# Patient Record
Sex: MALE | Race: WHITE | NOT HISPANIC OR LATINO | Employment: FULL TIME | URBAN - NONMETROPOLITAN AREA
[De-identification: names, ages, dates, MRNs, and addresses within clinical notes are randomized per-mention and may not be internally consistent; named-entity substitution may affect disease eponyms.]

---

## 2019-02-18 ENCOUNTER — OFFICE VISIT (OUTPATIENT)
Dept: FAMILY MEDICINE CLINIC | Facility: CLINIC | Age: 39
End: 2019-02-18
Payer: COMMERCIAL

## 2019-02-18 VITALS
HEIGHT: 66 IN | WEIGHT: 258.4 LBS | DIASTOLIC BLOOD PRESSURE: 94 MMHG | SYSTOLIC BLOOD PRESSURE: 168 MMHG | BODY MASS INDEX: 41.53 KG/M2

## 2019-02-18 DIAGNOSIS — Z13.220 SCREENING FOR HYPERLIPIDEMIA: ICD-10-CM

## 2019-02-18 DIAGNOSIS — E66.01 MORBID OBESITY (HCC): ICD-10-CM

## 2019-02-18 DIAGNOSIS — I10 ESSENTIAL HYPERTENSION: Primary | ICD-10-CM

## 2019-02-18 PROBLEM — F43.10 PTSD (POST-TRAUMATIC STRESS DISORDER): Status: ACTIVE | Noted: 2019-02-18

## 2019-02-18 PROCEDURE — 3008F BODY MASS INDEX DOCD: CPT | Performed by: PHYSICIAN ASSISTANT

## 2019-02-18 PROCEDURE — 1036F TOBACCO NON-USER: CPT | Performed by: PHYSICIAN ASSISTANT

## 2019-02-18 PROCEDURE — 99203 OFFICE O/P NEW LOW 30 MIN: CPT | Performed by: PHYSICIAN ASSISTANT

## 2019-02-18 RX ORDER — PAROXETINE HYDROCHLORIDE 37.5 MG/1
37.5 TABLET, FILM COATED, EXTENDED RELEASE ORAL EVERY EVENING
COMMUNITY
End: 2020-01-02 | Stop reason: CLARIF

## 2019-02-18 RX ORDER — DORZOLAMIDE HCL 20 MG/ML
1 SOLUTION/ DROPS OPHTHALMIC 3 TIMES DAILY
COMMUNITY

## 2019-02-18 RX ORDER — LISINOPRIL 20 MG/1
20 TABLET ORAL DAILY
Qty: 30 TABLET | Refills: 0 | Status: SHIPPED | OUTPATIENT
Start: 2019-02-18 | End: 2019-03-18 | Stop reason: SDUPTHER

## 2019-02-18 RX ORDER — PAROXETINE HYDROCHLORIDE 25 MG/1
25 TABLET, FILM COATED, EXTENDED RELEASE ORAL EVERY MORNING
COMMUNITY
End: 2020-01-02 | Stop reason: CLARIF

## 2019-02-18 RX ORDER — BRIMONIDINE TARTRATE 0.15 %
1 DROPS OPHTHALMIC (EYE) 3 TIMES DAILY
COMMUNITY

## 2019-02-18 RX ORDER — PREDNISOLONE ACETATE 10 MG/ML
1 SUSPENSION/ DROPS OPHTHALMIC 4 TIMES DAILY
COMMUNITY
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
## 2019-03-18 DIAGNOSIS — I10 ESSENTIAL HYPERTENSION: ICD-10-CM

## 2019-03-18 RX ORDER — LISINOPRIL 20 MG/1
TABLET ORAL
Qty: 30 TABLET | Refills: 2 | Status: SHIPPED | OUTPATIENT
Start: 2019-03-18 | End: 2019-06-26 | Stop reason: SDUPTHER

## 2019-03-19 DIAGNOSIS — M10.9 GOUT, UNSPECIFIED CAUSE, UNSPECIFIED CHRONICITY, UNSPECIFIED SITE: Primary | ICD-10-CM

## 2019-03-19 RX ORDER — COLCHICINE 0.6 MG/1
0.6 TABLET ORAL DAILY
Qty: 30 TABLET | Refills: 0 | Status: SHIPPED | OUTPATIENT
Start: 2019-03-19 | End: 2019-10-22 | Stop reason: SDUPTHER

## 2019-03-19 NOTE — TELEPHONE ENCOUNTER
His old family dr  Would prescribe it for him when he gets a gout flare up every now and then  He said it was colchicine   6 mg   Please send to [facility]

## 2019-03-19 NOTE — TELEPHONE ENCOUNTER
Pt called  requesting a prescription for Colchicine   6 mg   Nothing is recorded in patient's chart stating he has ever taken this or that we have ever prescribed  Do you want him to come in for an appt?

## 2019-04-29 ENCOUNTER — APPOINTMENT (OUTPATIENT)
Dept: RADIOLOGY | Facility: CLINIC | Age: 39
End: 2019-04-29
Payer: COMMERCIAL

## 2019-04-29 ENCOUNTER — OFFICE VISIT (OUTPATIENT)
Dept: URGENT CARE | Facility: CLINIC | Age: 39
End: 2019-04-29
Payer: COMMERCIAL

## 2019-04-29 VITALS
OXYGEN SATURATION: 97 % | WEIGHT: 242 LBS | BODY MASS INDEX: 37.98 KG/M2 | TEMPERATURE: 98.5 F | SYSTOLIC BLOOD PRESSURE: 150 MMHG | HEIGHT: 67 IN | DIASTOLIC BLOOD PRESSURE: 90 MMHG | HEART RATE: 82 BPM | RESPIRATION RATE: 18 BRPM

## 2019-04-29 DIAGNOSIS — M25.561 ACUTE PAIN OF RIGHT KNEE: Primary | ICD-10-CM

## 2019-04-29 DIAGNOSIS — M25.561 ACUTE PAIN OF RIGHT KNEE: ICD-10-CM

## 2019-04-29 PROCEDURE — 73564 X-RAY EXAM KNEE 4 OR MORE: CPT

## 2019-04-29 PROCEDURE — 99213 OFFICE O/P EST LOW 20 MIN: CPT | Performed by: PHYSICIAN ASSISTANT

## 2019-04-29 RX ORDER — NAPROXEN 500 MG/1
500 TABLET ORAL 2 TIMES DAILY WITH MEALS
Qty: 14 TABLET | Refills: 0 | Status: SHIPPED | OUTPATIENT
Start: 2019-04-29 | End: 2020-07-07

## 2019-05-01 VITALS
HEART RATE: 86 BPM | BODY MASS INDEX: 39.24 KG/M2 | HEIGHT: 67 IN | DIASTOLIC BLOOD PRESSURE: 100 MMHG | SYSTOLIC BLOOD PRESSURE: 158 MMHG | WEIGHT: 250 LBS

## 2019-05-01 DIAGNOSIS — M25.561 ACUTE PAIN OF RIGHT KNEE: ICD-10-CM

## 2019-05-01 DIAGNOSIS — Q74.1 BIPARTITE PATELLA: Primary | ICD-10-CM

## 2019-05-01 PROCEDURE — 99203 OFFICE O/P NEW LOW 30 MIN: CPT | Performed by: ORTHOPAEDIC SURGERY

## 2019-05-03 ENCOUNTER — APPOINTMENT (OUTPATIENT)
Dept: LAB | Facility: CLINIC | Age: 39
End: 2019-05-03
Payer: COMMERCIAL

## 2019-05-03 DIAGNOSIS — Z13.220 SCREENING FOR HYPERLIPIDEMIA: ICD-10-CM

## 2019-05-03 DIAGNOSIS — I10 ESSENTIAL HYPERTENSION: ICD-10-CM

## 2019-05-03 LAB
ALBUMIN SERPL BCP-MCNC: 4.4 G/DL (ref 3.5–5)
ALP SERPL-CCNC: 85 U/L (ref 46–116)
ALT SERPL W P-5'-P-CCNC: 43 U/L (ref 12–78)
ANION GAP SERPL CALCULATED.3IONS-SCNC: 6 MMOL/L (ref 4–13)
AST SERPL W P-5'-P-CCNC: 20 U/L (ref 5–45)
BASOPHILS # BLD AUTO: 0.07 THOUSANDS/ΜL (ref 0–0.1)
BASOPHILS NFR BLD AUTO: 1 % (ref 0–1)
BILIRUB SERPL-MCNC: 0.66 MG/DL (ref 0.2–1)
BUN SERPL-MCNC: 14 MG/DL (ref 5–25)
CALCIUM SERPL-MCNC: 9.2 MG/DL (ref 8.3–10.1)
CHLORIDE SERPL-SCNC: 105 MMOL/L (ref 100–108)
CHOLEST SERPL-MCNC: 192 MG/DL (ref 50–200)
CO2 SERPL-SCNC: 29 MMOL/L (ref 21–32)
CREAT SERPL-MCNC: 1.11 MG/DL (ref 0.6–1.3)
EOSINOPHIL # BLD AUTO: 0.13 THOUSAND/ΜL (ref 0–0.61)
EOSINOPHIL NFR BLD AUTO: 1 % (ref 0–6)
ERYTHROCYTE [DISTWIDTH] IN BLOOD BY AUTOMATED COUNT: 12.7 % (ref 11.6–15.1)
GFR SERPL CREATININE-BSD FRML MDRD: 84 ML/MIN/1.73SQ M
GLUCOSE P FAST SERPL-MCNC: 87 MG/DL (ref 65–99)
HCT VFR BLD AUTO: 44.9 % (ref 36.5–49.3)
HDLC SERPL-MCNC: 33 MG/DL (ref 40–60)
HGB BLD-MCNC: 15.3 G/DL (ref 12–17)
IMM GRANULOCYTES # BLD AUTO: 0.05 THOUSAND/UL (ref 0–0.2)
IMM GRANULOCYTES NFR BLD AUTO: 1 % (ref 0–2)
LDLC SERPL CALC-MCNC: 93 MG/DL (ref 0–100)
LYMPHOCYTES # BLD AUTO: 2.67 THOUSANDS/ΜL (ref 0.6–4.47)
LYMPHOCYTES NFR BLD AUTO: 26 % (ref 14–44)
MCH RBC QN AUTO: 29.7 PG (ref 26.8–34.3)
MCHC RBC AUTO-ENTMCNC: 34.1 G/DL (ref 31.4–37.4)
MCV RBC AUTO: 87 FL (ref 82–98)
MONOCYTES # BLD AUTO: 0.73 THOUSAND/ΜL (ref 0.17–1.22)
MONOCYTES NFR BLD AUTO: 7 % (ref 4–12)
NEUTROPHILS # BLD AUTO: 6.58 THOUSANDS/ΜL (ref 1.85–7.62)
NEUTS SEG NFR BLD AUTO: 64 % (ref 43–75)
NONHDLC SERPL-MCNC: 159 MG/DL
NRBC BLD AUTO-RTO: 0 /100 WBCS
PLATELET # BLD AUTO: 207 THOUSANDS/UL (ref 149–390)
PMV BLD AUTO: 11 FL (ref 8.9–12.7)
POTASSIUM SERPL-SCNC: 4.2 MMOL/L (ref 3.5–5.3)
PROT SERPL-MCNC: 7.6 G/DL (ref 6.4–8.2)
RBC # BLD AUTO: 5.15 MILLION/UL (ref 3.88–5.62)
SODIUM SERPL-SCNC: 140 MMOL/L (ref 136–145)
TRIGL SERPL-MCNC: 331 MG/DL
TSH SERPL DL<=0.05 MIU/L-ACNC: 3.33 UIU/ML (ref 0.36–3.74)
WBC # BLD AUTO: 10.23 THOUSAND/UL (ref 4.31–10.16)

## 2019-05-03 PROCEDURE — 85025 COMPLETE CBC W/AUTO DIFF WBC: CPT

## 2019-05-03 PROCEDURE — 36415 COLL VENOUS BLD VENIPUNCTURE: CPT

## 2019-05-03 PROCEDURE — 84443 ASSAY THYROID STIM HORMONE: CPT

## 2019-05-03 PROCEDURE — 80053 COMPREHEN METABOLIC PANEL: CPT

## 2019-05-03 PROCEDURE — 80061 LIPID PANEL: CPT

## 2019-05-06 ENCOUNTER — TELEPHONE (OUTPATIENT)
Dept: FAMILY MEDICINE CLINIC | Facility: CLINIC | Age: 39
End: 2019-05-06

## 2019-05-06 DIAGNOSIS — I10 ESSENTIAL HYPERTENSION: Primary | ICD-10-CM

## 2019-05-06 RX ORDER — HYDROCHLOROTHIAZIDE 25 MG/1
25 TABLET ORAL DAILY
Qty: 30 TABLET | Refills: 0 | Status: SHIPPED | OUTPATIENT
Start: 2019-05-06 | End: 2019-06-03 | Stop reason: SDUPTHER

## 2019-05-09 ENCOUNTER — EVALUATION (OUTPATIENT)
Dept: PHYSICAL THERAPY | Facility: CLINIC | Age: 39
End: 2019-05-09
Payer: COMMERCIAL

## 2019-05-09 DIAGNOSIS — Q74.1 BIPARTITE PATELLA: ICD-10-CM

## 2019-05-09 DIAGNOSIS — M25.561 ACUTE PAIN OF RIGHT KNEE: Primary | ICD-10-CM

## 2019-05-09 PROCEDURE — 97110 THERAPEUTIC EXERCISES: CPT | Performed by: PHYSICAL THERAPIST

## 2019-05-09 PROCEDURE — 97535 SELF CARE MNGMENT TRAINING: CPT | Performed by: PHYSICAL THERAPIST

## 2019-05-09 PROCEDURE — 97162 PT EVAL MOD COMPLEX 30 MIN: CPT | Performed by: PHYSICAL THERAPIST

## 2019-05-13 ENCOUNTER — APPOINTMENT (OUTPATIENT)
Dept: PHYSICAL THERAPY | Facility: CLINIC | Age: 39
End: 2019-05-13
Payer: COMMERCIAL

## 2019-05-14 VITALS
HEIGHT: 67 IN | BODY MASS INDEX: 38.61 KG/M2 | DIASTOLIC BLOOD PRESSURE: 89 MMHG | HEART RATE: 84 BPM | WEIGHT: 246 LBS | SYSTOLIC BLOOD PRESSURE: 156 MMHG

## 2019-05-14 DIAGNOSIS — Q74.1 BIPARTITE PATELLA: Primary | ICD-10-CM

## 2019-05-14 PROCEDURE — 20610 DRAIN/INJ JOINT/BURSA W/O US: CPT | Performed by: ORTHOPAEDIC SURGERY

## 2019-05-14 PROCEDURE — 99213 OFFICE O/P EST LOW 20 MIN: CPT | Performed by: ORTHOPAEDIC SURGERY

## 2019-05-14 RX ORDER — IBUPROFEN 800 MG/1
800 TABLET ORAL EVERY 8 HOURS PRN
Qty: 15 TABLET | Refills: 0 | Status: SHIPPED | OUTPATIENT
Start: 2019-05-14 | End: 2020-07-07

## 2019-05-14 RX ORDER — LIDOCAINE HYDROCHLORIDE 10 MG/ML
3 INJECTION, SOLUTION INFILTRATION; PERINEURAL
Status: COMPLETED | OUTPATIENT
Start: 2019-05-14 | End: 2019-05-14

## 2019-05-14 RX ORDER — TRIAMCINOLONE ACETONIDE 40 MG/ML
80 INJECTION, SUSPENSION INTRA-ARTICULAR; INTRAMUSCULAR
Status: COMPLETED | OUTPATIENT
Start: 2019-05-14 | End: 2019-05-14

## 2019-05-14 RX ADMIN — TRIAMCINOLONE ACETONIDE 80 MG: 40 INJECTION, SUSPENSION INTRA-ARTICULAR; INTRAMUSCULAR at 13:14

## 2019-05-14 RX ADMIN — LIDOCAINE HYDROCHLORIDE 3 ML: 10 INJECTION, SOLUTION INFILTRATION; PERINEURAL at 13:14

## 2019-05-16 ENCOUNTER — APPOINTMENT (OUTPATIENT)
Dept: PHYSICAL THERAPY | Facility: CLINIC | Age: 39
End: 2019-05-16
Payer: COMMERCIAL

## 2019-05-21 ENCOUNTER — HOSPITAL ENCOUNTER (OUTPATIENT)
Dept: CT IMAGING | Facility: HOSPITAL | Age: 39
Discharge: HOME | End: 2019-05-21
Admitting: RADIOLOGY
Payer: COMMERCIAL

## 2019-05-21 ENCOUNTER — HOSPITAL ENCOUNTER (OUTPATIENT)
Dept: RADIOLOGY | Facility: HOSPITAL | Age: 39
Discharge: HOME | End: 2019-05-21
Payer: COMMERCIAL

## 2019-05-21 ENCOUNTER — TELEPHONE (OUTPATIENT)
Dept: OBGYN CLINIC | Facility: CLINIC | Age: 39
End: 2019-05-21

## 2019-05-21 DIAGNOSIS — M25.561 ACUTE PAIN OF RIGHT KNEE: ICD-10-CM

## 2019-05-21 PROCEDURE — 73701 CT LOWER EXTREMITY W/DYE: CPT

## 2019-05-21 PROCEDURE — 77002 NEEDLE LOCALIZATION BY XRAY: CPT

## 2019-05-21 PROCEDURE — 27369 NJX CNTRST KNE ARTHG/CT/MRI: CPT

## 2019-05-21 RX ORDER — BUPIVACAINE HYDROCHLORIDE 2.5 MG/ML
5 INJECTION, SOLUTION EPIDURAL; INFILTRATION; INTRACAUDAL
Status: COMPLETED | OUTPATIENT
Start: 2019-05-21 | End: 2019-05-21

## 2019-05-21 RX ORDER — LIDOCAINE WITH 8.4% SOD BICARB 0.9%(10ML)
3 SYRINGE (ML) INJECTION ONCE
Status: COMPLETED | OUTPATIENT
Start: 2019-05-21 | End: 2019-05-21

## 2019-05-21 RX ORDER — 0.9 % SODIUM CHLORIDE 0.9 %
30 VIAL (ML) INJECTION
Status: COMPLETED | OUTPATIENT
Start: 2019-05-21 | End: 2019-05-21

## 2019-05-21 RX ADMIN — BUPIVACAINE HYDROCHLORIDE 5 ML: 2.5 INJECTION, SOLUTION EPIDURAL; INFILTRATION; INTRACAUDAL; PERINEURAL at 14:07

## 2019-05-21 RX ADMIN — IOHEXOL 30 ML: 240 INJECTION, SOLUTION INTRATHECAL; INTRAVASCULAR; INTRAVENOUS; ORAL at 14:07

## 2019-05-21 RX ADMIN — SODIUM CHLORIDE 30 ML: 9 INJECTION, SOLUTION INTRAMUSCULAR; INTRAVENOUS; SUBCUTANEOUS at 14:07

## 2019-05-21 RX ADMIN — LIDOCAINE HYDROCHLORIDE 3 ML: 10 INJECTION, SOLUTION INFILTRATION; PERINEURAL at 14:07

## 2019-05-24 VITALS
SYSTOLIC BLOOD PRESSURE: 156 MMHG | BODY MASS INDEX: 37.83 KG/M2 | WEIGHT: 241 LBS | DIASTOLIC BLOOD PRESSURE: 92 MMHG | HEIGHT: 67 IN | HEART RATE: 90 BPM

## 2019-05-24 DIAGNOSIS — M22.2X1 PATELLOFEMORAL DISORDER OF RIGHT KNEE: Primary | ICD-10-CM

## 2019-05-24 PROCEDURE — 99213 OFFICE O/P EST LOW 20 MIN: CPT | Performed by: ORTHOPAEDIC SURGERY

## 2019-06-03 DIAGNOSIS — I10 ESSENTIAL HYPERTENSION: ICD-10-CM

## 2019-06-03 RX ORDER — HYDROCHLOROTHIAZIDE 25 MG/1
TABLET ORAL
Qty: 30 TABLET | Refills: 0 | Status: SHIPPED | OUTPATIENT
Start: 2019-06-03 | End: 2019-08-08 | Stop reason: ALTCHOICE

## 2019-06-26 DIAGNOSIS — I10 ESSENTIAL HYPERTENSION: ICD-10-CM

## 2019-06-26 RX ORDER — LISINOPRIL 20 MG/1
20 TABLET ORAL DAILY
Qty: 30 TABLET | Refills: 2 | Status: SHIPPED | OUTPATIENT
Start: 2019-06-26 | End: 2019-09-18 | Stop reason: SDUPTHER

## 2019-07-15 ENCOUNTER — TELEPHONE (OUTPATIENT)
Dept: FAMILY MEDICINE CLINIC | Facility: CLINIC | Age: 39
End: 2019-07-15

## 2019-07-15 NOTE — TELEPHONE ENCOUNTER
PT CAME IN FOR BP CHECK /88  HE SAID HE HAS SOME DIZZINESS SINCE THE WATER PILL WAS ADDED  HE IS ALSO CUTTING BACK ON DRINKS WITH CAFFEINE, I ASKED IF THE DIZZINESS IN ON THE DAYS HE IS NOT DRINKING CAFFEINE, HE SAID IT IS  THE DAYS HE HAS CAFFEINE HE IS NOT DIZZY  I TOLD HIM TO CUT BACK BUT NOT GIVE UP COLD TURKEY  WEAN OFF AND SEE HOW HE FEELS  WHAT DO YOU THINK?

## 2019-07-15 NOTE — TELEPHONE ENCOUNTER
It may be related to cutting out the caffeine intake  His pressure may be lower on the days he is not drinking caffeine  I agree, he should try to wean off of it rather than quitting cold turkey  I would like him to keep a log of his blood pressures

## 2019-07-15 NOTE — TELEPHONE ENCOUNTER
----- Message from Destinee Meneses sent at 7/15/2019 11:23 AM EDT -----  Regarding: FW: Your Recent Visit  Contact: [phone]      ----- Message -----  From: Geovanny Ordonez  Sent: 7/15/2019   9:56 AM EDT  To: [facility]  Subject: RE: Your Recent Visit                            Good morning Tracey Johnny  My blood pressure medicines, is it normal to feel light headed/dizzy a little bit? I been taking the (2) blood pressure meds and my 37 5mg Paxil Cr together in the morning  Is this ok and to be expected? Just asking Incase it's not and maybe I should be seen  Not sure if taking all 3 together is the issue  Thank you   ----- Message -----  From: Desirae Muñoz PA-C  Sent: 2/18/19, 4:19 PM  To: Geovanny Ordonez  Subject: Your Recent Visit    Geovanny Ordonez, you have a new After Visit Summary in [address] [place] [address]  Please click on visits and then your most recent appointment, to view your After Visit Summary  If you are experiencing any technical issues please call our patient service desk at [phone] ([phone]) option [phone]

## 2019-07-15 NOTE — TELEPHONE ENCOUNTER
I would recommend that he keep a log of his blood pressures and come in for an appointment to discuss

## 2019-08-08 ENCOUNTER — TELEPHONE (OUTPATIENT)
Dept: FAMILY MEDICINE CLINIC | Facility: CLINIC | Age: 39
End: 2019-08-08

## 2019-08-08 DIAGNOSIS — I10 ESSENTIAL HYPERTENSION: Primary | ICD-10-CM

## 2019-08-08 RX ORDER — AMLODIPINE BESYLATE 5 MG/1
5 TABLET ORAL DAILY
Qty: 30 TABLET | Refills: 0 | Status: SHIPPED | OUTPATIENT
Start: 2019-08-08 | End: 2019-09-05 | Stop reason: SDUPTHER

## 2019-08-08 NOTE — TELEPHONE ENCOUNTER
Discontinue HCTZ and I will send a script for amlodipine  Come in for a BP check in about 1 week  Continue to check home blood pressures  Let us know if symptoms do not improve  Also, Paxil can sometimes cause ED   He may want to also talk to his psychiatrist

## 2019-08-08 NOTE — TELEPHONE ENCOUNTER
Pt called stating since you added the HCTZ medication his bp is good and he is losing weight but does c/o erectile dysfunction  Asking if you can switch something to help resolve this problem?

## 2019-08-30 DIAGNOSIS — I10 ESSENTIAL HYPERTENSION: ICD-10-CM

## 2019-08-30 RX ORDER — HYDROCHLOROTHIAZIDE 25 MG/1
TABLET ORAL
Qty: 30 TABLET | Refills: 0 | OUTPATIENT
Start: 2019-08-30

## 2019-09-03 DIAGNOSIS — I10 ESSENTIAL HYPERTENSION: ICD-10-CM

## 2019-09-03 RX ORDER — HYDROCHLOROTHIAZIDE 25 MG/1
TABLET ORAL
Qty: 30 TABLET | Refills: 0 | OUTPATIENT
Start: 2019-09-03

## 2019-09-05 DIAGNOSIS — I10 ESSENTIAL HYPERTENSION: ICD-10-CM

## 2019-09-05 RX ORDER — AMLODIPINE BESYLATE 5 MG/1
TABLET ORAL
Qty: 30 TABLET | Refills: 0 | Status: SHIPPED | OUTPATIENT
Start: 2019-09-05 | End: 2019-09-29 | Stop reason: SDUPTHER

## 2019-09-18 DIAGNOSIS — I10 ESSENTIAL HYPERTENSION: ICD-10-CM

## 2019-09-18 RX ORDER — LISINOPRIL 20 MG/1
TABLET ORAL
Qty: 90 TABLET | Refills: 0 | Status: SHIPPED | OUTPATIENT
Start: 2019-09-18 | End: 2019-12-12 | Stop reason: SDUPTHER

## 2019-09-18 NOTE — TELEPHONE ENCOUNTER
Please let Flora Krishna know that I refilled his bp medication, but he should come in for an appointment before his next refill to have a recheck

## 2019-09-29 DIAGNOSIS — I10 ESSENTIAL HYPERTENSION: ICD-10-CM

## 2019-09-30 DIAGNOSIS — I10 ESSENTIAL HYPERTENSION: ICD-10-CM

## 2019-09-30 RX ORDER — AMLODIPINE BESYLATE 5 MG/1
5 TABLET ORAL DAILY
Qty: 90 TABLET | Refills: 0 | Status: SHIPPED | OUTPATIENT
Start: 2019-09-30 | End: 2019-09-30 | Stop reason: SDUPTHER

## 2019-09-30 RX ORDER — AMLODIPINE BESYLATE 5 MG/1
5 TABLET ORAL DAILY
Qty: 90 TABLET | Refills: 0 | Status: SHIPPED | OUTPATIENT
Start: 2019-09-30 | End: 2020-01-02 | Stop reason: SDUPTHER

## 2019-09-30 RX ORDER — AMLODIPINE BESYLATE 5 MG/1
TABLET ORAL
Qty: 90 TABLET | Refills: 0 | Status: SHIPPED | OUTPATIENT
Start: 2019-09-30 | End: 2019-09-30 | Stop reason: SDUPTHER

## 2019-10-09 DIAGNOSIS — I10 ESSENTIAL HYPERTENSION: ICD-10-CM

## 2019-10-09 RX ORDER — HYDROCHLOROTHIAZIDE 25 MG/1
TABLET ORAL
Qty: 30 TABLET | Refills: 0 | OUTPATIENT
Start: 2019-10-09

## 2019-10-16 ENCOUNTER — OFFICE VISIT (OUTPATIENT)
Dept: FAMILY MEDICINE CLINIC | Facility: CLINIC | Age: 39
End: 2019-10-16
Payer: COMMERCIAL

## 2019-10-16 VITALS
WEIGHT: 251.8 LBS | HEIGHT: 67 IN | DIASTOLIC BLOOD PRESSURE: 88 MMHG | SYSTOLIC BLOOD PRESSURE: 140 MMHG | BODY MASS INDEX: 39.52 KG/M2

## 2019-10-16 DIAGNOSIS — I10 ESSENTIAL HYPERTENSION: Primary | ICD-10-CM

## 2019-10-16 DIAGNOSIS — F41.9 ANXIETY: ICD-10-CM

## 2019-10-16 PROBLEM — F43.10 PTSD (POST-TRAUMATIC STRESS DISORDER): Status: ACTIVE | Noted: 2018-10-08

## 2019-10-16 PROBLEM — F33.41 RECURRENT MAJOR DEPRESSIVE DISORDER, IN PARTIAL REMISSION (HCC): Status: ACTIVE | Noted: 2017-10-11

## 2019-10-16 PROCEDURE — 3008F BODY MASS INDEX DOCD: CPT | Performed by: PHYSICIAN ASSISTANT

## 2019-10-16 PROCEDURE — 99213 OFFICE O/P EST LOW 20 MIN: CPT | Performed by: PHYSICIAN ASSISTANT

## 2019-10-16 NOTE — LETTER
October 16, 2019     Patient: Lex Lackey   YOB: 1980   Date of Visit: 10/16/2019       To Whom it May Concern:    Lex Lackey is under my professional care  He was seen in my office on 10/16/2019  He is excused from work from 10/14/19 to 10/22/19  He may return to work on 10/23/19  If you have any questions or concerns, please don't hesitate to call           Sincerely,          James Rosen PA-C        CC: No Recipients

## 2019-10-16 NOTE — LETTER
October 16, 2019     Patient: Natalio Rivas   YOB: 1980   Date of Visit: 10/16/2019       To Whom it May Concern:    Natalio Rivas is under my professional care  He was seen in my office on 10/16/2019  He may return to work on 10/23/19  If you have any questions or concerns, please don't hesitate to call           Sincerely,            Gema Godinez PA-C

## 2019-10-16 NOTE — PROGRESS NOTES
Assessment/Plan:    Problem List Items Addressed This Visit        Cardiovascular and Mediastinum    Essential hypertension - Primary       Other    Anxiety           Diagnoses and all orders for this visit:    Essential hypertension    Anxiety        Continue same medications for hypertension  Recommended that he consider getting established with a new counselor and following up with his psychiatrist    Agreed to give him off until next Wednesday to allow him to regroup and take some time for himself  Subjective:      Patient ID: Yesica Wills is a 46 y o  male  Troy Snyder is a very pleasant 46year old male who is here for a follow up for his blood pressure and has been dealing with an increased amount of anxiety  He was having episodes of lightheadedness when he was on the HCTZ, so we switched him to amlodipine  He has not had anymore issues since his medication has rita changed  He does not check his blood pressures on a regular basis  He admits that his anxiety has been very high  He is still following with his psychiatrist, but he feels like he does not want to listen to him  He is on Paxil, which does provide some relief  He was seeing a counselor, but did not have a good connection with them  He is interested in finding a new counselor  His son is a senior in high school, and has been making poor decisions  He is constantly worrying about him and disciplining him  His job is also adding a lot of stress  He works either 6-7 days/week  He has not had a holiday off in a long time  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 and feels like they do not care about their work, which filters down to him  He did apply for a new job, and is hopeful about getting it  He comes home and just wants to sleep because the anxiety eats away at him  He spoke with his supervisor about taking some time off so that he can get his anxiety under control, and they were agreeable as long as he had documentation to support it  The following portions of the patient's history were reviewed and updated as appropriate:   He has a past medical history of Blindness of left eye and Knee pain, right ,  does not have any pertinent problems on file  ,   has a past surgical history that includes Eye surgery; Neck surgery; Wrist surgery; Hand surgery; and FL injection right knee (arthrogram) (5/21/2019)  ,  family history includes Anxiety disorder in his mother; Depression in his mother; Diabetes in his mother; Hypertension in his mother  ,   reports that he has never smoked  He has never used smokeless tobacco  He reports that he does not drink alcohol or use drugs  ,  is allergic to amoxil [amoxicillin]; ciprofloxacin; and penicillins     Current Outpatient Medications   Medication Sig Dispense Refill    amLODIPine (NORVASC) 5 mg tablet Take 1 tablet (5 mg total) by mouth daily 90 tablet 0    brimonidine (ALPHAGAN P) 0 15 % ophthalmic solution 1 drop 3 (three) times a day      colchicine (COLCRYS) 0 6 mg tablet Take 1 tablet (0 6 mg total) by mouth daily 30 tablet 0    dorzolamide (TRUSOPT) 2 % ophthalmic solution 1 drop 3 (three) times a day      ibuprofen (MOTRIN) 800 mg tablet Take 1 tablet (800 mg total) by mouth every 8 (eight) hours as needed for mild pain for up to 5 days 15 tablet 0    lisinopril (ZESTRIL) 20 mg tablet TAKE 1 TABLET BY MOUTH EVERY DAY 90 tablet 0    naproxen (NAPROSYN) 500 mg tablet Take 1 tablet (500 mg total) by mouth 2 (two) times a day with meals for 7 days 14 tablet 0    PARoxetine (PAXIL-CR) 25 mg 24 hr tablet Take 25 mg by mouth every morning      PARoxetine (PAXIL-CR) 37 5 mg 24 hr tablet Take 37 5 mg by mouth every evening      prednisoLONE acetate (PRED FORTE) 1 % ophthalmic suspension 1 drop 4 (four) times a day       No current facility-administered medications for this visit  Review of Systems   Constitutional: Negative for chills, diaphoresis, fatigue and fever     HENT: Negative for congestion, ear pain, postnasal drip, rhinorrhea, sneezing, sore throat and trouble swallowing  Eyes: Positive for visual disturbance (blind in left eye)  Negative for pain  Respiratory: Negative for apnea, cough, shortness of breath and wheezing  Cardiovascular: Negative for chest pain and palpitations  Gastrointestinal: Negative for abdominal pain, constipation, diarrhea, nausea and vomiting  Genitourinary: Negative for dysuria and hematuria  Musculoskeletal: Negative for arthralgias, gait problem and myalgias  Neurological: Negative for dizziness, syncope, weakness, light-headedness, numbness and headaches  Psychiatric/Behavioral: Positive for sleep disturbance  Negative for suicidal ideas  The patient is nervous/anxious  Objective:  Vitals:    10/16/19 1454   BP: 140/88   BP Location: Left arm   Patient Position: Sitting   Weight: 114 kg (251 lb 12 8 oz)   Height: 5' 7" (1 702 m)     Body mass index is 39 44 kg/m²  Physical Exam   Constitutional: He is oriented to person, place, and time  He appears well-developed and well-nourished  HENT:   Head: Normocephalic and atraumatic  Right Ear: External ear normal    Left Ear: External ear normal    Nose: Nose normal    Eyes: Pupils are equal, round, and reactive to light  EOM are normal    Neck: Normal range of motion  Neck supple  Cardiovascular: Normal rate, regular rhythm and normal heart sounds  Exam reveals no gallop and no friction rub  No murmur heard  Pulmonary/Chest: Effort normal and breath sounds normal  No respiratory distress  He has no wheezes  He has no rales  Musculoskeletal: Normal range of motion  Lymphadenopathy:     He has no cervical adenopathy  Neurological: He is alert and oriented to person, place, and time  Skin: Skin is warm and dry  Psychiatric: His behavior is normal  Judgment and thought content normal  His mood appears anxious

## 2019-10-21 ENCOUNTER — TELEPHONE (OUTPATIENT)
Dept: FAMILY MEDICINE CLINIC | Facility: CLINIC | Age: 39
End: 2019-10-21

## 2019-10-21 NOTE — LETTER
October 22, 2019     Patient: Gabriela Quinn   YOB: 1980   Date of Visit: 10/21/2019       To Whom It May Concern: It is my medical opinion that Gabriela Qiunn may return to work on 10/28/2019  Please excuse him from work 10/16/19-10/28/19  If you have any questions or concerns, please don't hesitate to call           Sincerely,        Thomas Self PA-C    CC: No Recipients

## 2019-10-21 NOTE — TELEPHONE ENCOUNTER
Pt seen last week w/ personal issues  He is supposed to return to work this Wed  Asking if you can extend it until Monday?

## 2019-10-21 NOTE — TELEPHONE ENCOUNTER
I already submitted his paperwork to return  I have no problem extending it, however, he may have to follow through on his end to have the paperwork faxed back to us

## 2019-10-21 NOTE — LETTER
October 22, 2019     Patient: Chinita Romberg   YOB: 1980   Date of Visit: 10/21/2019       To Whom It May Concern: It is my medical opinion that Chinita Romberg may return to work on 10/28/19  Please excuse patient from work 10/16/19 to 10/27/19  If you have any questions or concerns, please don't hesitate to call           Sincerely,        Corbin Brooks PA-C    CC: No Recipients

## 2019-10-21 NOTE — TELEPHONE ENCOUNTER
Patient called to advise he reached out to [facility] about new paperwork for time off extension  Patient was advised that he wouldn't need new paperwork, that you should just cross out the original return to work date, initial it and write in the new date and re-fax back  Patient is also requesting to  an updated work note next week

## 2019-10-22 ENCOUNTER — TELEPHONE (OUTPATIENT)
Dept: FAMILY MEDICINE CLINIC | Facility: CLINIC | Age: 39
End: 2019-10-22

## 2019-10-22 DIAGNOSIS — M10.9 GOUT, UNSPECIFIED CAUSE, UNSPECIFIED CHRONICITY, UNSPECIFIED SITE: ICD-10-CM

## 2019-10-22 RX ORDER — COLCHICINE 0.6 MG/1
0.6 TABLET ORAL DAILY
Qty: 30 TABLET | Refills: 1 | Status: SHIPPED | OUTPATIENT
Start: 2019-10-22 | End: 2019-12-24 | Stop reason: SDUPTHER

## 2019-10-22 NOTE — TELEPHONE ENCOUNTER
YOU DID A NOTE FOR PT, THE DATE WAS WRONG  THE INITIAL START DATE WAS 10-14-19  HE SAID YOU KNOW ABOUT THIS AND THANK YOU

## 2019-12-12 DIAGNOSIS — I10 ESSENTIAL HYPERTENSION: ICD-10-CM

## 2019-12-12 RX ORDER — LISINOPRIL 20 MG/1
TABLET ORAL
Qty: 90 TABLET | Refills: 0 | Status: SHIPPED | OUTPATIENT
Start: 2019-12-12 | End: 2020-03-05

## 2019-12-24 DIAGNOSIS — M10.9 GOUT, UNSPECIFIED CAUSE, UNSPECIFIED CHRONICITY, UNSPECIFIED SITE: ICD-10-CM

## 2019-12-27 RX ORDER — COLCHICINE 0.6 MG/1
TABLET ORAL
Qty: 30 TABLET | Refills: 1 | Status: SHIPPED | OUTPATIENT
Start: 2019-12-27 | End: 2020-03-05

## 2020-01-02 ENCOUNTER — OFFICE VISIT (OUTPATIENT)
Dept: FAMILY MEDICINE CLINIC | Facility: CLINIC | Age: 40
End: 2020-01-02
Payer: COMMERCIAL

## 2020-01-02 VITALS
HEART RATE: 88 BPM | HEIGHT: 67 IN | SYSTOLIC BLOOD PRESSURE: 138 MMHG | DIASTOLIC BLOOD PRESSURE: 68 MMHG | WEIGHT: 253.6 LBS | BODY MASS INDEX: 39.8 KG/M2 | OXYGEN SATURATION: 98 %

## 2020-01-02 DIAGNOSIS — F43.10 PTSD (POST-TRAUMATIC STRESS DISORDER): ICD-10-CM

## 2020-01-02 DIAGNOSIS — M1A.9XX0 CHRONIC GOUT WITHOUT TOPHUS, UNSPECIFIED CAUSE, UNSPECIFIED SITE: ICD-10-CM

## 2020-01-02 DIAGNOSIS — I10 ESSENTIAL HYPERTENSION: ICD-10-CM

## 2020-01-02 DIAGNOSIS — F41.9 ANXIETY: Primary | ICD-10-CM

## 2020-01-02 DIAGNOSIS — Z13.220 SCREENING FOR HYPERLIPIDEMIA: ICD-10-CM

## 2020-01-02 PROCEDURE — 3075F SYST BP GE 130 - 139MM HG: CPT | Performed by: PHYSICIAN ASSISTANT

## 2020-01-02 PROCEDURE — 3078F DIAST BP <80 MM HG: CPT | Performed by: PHYSICIAN ASSISTANT

## 2020-01-02 PROCEDURE — 3008F BODY MASS INDEX DOCD: CPT | Performed by: PHYSICIAN ASSISTANT

## 2020-01-02 PROCEDURE — 1036F TOBACCO NON-USER: CPT | Performed by: PHYSICIAN ASSISTANT

## 2020-01-02 PROCEDURE — 99214 OFFICE O/P EST MOD 30 MIN: CPT | Performed by: PHYSICIAN ASSISTANT

## 2020-01-02 RX ORDER — AMLODIPINE BESYLATE 5 MG/1
5 TABLET ORAL DAILY
Qty: 90 TABLET | Refills: 0 | Status: SHIPPED | OUTPATIENT
Start: 2020-01-02 | End: 2020-06-29

## 2020-01-02 RX ORDER — PAROXETINE HYDROCHLORIDE 37.5 MG/1
37.5 TABLET, FILM COATED, EXTENDED RELEASE ORAL EVERY MORNING
Qty: 30 TABLET | Refills: 2 | Status: SHIPPED | OUTPATIENT
Start: 2020-01-02 | End: 2020-04-06

## 2020-01-02 RX ORDER — PAROXETINE HCL 25 MG
25 TABLET, EXTENDED RELEASE 24 HR ORAL EVERY MORNING
Qty: 30 TABLET | Refills: 2 | Status: SHIPPED | OUTPATIENT
Start: 2020-01-02 | End: 2020-05-18

## 2020-01-02 NOTE — PROGRESS NOTES
Assessment/Plan:    Problem List Items Addressed This Visit        Cardiovascular and Mediastinum    Essential hypertension    Relevant Medications    amLODIPine (NORVASC) 5 mg tablet    Other Relevant Orders    CBC and differential    Comprehensive metabolic panel       Other    PTSD (post-traumatic stress disorder)    Relevant Medications    PAXIL CR 37 5 MG 24 hr tablet    PAXIL CR 25 MG 24 hr tablet    Anxiety - Primary    Relevant Medications    PAXIL CR 37 5 MG 24 hr tablet    PAXIL CR 25 MG 24 hr tablet    Chronic gout without tophus    Relevant Orders    CBC and differential    Comprehensive metabolic panel    Uric acid      Other Visit Diagnoses     Screening for hyperlipidemia        Relevant Orders    Comprehensive metabolic panel    Lipid panel           Diagnoses and all orders for this visit:    Anxiety  -     PAXIL CR 37 5 MG 24 hr tablet; Take 1 tablet (37 5 mg total) by mouth every morning  -     PAXIL CR 25 MG 24 hr tablet; Take 1 tablet (25 mg total) by mouth every morning    PTSD (post-traumatic stress disorder)  -     PAXIL CR 37 5 MG 24 hr tablet; Take 1 tablet (37 5 mg total) by mouth every morning  -     PAXIL CR 25 MG 24 hr tablet; Take 1 tablet (25 mg total) by mouth every morning    Chronic gout without tophus, unspecified cause, unspecified site  -     CBC and differential; Future  -     Comprehensive metabolic panel; Future  -     Uric acid; Future    Essential hypertension  -     CBC and differential; Future  -     Comprehensive metabolic panel; Future  -     amLODIPine (NORVASC) 5 mg tablet; Take 1 tablet (5 mg total) by mouth daily    Screening for hyperlipidemia  -     Comprehensive metabolic panel; Future  -     Lipid panel; Future        Agreed to prescribe Paxil, but advised him that if symptoms worsen he needs to go back to a psychiatrist  Patient verbalized understanding  Will get blood work to assess uric acid level and routine labs   Educated on avoiding trigger foods and eating a low-purine diet  Continue same medications  Follow-up in 4 months or sooner if needed  Subjective:      Patient ID: Kaylyn Patel is a 45 y o  male  Michelledelia Acuna is a very pleasant 45year old male who is here for a routine follow-up  He has been stable on Paxil for many years  He is asking if we would be willing to take over as the prescriber  He is going to see a psychiatrist every 3 months, but it is expensive and he does not get anything out of his visits  He is also asking if there is anything he can be started on to help with gout  He is getting a gout flare approximately 1-2 times per week  He states that he gets gouty attacks in his his right foot, right knee, left wrist, or right wrist  He states that his joint will get warm, red, and swollen  It usually happens after eating certain foods  He was on allopurinol in the past, but never continued with the script  He takes colchicine as needed, which does help resolve the flares  He is not currently in a flare  The following portions of the patient's history were reviewed and updated as appropriate:   He has a past medical history of Blindness of left eye and Knee pain, right ,  does not have any pertinent problems on file  ,   has a past surgical history that includes Eye surgery; Neck surgery; Wrist surgery; Hand surgery; and [place] injection right knee (arthrogram) (5/21/2019)  ,  family history includes Anxiety disorder in his mother; Depression in his mother; Diabetes in his mother; Hypertension in his mother  ,   reports that he has never smoked  He has never used smokeless tobacco  He reports that he does not drink alcohol or use drugs  ,  is allergic to amoxil [amoxicillin]; ciprofloxacin; and penicillins     Current Outpatient Medications   Medication Sig Dispense Refill    amLODIPine (NORVASC) 5 mg tablet Take 1 tablet (5 mg total) by mouth daily 90 tablet 0    brimonidine (ALPHAGAN P) 0 15 % ophthalmic solution 1 drop 3 (three) times a day  colchicine (COLCRYS) 0 6 mg tablet TAKE 1 TABLET BY MOUTH EVERY DAY 30 tablet 1    dorzolamide (TRUSOPT) 2 % ophthalmic solution 1 drop 3 (three) times a day      lisinopril (ZESTRIL) 20 mg tablet TAKE 1 TABLET BY MOUTH EVERY DAY 90 tablet 0    prednisoLONE acetate (PRED FORTE) 1 % ophthalmic suspension 1 drop 4 (four) times a day      ibuprofen (MOTRIN) 800 mg tablet Take 1 tablet (800 mg total) by mouth every 8 (eight) hours as needed for mild pain for up to 5 days 15 tablet 0    naproxen (NAPROSYN) 500 mg tablet Take 1 tablet (500 mg total) by mouth 2 (two) times a day with meals for 7 days 14 tablet 0    PAXIL CR 25 MG 24 hr tablet Take 1 tablet (25 mg total) by mouth every morning 30 tablet 2    PAXIL CR 37 5 MG 24 hr tablet Take 1 tablet (37 5 mg total) by mouth every morning 30 tablet 2     No current facility-administered medications for this visit  Review of Systems   Constitutional: Negative for chills, diaphoresis, fatigue and fever  HENT: Negative for congestion, ear pain, postnasal drip, rhinorrhea, sneezing, sore throat and trouble swallowing  Eyes: Negative for pain and visual disturbance  Respiratory: Negative for apnea, cough, shortness of breath and wheezing  Cardiovascular: Negative for chest pain and palpitations  Gastrointestinal: Negative for abdominal pain, constipation, diarrhea, nausea and vomiting  Genitourinary: Negative for dysuria and hematuria  Musculoskeletal: Positive for arthralgias  Negative for gait problem and myalgias  Neurological: Negative for dizziness, syncope, weakness, light-headedness, numbness and headaches  Psychiatric/Behavioral: Negative for suicidal ideas  The patient is not nervous/anxious  Objective:  Vitals:    01/02/20 1422   BP: 138/68   Pulse: 88   SpO2: 98%   Weight: 115 kg (253 lb 9 6 oz)   Height: 5' 7" (1 702 m)     Body mass index is 39 72 kg/m²       Physical Exam   Constitutional: He is oriented to person, place, and time  He appears well-developed and well-nourished  HENT:   Head: Normocephalic and atraumatic  Right Ear: Tympanic membrane, external ear and ear canal normal    Left Ear: Tympanic membrane, external ear and ear canal normal    Nose: Nose normal    Mouth/Throat: Oropharynx is clear and moist and mucous membranes are normal  No oropharyngeal exudate, posterior oropharyngeal edema or posterior oropharyngeal erythema  Eyes: Pupils are equal, round, and reactive to light  EOM are normal    Neck: Normal range of motion  Neck supple  Cardiovascular: Normal rate, regular rhythm and normal heart sounds  Exam reveals no gallop and no friction rub  No murmur heard  Pulmonary/Chest: Effort normal and breath sounds normal  No respiratory distress  He has no wheezes  He has no rales  Musculoskeletal: Normal range of motion  Lymphadenopathy:     He has no cervical adenopathy  Neurological: He is alert and oriented to person, place, and time  Skin: Skin is warm and dry  Psychiatric: He has a normal mood and affect  His behavior is normal  Judgment and thought content normal    Vitals reviewed

## 2020-01-02 NOTE — PATIENT INSTRUCTIONS
Acute Gouty Arthritis   GENERAL INFORMATION:   What is acute gouty arthritis? Acute gouty arthritis, or gout, is a disease that causes severe joint pain and stiffness  Acute gout pain starts suddenly, gets worse quickly, and stops on its own  Acute gout can become chronic and cause permanent damage to the joints  What causes acute gouty arthritis? Gout develops when uric acid builds up in your joints  Uric acid is made when your body breaks down purines  Purines are found in some medicines and foods  Your body gets rid of most uric acid through your urine  When your body cannot get rid of enough uric acid, it can build up and form crystals in your joints  The crystals cause your joints to become swollen and painful  This is called a gout attack  What increases my risk for acute gouty arthritis? You may have been born with a decreased ability to break down and get rid of purines  Your body's ability to break down purines may be very slow  Because of this, uric acid can build up and increase your risk of gout  Any of the following can also increase your risk:  · Family history of gout    · Kidney disease or problems with how your kidneys work     · Foods that are high in purines, such as red meat    · Alcohol and tobacco     · Diuretic medicine (water pills), or aspirin    · Medical conditions, such as diabetes, high blood pressure, or high cholesterol  What are the stages of gout? · Hyperuricemia: The first stage starts with high levels of uric acid  Hyperuricemia is not gout, but it increases your risk for gout  You may have no symptoms at this stage, and it usually does not need treatment  · Acute gouty arthritis: The second stage starts with a sudden attack of pain and swelling, usually in 1 joint  The attack may last from a few days to 2 weeks  · Intercritical gout: The third stage is the time between attacks  You may go months or years without another attack   You will not have joint pain or stiffness, but this does not mean your gout is cured  You will still need treatment to prevent chronic gout  · Chronic tophaceous gout: Without treatment, large amounts of uric acid crystals, called tophi, collect around your joints  The crystals can destroy or deform the joints  Gout attacks occur more often, and last hours to weeks  More than 1 joint may be painful and swollen  At this stage, gout symptoms do not go away on their own  What are the signs and symptoms of acute gouty arthritis? · Sudden and severe joint pain that may even wake you     · Fever and chills    · Body aches    · Tiredness or confusion  How is acute gouty arthritis diagnosed? Your healthcare provider will ask about your medicines, health problems, and allergies  Tell him when your joint pain and swelling started  Tell him if you have had surgery  He will check your joints and bones and may do the following tests:  · Blood tests: Your blood is tested for uric acid  You may need to have blood tested more than once  · Synovial fluid test: Synovial fluid surrounds and protects your joints  A needle is used to collect a sample of fluid from around your painful joint  The fluid is sent to a lab to check for uric acid crystals  How is acute gouty arthritis treated? The following can make your symptoms stop sooner, prevent attacks, and decrease your risk of joint damage:  · NSAIDs help decrease swelling and pain or fever  This medicine is available with or without a doctor's order  NSAIDs can cause stomach bleeding or kidney problems in certain people  If you take blood thinner medicine, always ask your healthcare provider if NSAIDs are safe for you  Always read the medicine label and follow directions  · Gout medicine: This medicine decreases joint pain and swelling  It may also be given to prevent new gout attacks  · Steroids: Steroids reduce inflammation and can help your joint stiffness and pain during gout attacks      · Uric acid medicine: You may be given medicine to reduce uric acid production, or to pass more uric acid when you urinate  What are the risks of acute gouty arthritis? More than 1 joint may be painful  Joint pain and swelling may last longer with each attack  One or more of your joints may get infected, and your bones may be damaged  You may need surgery on 1 or more of your joints  High uric acid levels also increase your risk of heart and blood vessel diseases, and kidney stones  How can I manage my acute gouty arthritis? · Rest: You may need to rest your painful joint so that it can heal      · Ice: Ice decreases pain and swelling  Put crushed ice in a plastic bag and cover it with a towel  Put the ice on your painful joint for 15 to 20 minutes every hour  · Elevate: Raise your joint above the level of your heart as often as you can  This will help decrease pain and swelling  Prop your painful joint on pillows to keep it above your heart comfortably  How can I prevent gout attacks? · Do not eat high-purine foods: These foods include meats, seafood, asparagus, spinach, cauliflower, and some types of beans  Healthcare providers may tell you to eat more low-fat milk products, such as yogurt  Milk products may decrease your risk of gout attacks  Vitamin C and coffee may also help  Ask your healthcare provider about the best food plan for you  · Drink water as directed: Water helps remove uric acid from your body  Ask your healthcare provider how much water to drink each day  · Vania Maser your weight: Weight loss may decrease the amount of uric acid in your body  Exercise can help you lose weight  Talk to your healthcare provider about the best exercises for you  · Control your blood sugar level: Keep your blood sugar level in a normal range  This can help prevent gout attacks  · Limit or avoid alcohol: Alcohol can trigger a gout attack  Ask your healthcare provider if alcohol is safe for you    When should I contact my healthcare provider? Contact your healthcare provider if:  · You have a fever, chills, or body aches  · You are confused or more tired than usual      · You have new symptoms, such as a rash, after you start gout treatment  · Your joint pain and swelling do not go away, even after treatment  · You are not urinating as much or as often as you usually do  · You have trouble taking your gout medicines  When should I seek immediate help? Seek help immediately or call 911 if:  · You have severe joint pain that you cannot tolerate  CARE AGREEMENT:   You have the right to help plan your care  Learn about your health condition and how it may be treated  Discuss treatment options with your caregivers to decide what care you want to receive  You always have the right to refuse treatment  The above information is an  only  It is not intended as medical advice for individual conditions or treatments  Talk to your doctor, nurse or pharmacist before following any medical regimen to see if it is safe and effective for you  © 2014 [address] is for End User's use only and may not be sold, redistributed or otherwise used for commercial purposes  All illustrations and images included in CareNotes® are the copyrighted property of [employer]  or [employer]igar Jerez

## 2020-01-21 DIAGNOSIS — M10.9 GOUT, UNSPECIFIED CAUSE, UNSPECIFIED CHRONICITY, UNSPECIFIED SITE: ICD-10-CM

## 2020-01-21 RX ORDER — COLCHICINE 0.6 MG/1
TABLET ORAL
Qty: 30 TABLET | Refills: 0 | OUTPATIENT
Start: 2020-01-21

## 2020-01-25 ENCOUNTER — APPOINTMENT (OUTPATIENT)
Dept: LAB | Facility: MEDICAL CENTER | Age: 40
End: 2020-01-25
Payer: COMMERCIAL

## 2020-01-25 ENCOUNTER — TRANSCRIBE ORDERS (OUTPATIENT)
Dept: LAB | Facility: MEDICAL CENTER | Age: 40
End: 2020-01-25

## 2020-01-25 DIAGNOSIS — M1A.9XX0 CHRONIC GOUT WITHOUT TOPHUS, UNSPECIFIED CAUSE, UNSPECIFIED SITE: ICD-10-CM

## 2020-01-25 DIAGNOSIS — Z13.220 SCREENING FOR HYPERLIPIDEMIA: ICD-10-CM

## 2020-01-25 DIAGNOSIS — I10 ESSENTIAL HYPERTENSION: ICD-10-CM

## 2020-01-25 LAB
ALBUMIN SERPL BCP-MCNC: 4.1 G/DL (ref 3.5–5)
ALP SERPL-CCNC: 71 U/L (ref 46–116)
ALT SERPL W P-5'-P-CCNC: 37 U/L (ref 12–78)
ANION GAP SERPL CALCULATED.3IONS-SCNC: 5 MMOL/L (ref 4–13)
AST SERPL W P-5'-P-CCNC: 17 U/L (ref 5–45)
BASOPHILS # BLD AUTO: 0.05 THOUSANDS/ΜL (ref 0–0.1)
BASOPHILS NFR BLD AUTO: 1 % (ref 0–1)
BILIRUB SERPL-MCNC: 0.77 MG/DL (ref 0.2–1)
BUN SERPL-MCNC: 11 MG/DL (ref 5–25)
CALCIUM SERPL-MCNC: 9.4 MG/DL (ref 8.3–10.1)
CHLORIDE SERPL-SCNC: 107 MMOL/L (ref 100–108)
CHOLEST SERPL-MCNC: 208 MG/DL (ref 50–200)
CO2 SERPL-SCNC: 28 MMOL/L (ref 21–32)
CREAT SERPL-MCNC: 1.07 MG/DL (ref 0.6–1.3)
EOSINOPHIL # BLD AUTO: 0.1 THOUSAND/ΜL (ref 0–0.61)
EOSINOPHIL NFR BLD AUTO: 1 % (ref 0–6)
ERYTHROCYTE [DISTWIDTH] IN BLOOD BY AUTOMATED COUNT: 12.4 % (ref 11.6–15.1)
GFR SERPL CREATININE-BSD FRML MDRD: 87 ML/MIN/1.73SQ M
GLUCOSE P FAST SERPL-MCNC: 87 MG/DL (ref 65–99)
HCT VFR BLD AUTO: 44.7 % (ref 36.5–49.3)
HDLC SERPL-MCNC: 33 MG/DL
HGB BLD-MCNC: 15.1 G/DL (ref 12–17)
IMM GRANULOCYTES # BLD AUTO: 0.05 THOUSAND/UL (ref 0–0.2)
IMM GRANULOCYTES NFR BLD AUTO: 1 % (ref 0–2)
LDLC SERPL CALC-MCNC: 118 MG/DL (ref 0–100)
LYMPHOCYTES # BLD AUTO: 2.35 THOUSANDS/ΜL (ref 0.6–4.47)
LYMPHOCYTES NFR BLD AUTO: 27 % (ref 14–44)
MCH RBC QN AUTO: 29.1 PG (ref 26.8–34.3)
MCHC RBC AUTO-ENTMCNC: 33.8 G/DL (ref 31.4–37.4)
MCV RBC AUTO: 86 FL (ref 82–98)
MONOCYTES # BLD AUTO: 0.58 THOUSAND/ΜL (ref 0.17–1.22)
MONOCYTES NFR BLD AUTO: 7 % (ref 4–12)
NEUTROPHILS # BLD AUTO: 5.73 THOUSANDS/ΜL (ref 1.85–7.62)
NEUTS SEG NFR BLD AUTO: 63 % (ref 43–75)
NONHDLC SERPL-MCNC: 175 MG/DL
NRBC BLD AUTO-RTO: 0 /100 WBCS
PLATELET # BLD AUTO: 238 THOUSANDS/UL (ref 149–390)
PMV BLD AUTO: 10.4 FL (ref 8.9–12.7)
POTASSIUM SERPL-SCNC: 4.2 MMOL/L (ref 3.5–5.3)
PROT SERPL-MCNC: 7.6 G/DL (ref 6.4–8.2)
RBC # BLD AUTO: 5.19 MILLION/UL (ref 3.88–5.62)
SODIUM SERPL-SCNC: 140 MMOL/L (ref 136–145)
TRIGL SERPL-MCNC: 285 MG/DL
URATE SERPL-MCNC: 9.4 MG/DL (ref 4.2–8)
WBC # BLD AUTO: 8.86 THOUSAND/UL (ref 4.31–10.16)

## 2020-01-25 PROCEDURE — 84550 ASSAY OF BLOOD/URIC ACID: CPT

## 2020-01-25 PROCEDURE — 85025 COMPLETE CBC W/AUTO DIFF WBC: CPT

## 2020-01-25 PROCEDURE — 80061 LIPID PANEL: CPT

## 2020-01-25 PROCEDURE — 80053 COMPREHEN METABOLIC PANEL: CPT

## 2020-01-25 PROCEDURE — 36415 COLL VENOUS BLD VENIPUNCTURE: CPT

## 2020-01-27 DIAGNOSIS — M10.9 GOUT, UNSPECIFIED CAUSE, UNSPECIFIED CHRONICITY, UNSPECIFIED SITE: Primary | ICD-10-CM

## 2020-01-27 RX ORDER — ALLOPURINOL 100 MG/1
100 TABLET ORAL DAILY
Qty: 30 TABLET | Refills: 2 | Status: SHIPPED | OUTPATIENT
Start: 2020-01-27 | End: 2020-04-15

## 2020-03-05 DIAGNOSIS — M10.9 GOUT, UNSPECIFIED CAUSE, UNSPECIFIED CHRONICITY, UNSPECIFIED SITE: ICD-10-CM

## 2020-03-05 DIAGNOSIS — I10 ESSENTIAL HYPERTENSION: ICD-10-CM

## 2020-03-05 RX ORDER — COLCHICINE 0.6 MG/1
TABLET ORAL
Qty: 30 TABLET | Refills: 1 | Status: SHIPPED | OUTPATIENT
Start: 2020-03-05 | End: 2020-07-13

## 2020-03-05 RX ORDER — LISINOPRIL 20 MG/1
TABLET ORAL
Qty: 90 TABLET | Refills: 0 | Status: SHIPPED | OUTPATIENT
Start: 2020-03-05 | End: 2020-06-01

## 2020-03-24 ENCOUNTER — TELEPHONE (OUTPATIENT)
Dept: FAMILY MEDICINE CLINIC | Facility: CLINIC | Age: 40
End: 2020-03-24

## 2020-03-24 NOTE — TELEPHONE ENCOUNTER
Patient was diag with PTSD  His job is requesting that he start working swing shifts but afraid that is going to aggravate the PTSD  They said to avoid him going on swing shifts he would need a note from you stating this  Please advise

## 2020-03-24 NOTE — TELEPHONE ENCOUNTER
Okay, so it is more related to his anxiety  I can write him a note  Does he have an email address that we can send it to? I will print the note

## 2020-03-24 NOTE — TELEPHONE ENCOUNTER
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

## 2020-03-24 NOTE — TELEPHONE ENCOUNTER
Yes he told employer he has it, they need a note stating that he has it and might trigger it if change of shifts

## 2020-03-24 NOTE — TELEPHONE ENCOUNTER
Okay, I can put that in note if patient is okay with medical diagnosis being visible to employer  I will print a new note

## 2020-03-24 NOTE — TELEPHONE ENCOUNTER
Patient's wife called back to report, employer requires note to read that "working swing shift would exacerbate his PTSD "  Will not accept due to his "current medical problems  "

## 2020-04-05 DIAGNOSIS — F41.9 ANXIETY: ICD-10-CM

## 2020-04-05 DIAGNOSIS — F43.10 PTSD (POST-TRAUMATIC STRESS DISORDER): ICD-10-CM

## 2020-04-06 RX ORDER — PAROXETINE HYDROCHLORIDE 37.5 MG/1
TABLET, FILM COATED, EXTENDED RELEASE ORAL
Qty: 90 TABLET | Refills: 0 | Status: SHIPPED | OUTPATIENT
Start: 2020-04-06 | End: 2020-07-27

## 2020-04-15 DIAGNOSIS — M10.9 GOUT, UNSPECIFIED CAUSE, UNSPECIFIED CHRONICITY, UNSPECIFIED SITE: ICD-10-CM

## 2020-04-15 RX ORDER — ALLOPURINOL 100 MG/1
TABLET ORAL
Qty: 90 TABLET | Refills: 1 | Status: SHIPPED | OUTPATIENT
Start: 2020-04-15 | End: 2020-10-01

## 2020-05-17 DIAGNOSIS — F41.9 ANXIETY: ICD-10-CM

## 2020-05-17 DIAGNOSIS — F43.10 PTSD (POST-TRAUMATIC STRESS DISORDER): ICD-10-CM

## 2020-05-18 RX ORDER — PAROXETINE HCL 25 MG
TABLET, EXTENDED RELEASE 24 HR ORAL
Qty: 30 TABLET | Refills: 2 | Status: SHIPPED | OUTPATIENT
Start: 2020-05-18 | End: 2020-08-10 | Stop reason: SDUPTHER

## 2020-05-31 DIAGNOSIS — I10 ESSENTIAL HYPERTENSION: ICD-10-CM

## 2020-06-01 RX ORDER — LISINOPRIL 20 MG/1
TABLET ORAL
Qty: 90 TABLET | Refills: 1 | Status: SHIPPED | OUTPATIENT
Start: 2020-06-01 | End: 2020-08-10 | Stop reason: SDUPTHER

## 2020-06-29 DIAGNOSIS — I10 ESSENTIAL HYPERTENSION: ICD-10-CM

## 2020-06-29 RX ORDER — AMLODIPINE BESYLATE 5 MG/1
TABLET ORAL
Qty: 90 TABLET | Refills: 0 | Status: SHIPPED | OUTPATIENT
Start: 2020-06-29 | End: 2020-08-10 | Stop reason: SDUPTHER

## 2020-07-06 ENCOUNTER — TELEPHONE (OUTPATIENT)
Dept: FAMILY MEDICINE CLINIC | Facility: CLINIC | Age: 40
End: 2020-07-06

## 2020-07-06 ENCOUNTER — TELEPHONE (OUTPATIENT)
Dept: PSYCHIATRY | Facility: CLINIC | Age: 40
End: 2020-07-06

## 2020-07-06 DIAGNOSIS — F32.A DEPRESSION, UNSPECIFIED DEPRESSION TYPE: ICD-10-CM

## 2020-07-06 DIAGNOSIS — F41.9 ANXIETY: ICD-10-CM

## 2020-07-06 DIAGNOSIS — F43.10 PTSD (POST-TRAUMATIC STRESS DISORDER): Primary | ICD-10-CM

## 2020-07-06 NOTE — TELEPHONE ENCOUNTER
I would still like him to see psych, but I have no problem renewing his short term disability if needed  If he wants me to fill out the paperwork I would just ask that he come in for a visit  If he needs anything else please have him let us know

## 2020-07-06 NOTE — TELEPHONE ENCOUNTER
Absolutely, I placed a referral to [id number] [place] [address] 100 group in [facility]  If he does not hear from them in the next few days to schedule please let us know  He is also welcome to call himself to see how soon they can get him in        The phone number is [phone]

## 2020-07-06 NOTE — TELEPHONE ENCOUNTER
----- Message from Peter Madrigal sent at 7/6/2020  2:01 PM EDT -----  Regarding: FW: Reply  Contact: [phone]      ----- Message -----  From: Kayode Cevallos  Sent: 7/6/2020   1:54 PM EDT  To: [facility]  Subject: RE: Reply                                        Thank you for your quick response  I personally took myself out of work starting today till I can get myself feeling well mentally again  Suffering PTSD, anxiety and major depressive disorder sucks the life right out of you  I called my employer today and I do have STD (paid leave upon provided documents) with Denise ashby and 12 weeks of FMLA I was told I am qualified to use  I would prefer to exercise that option as financial loss would just add to this  I would like nothing more than the support (which my family and I know Cathy Perez is the best) from Cathy Perez and any doctor I see to help me understand my condition and learn  I have so many questions and why and how to cope  Thank you all      ----- Message -----  From: Jammie Moreira MA  Sent: 7/6/20, 1:33 PM  To: Kayode Cevallos  Subject: Reply    Micaela West's response:         Absolutely, I placed a referral to [id number] [place] [address] 100 group in [facility]  If he does not hear from them in the next few days to schedule please let us know       He is also welcome to call himself to see how soon they can get him in  The phone number is [phone]    Give us a call if you need anything else

## 2020-07-06 NOTE — TELEPHONE ENCOUNTER
----- Message from Rangel Mcdonald sent at 7/6/2020 12:58 PM EDT -----  Regarding: FW: Non-Urgent Medical Question  Contact: [phone]      ----- Message -----  From: Kayode Cevallos  Sent: 7/6/2020  12:53 PM EDT  To: [address] [facility]  Subject: Non-Urgent Medical Question                      Zenaida Canavan  I was wondering if you could help me get into a Psychiatrist within [facility]  We spoke about this at my last appointment  I'm off of work today not feeling well mentally  I'm really depressed and my stress and symptoms of PTSD are bad  Could you help? Thank you

## 2020-07-06 NOTE — TELEPHONE ENCOUNTER
Spoke to Gill today about setting up an appointment with dr Eron Martínez for 9/1/20 at 10am or 1pm  He's going to call terrance minaya about setting up something sooner   If unsuccessful, I told him the opening is still available for 9/1/20

## 2020-07-07 ENCOUNTER — OFFICE VISIT (OUTPATIENT)
Dept: FAMILY MEDICINE CLINIC | Facility: CLINIC | Age: 40
End: 2020-07-07
Payer: COMMERCIAL

## 2020-07-07 ENCOUNTER — TELEPHONE (OUTPATIENT)
Dept: PSYCHIATRY | Facility: CLINIC | Age: 40
End: 2020-07-07

## 2020-07-07 ENCOUNTER — TELEPHONE (OUTPATIENT)
Dept: FAMILY MEDICINE CLINIC | Facility: CLINIC | Age: 40
End: 2020-07-07

## 2020-07-07 VITALS
BODY MASS INDEX: 38.83 KG/M2 | WEIGHT: 247.4 LBS | DIASTOLIC BLOOD PRESSURE: 84 MMHG | SYSTOLIC BLOOD PRESSURE: 152 MMHG | HEIGHT: 67 IN | OXYGEN SATURATION: 98 % | TEMPERATURE: 98.5 F | HEART RATE: 93 BPM

## 2020-07-07 DIAGNOSIS — F43.10 PTSD (POST-TRAUMATIC STRESS DISORDER): Primary | ICD-10-CM

## 2020-07-07 DIAGNOSIS — F33.41 RECURRENT MAJOR DEPRESSIVE DISORDER, IN PARTIAL REMISSION (HCC): ICD-10-CM

## 2020-07-07 DIAGNOSIS — F41.9 ANXIETY: ICD-10-CM

## 2020-07-07 PROCEDURE — 3077F SYST BP >= 140 MM HG: CPT | Performed by: PHYSICIAN ASSISTANT

## 2020-07-07 PROCEDURE — 99214 OFFICE O/P EST MOD 30 MIN: CPT | Performed by: PHYSICIAN ASSISTANT

## 2020-07-07 PROCEDURE — 1036F TOBACCO NON-USER: CPT | Performed by: PHYSICIAN ASSISTANT

## 2020-07-07 PROCEDURE — 3079F DIAST BP 80-89 MM HG: CPT | Performed by: PHYSICIAN ASSISTANT

## 2020-07-07 PROCEDURE — 3008F BODY MASS INDEX DOCD: CPT | Performed by: PHYSICIAN ASSISTANT

## 2020-07-07 RX ORDER — BUSPIRONE HYDROCHLORIDE 5 MG/1
5 TABLET ORAL 3 TIMES DAILY
Qty: 90 TABLET | Refills: 0 | Status: SHIPPED | OUTPATIENT
Start: 2020-07-07 | End: 2020-08-05
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
## 2020-07-07 NOTE — TELEPHONE ENCOUNTER
Please let Selin Mcfarland know that I received his message, and that is fantastic! Very happy he got a sooner appointment  If he has any other problems or concerns he should let us know

## 2020-07-07 NOTE — TELEPHONE ENCOUNTER
Behavorial Health Outpatient Intake Questions    Referred by: PCP    Please advised interviewee that they need to answer all questions truthfully to allow for best care and any misrepresentations of information may affect their ability to be seen at this clinic   => Was this discussed? Yes     [facility] -     Presenting Problem (in patient's words): PTSD anxiety depression    Are there any developmental disabilities? ? If yes, can they speak to you on the phone? If they are too limited to speak to you on phone, refer out No    Are you taking any psychiatric medications? No    => If yes, who prescribes? If yes, are they injectable medications? Does the patient have a language barrier or hearing impairment? No    Have you been treated at [address] [place] [address] by a therapist or a doctor in the past? If yes, who? No    Has the patient been hospitalized for mental health? No   If yes, how long ago was last hospitalization and where was it? Do you actively use alcohol or marijuana or illegal substances? If yes, what and how much - refer out to Drug and alcohol treatment if use is excessive or daily use of illegal substances No concerns of substance abuse are reported  Do you have a community treatment team or ? No    Legal History-     Does the patient have any history of arrests, shelter/FCI time, or DUIs? No  If Yes-  1) What types of charges? 2) When were they last incarcerated? 3) Are they currently on parole or probation? Minor Child-    Who has custody of the child? Is there a custody agreement? If there is a custody agreement remind parent that they must bring a copy to the first appt or they will not be seen       Intake Team, please check with provider before scheduling if flags come up such as:  - complex case  - legal history (other than DUI)  - communication barrier concerns are present  - if, in your judgment, this needs further review    ACCEPTED as a patient Yes  => Appointment Date: 7/20/20 with Pastor Cobos     Referred Elsewhere? No    Name of Insurance Co: [employer] ID#  Insurance Phone #  If ins is primary or secondary  If patient is a minor, parents information such as Name, D  O B of guarantor

## 2020-07-07 NOTE — TELEPHONE ENCOUNTER
----- Message from Eulalia Jerez sent at 7/7/2020  1:08 PM EDT -----  Regarding: FW: Non-Urgent Medical Question  Contact: [phone]      ----- Message -----  From: Dayo Chilel  Sent: 7/7/2020   1:02 PM EDT  To: [facility]  Subject: Non-Urgent Medical Question                      Martha Siu  Just a update from our discussion this morning  I have an appointment Monday July 20 at 11am at the [address] [place] [address]  Good start  Thank you for being a great family doctor  Have a great day

## 2020-07-13 ENCOUNTER — TELEPHONE (OUTPATIENT)
Dept: FAMILY MEDICINE CLINIC | Facility: CLINIC | Age: 40
End: 2020-07-13

## 2020-07-13 DIAGNOSIS — M10.9 GOUT, UNSPECIFIED CAUSE, UNSPECIFIED CHRONICITY, UNSPECIFIED SITE: ICD-10-CM

## 2020-07-13 RX ORDER — COLCHICINE 0.6 MG/1
TABLET ORAL
Qty: 30 TABLET | Refills: 1 | Status: SHIPPED | OUTPATIENT
Start: 2020-07-13 | End: 2020-08-10 | Stop reason: SDUPTHER

## 2020-07-13 NOTE — TELEPHONE ENCOUNTER
----- Message from Patricia Flor sent at 7/13/2020  4:19 PM EDT -----  Regarding: FW: Prescription Question  Contact: [phone]      ----- Message -----  From: Shakira Smith  Sent: 7/13/2020   4:10 PM EDT  To: [facility]  Subject: Prescription Question                            Good afternoon  Could Darline Villeda please refill my colchacin   06mg tablet? Having bad gout flare up in wrist/thumb  Also did Uzma send you ladies over any paperwork to fill out for my short term? I spoke with a rep today  If I owe for filling these out please let me know   Thank you so much

## 2020-07-20 ENCOUNTER — SOCIAL WORK (OUTPATIENT)
Dept: BEHAVIORAL/MENTAL HEALTH CLINIC | Facility: CLINIC | Age: 40
End: 2020-07-20
Payer: COMMERCIAL

## 2020-07-20 DIAGNOSIS — F43.10 PTSD (POST-TRAUMATIC STRESS DISORDER): Primary | ICD-10-CM

## 2020-07-20 DIAGNOSIS — F33.1 MODERATE EPISODE OF RECURRENT MAJOR DEPRESSIVE DISORDER (HCC): ICD-10-CM

## 2020-07-20 PROCEDURE — 90791 PSYCH DIAGNOSTIC EVALUATION: CPT | Performed by: SOCIAL WORKER

## 2020-07-20 PROCEDURE — 1036F TOBACCO NON-USER: CPT | Performed by: SOCIAL WORKER

## 2020-07-22 PROBLEM — F33.1 MODERATE EPISODE OF RECURRENT MAJOR DEPRESSIVE DISORDER (HCC): Status: ACTIVE | Noted: 2017-10-11

## 2020-07-26 DIAGNOSIS — F43.10 PTSD (POST-TRAUMATIC STRESS DISORDER): ICD-10-CM

## 2020-07-26 DIAGNOSIS — F41.9 ANXIETY: ICD-10-CM

## 2020-07-27 ENCOUNTER — SOCIAL WORK (OUTPATIENT)
Dept: BEHAVIORAL/MENTAL HEALTH CLINIC | Facility: CLINIC | Age: 40
End: 2020-07-27
Payer: COMMERCIAL

## 2020-07-27 DIAGNOSIS — F43.10 PTSD (POST-TRAUMATIC STRESS DISORDER): Primary | ICD-10-CM

## 2020-07-27 DIAGNOSIS — F43.10 PTSD (POST-TRAUMATIC STRESS DISORDER): ICD-10-CM

## 2020-07-27 DIAGNOSIS — F41.9 ANXIETY: ICD-10-CM

## 2020-07-27 DIAGNOSIS — F33.1 MODERATE EPISODE OF RECURRENT MAJOR DEPRESSIVE DISORDER (HCC): ICD-10-CM

## 2020-07-27 PROCEDURE — 1036F TOBACCO NON-USER: CPT | Performed by: SOCIAL WORKER

## 2020-07-27 PROCEDURE — 90834 PSYTX W PT 45 MINUTES: CPT | Performed by: SOCIAL WORKER

## 2020-07-27 RX ORDER — PAROXETINE HYDROCHLORIDE 37.5 MG/1
37.5 TABLET, FILM COATED, EXTENDED RELEASE ORAL EVERY MORNING
Qty: 90 TABLET | Refills: 0 | Status: SHIPPED | OUTPATIENT
Start: 2020-07-27 | End: 2020-08-10 | Stop reason: SDUPTHER

## 2020-07-27 RX ORDER — PAROXETINE HYDROCHLORIDE 37.5 MG/1
TABLET, FILM COATED, EXTENDED RELEASE ORAL
Qty: 90 TABLET | Refills: 0 | Status: SHIPPED | OUTPATIENT
Start: 2020-07-27 | End: 2020-07-27 | Stop reason: SDUPTHER
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
## 2020-07-28 ENCOUNTER — TELEPHONE (OUTPATIENT)
Dept: PSYCHIATRY | Facility: CLINIC | Age: 40
End: 2020-07-28

## 2020-08-05 ENCOUNTER — TELEPHONE (OUTPATIENT)
Dept: FAMILY MEDICINE CLINIC | Facility: CLINIC | Age: 40
End: 2020-08-05

## 2020-08-05 DIAGNOSIS — F41.9 ANXIETY: ICD-10-CM

## 2020-08-05 RX ORDER — BUSPIRONE HYDROCHLORIDE 5 MG/1
TABLET ORAL
Qty: 90 TABLET | Refills: 0 | Status: SHIPPED | OUTPATIENT
Start: 2020-08-05 | End: 2020-08-10 | Stop reason: SDUPTHER

## 2020-08-05 NOTE — TELEPHONE ENCOUNTER
Pt called stating he has been seeing Dr Wesley Amin ([facility]) who has decided on a return to work date of 9/8/2020  Pt does have an appt scheduled w/ you on 8/12 but wanted to give you a heads up in case FUNMILAYO ALFARO  LEYLA [facility] sends you any forms that need to be filled out

## 2020-08-10 DIAGNOSIS — I10 ESSENTIAL HYPERTENSION: ICD-10-CM

## 2020-08-10 DIAGNOSIS — M10.9 GOUT, UNSPECIFIED CAUSE, UNSPECIFIED CHRONICITY, UNSPECIFIED SITE: ICD-10-CM

## 2020-08-10 DIAGNOSIS — F43.10 PTSD (POST-TRAUMATIC STRESS DISORDER): ICD-10-CM

## 2020-08-10 DIAGNOSIS — F41.9 ANXIETY: ICD-10-CM

## 2020-08-10 RX ORDER — PAROXETINE HYDROCHLORIDE 37.5 MG/1
37.5 TABLET, FILM COATED, EXTENDED RELEASE ORAL EVERY MORNING
Qty: 90 TABLET | Refills: 0 | Status: SHIPPED | OUTPATIENT
Start: 2020-08-10 | End: 2020-08-11 | Stop reason: SDUPTHER

## 2020-08-10 RX ORDER — COLCHICINE 0.6 MG/1
0.6 TABLET ORAL DAILY
Qty: 90 TABLET | Refills: 0 | Status: SHIPPED | OUTPATIENT
Start: 2020-08-10 | End: 2020-09-03 | Stop reason: SDUPTHER

## 2020-08-10 RX ORDER — AMLODIPINE BESYLATE 5 MG/1
5 TABLET ORAL DAILY
Qty: 90 TABLET | Refills: 0 | Status: SHIPPED | OUTPATIENT
Start: 2020-08-10 | End: 2020-10-01 | Stop reason: SDUPTHER

## 2020-08-10 RX ORDER — LISINOPRIL 20 MG/1
20 TABLET ORAL DAILY
Qty: 90 TABLET | Refills: 0 | Status: SHIPPED | OUTPATIENT
Start: 2020-08-10 | End: 2020-09-18 | Stop reason: SDUPTHER

## 2020-08-10 RX ORDER — PAROXETINE HCL 25 MG
25 TABLET, EXTENDED RELEASE 24 HR ORAL EVERY MORNING
Qty: 90 TABLET | Refills: 0 | Status: SHIPPED | OUTPATIENT
Start: 2020-08-10 | End: 2020-08-11 | Stop reason: SDUPTHER

## 2020-08-10 RX ORDER — BUSPIRONE HYDROCHLORIDE 5 MG/1
5 TABLET ORAL 3 TIMES DAILY
Qty: 270 TABLET | Refills: 0 | Status: SHIPPED | OUTPATIENT
Start: 2020-08-10 | End: 2020-10-14 | Stop reason: SDUPTHER

## 2020-08-10 NOTE — TELEPHONE ENCOUNTER
Called patient to make sure he is now using [address], he said yes due to his scripts are to expensive  Please sent new scripts for 90 day supplies

## 2020-08-11 ENCOUNTER — TELEPHONE (OUTPATIENT)
Dept: FAMILY MEDICINE CLINIC | Facility: CLINIC | Age: 40
End: 2020-08-11

## 2020-08-11 DIAGNOSIS — F41.9 ANXIETY: ICD-10-CM

## 2020-08-11 DIAGNOSIS — F43.10 PTSD (POST-TRAUMATIC STRESS DISORDER): ICD-10-CM

## 2020-08-11 RX ORDER — PAROXETINE HCL 25 MG
TABLET, EXTENDED RELEASE 24 HR ORAL
Qty: 30 TABLET | Refills: 0 | OUTPATIENT
Start: 2020-08-11

## 2020-08-11 RX ORDER — PAROXETINE HCL 25 MG
25 TABLET, EXTENDED RELEASE 24 HR ORAL EVERY MORNING
Qty: 30 TABLET | Refills: 0 | Status: SHIPPED | OUTPATIENT
Start: 2020-08-11 | End: 2020-08-11 | Stop reason: ALTCHOICE

## 2020-08-11 RX ORDER — PAROXETINE HYDROCHLORIDE 37.5 MG/1
37.5 TABLET, FILM COATED, EXTENDED RELEASE ORAL EVERY MORNING
Qty: 90 TABLET | Refills: 0 | Status: SHIPPED | OUTPATIENT
Start: 2020-08-11 | End: 2020-08-14 | Stop reason: SDUPTHER

## 2020-08-11 RX ORDER — PAROXETINE HYDROCHLORIDE 37.5 MG/1
37.5 TABLET, FILM COATED, EXTENDED RELEASE ORAL EVERY MORNING
Qty: 30 TABLET | Refills: 0 | Status: SHIPPED | OUTPATIENT
Start: 2020-08-11 | End: 2020-08-11

## 2020-08-11 RX ORDER — PAROXETINE HYDROCHLORIDE 25 MG/1
25 TABLET, FILM COATED, EXTENDED RELEASE ORAL EVERY MORNING
Qty: 90 TABLET | Refills: 0 | Status: SHIPPED | OUTPATIENT
Start: 2020-08-11 | End: 2020-08-14 | Stop reason: SDUPTHER

## 2020-08-11 NOTE — TELEPHONE ENCOUNTER
Wife called stating insurance is requiring pt to have a 90 day supply of the Paxil 25 mg and 37 5 mg you sent to [facility]   Also, it needs to be generic not name brand  Can you please send new scripts over

## 2020-08-11 NOTE — TELEPHONE ENCOUNTER
Wife called stating it will take [address] pill pack appx 3 weeks to come in and pt is completely out of his paxil 25 mg and paxil 37 5 mg  Asking if you would send a 3 week prescription for those meds to [facility] to hold him over?

## 2020-08-12 ENCOUNTER — OFFICE VISIT (OUTPATIENT)
Dept: FAMILY MEDICINE CLINIC | Facility: CLINIC | Age: 40
End: 2020-08-12
Payer: COMMERCIAL

## 2020-08-12 VITALS
HEIGHT: 67 IN | OXYGEN SATURATION: 98 % | WEIGHT: 239.6 LBS | TEMPERATURE: 98 F | BODY MASS INDEX: 37.61 KG/M2 | SYSTOLIC BLOOD PRESSURE: 144 MMHG | DIASTOLIC BLOOD PRESSURE: 80 MMHG | HEART RATE: 83 BPM

## 2020-08-12 DIAGNOSIS — F33.1 MODERATE EPISODE OF RECURRENT MAJOR DEPRESSIVE DISORDER (HCC): Primary | ICD-10-CM

## 2020-08-12 DIAGNOSIS — F43.10 PTSD (POST-TRAUMATIC STRESS DISORDER): ICD-10-CM

## 2020-08-12 DIAGNOSIS — F41.9 ANXIETY: ICD-10-CM

## 2020-08-12 PROCEDURE — 1036F TOBACCO NON-USER: CPT | Performed by: PHYSICIAN ASSISTANT

## 2020-08-12 PROCEDURE — 3079F DIAST BP 80-89 MM HG: CPT | Performed by: PHYSICIAN ASSISTANT

## 2020-08-12 PROCEDURE — 3008F BODY MASS INDEX DOCD: CPT | Performed by: PHYSICIAN ASSISTANT

## 2020-08-12 PROCEDURE — 99213 OFFICE O/P EST LOW 20 MIN: CPT | Performed by: PHYSICIAN ASSISTANT

## 2020-08-12 PROCEDURE — 3077F SYST BP >= 140 MM HG: CPT | Performed by: PHYSICIAN ASSISTANT
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
## 2020-08-14 DIAGNOSIS — F43.10 PTSD (POST-TRAUMATIC STRESS DISORDER): ICD-10-CM

## 2020-08-14 DIAGNOSIS — F41.9 ANXIETY: ICD-10-CM

## 2020-08-14 RX ORDER — PAROXETINE HYDROCHLORIDE 37.5 MG/1
37.5 TABLET, FILM COATED, EXTENDED RELEASE ORAL EVERY MORNING
Qty: 90 TABLET | Refills: 1 | Status: SHIPPED | OUTPATIENT
Start: 2020-08-14 | End: 2020-10-14 | Stop reason: SDUPTHER

## 2020-08-14 RX ORDER — PAROXETINE HYDROCHLORIDE 25 MG/1
25 TABLET, FILM COATED, EXTENDED RELEASE ORAL EVERY MORNING
Qty: 90 TABLET | Refills: 1 | Status: SHIPPED | OUTPATIENT
Start: 2020-08-14 | End: 2020-10-14 | Stop reason: SDUPTHER

## 2020-08-17 ENCOUNTER — SOCIAL WORK (OUTPATIENT)
Dept: BEHAVIORAL/MENTAL HEALTH CLINIC | Facility: CLINIC | Age: 40
End: 2020-08-17
Payer: COMMERCIAL

## 2020-08-17 DIAGNOSIS — F43.10 PTSD (POST-TRAUMATIC STRESS DISORDER): Primary | ICD-10-CM

## 2020-08-17 DIAGNOSIS — F33.1 MODERATE EPISODE OF RECURRENT MAJOR DEPRESSIVE DISORDER (HCC): ICD-10-CM

## 2020-08-17 PROCEDURE — 90834 PSYTX W PT 45 MINUTES: CPT | Performed by: SOCIAL WORKER

## 2020-08-17 NOTE — PSYCH
Psychotherapy Provided: Individual Psychotherapy 45 minutes     Length of time in session: 45 minutes, follow up in 45 week    Goals addressed in session: Goal 1     Pain:      none    0    Current suicide risk : Low     Data: Liliana Taylor states that he is having a lot of anxiety about the upcoming eye appointment  Both eyes are now hurting  There has been significant pressure in the eye that is damaged  He is worried about the future  Nightmares, flashbacks, etc   Discuss living in the present - what we can control - words, actions and location  Also discussed services that are available if needed in the future  Discussed some questions that he could use to discern what he wants to do with whatever options he has  Assessment: Liliana Taylor seems to greatly affected my current medical issues  Affecting mental health progress     Plan: next ov - life goals/ dreams      Behavioral Health Treatment Plan [facility]: Diagnosis and Treatment Plan explained to Hector Roa relates understanding diagnosis and is agreeable to Treatment Plan   Yes

## 2020-08-31 ENCOUNTER — SOCIAL WORK (OUTPATIENT)
Dept: BEHAVIORAL/MENTAL HEALTH CLINIC | Facility: CLINIC | Age: 40
End: 2020-08-31
Payer: COMMERCIAL

## 2020-08-31 ENCOUNTER — TELEPHONE (OUTPATIENT)
Dept: FAMILY MEDICINE CLINIC | Facility: CLINIC | Age: 40
End: 2020-08-31

## 2020-08-31 DIAGNOSIS — F33.1 MODERATE EPISODE OF RECURRENT MAJOR DEPRESSIVE DISORDER (HCC): ICD-10-CM

## 2020-08-31 DIAGNOSIS — F43.10 PTSD (POST-TRAUMATIC STRESS DISORDER): Primary | ICD-10-CM

## 2020-08-31 PROCEDURE — 90834 PSYTX W PT 45 MINUTES: CPT | Performed by: SOCIAL WORKER

## 2020-08-31 NOTE — PSYCH
Psychotherapy Provided: Individual Psychotherapy 45 minutes     Length of time in session: 45 minutes, follow up in 1 week    Goals addressed in session: Goal 1     Pain:      none    0    Current suicide risk : Low     Data: Robert De Guzman discussed the eye appointment  Apparently, the eye that he cannot see anything is still picking up the "floaters" in the eye, and tries to focus  The patch does help with pain and pressure  Discussed today waiting another month before he goes back to work  When going back to work, I recommend going back part time for one month, and then going back full time  Psycho education about triggers, safety/ danger, using peace as a guide for whether to continue potentially toxic relationships,  and also changing expectations to their normal     Assessment: Robert De Guzman seems to be making progress  May be ready to return to work in a month or so  Plan: see above for assigned homework  Next ov - stress management? Behavioral Health Treatment Plan [facility]: Diagnosis and Treatment Plan explained to Jil Mckeon relates understanding diagnosis and is agreeable to Treatment Plan   Yes

## 2020-08-31 NOTE — TELEPHONE ENCOUNTER
----- Message from Wilmer Chin sent at 8/31/2020  3:52 PM EDT -----  Regarding: FW: Non-Urgent Medical Question  Contact: [phone]    ----- Message -----  From: Taylor Vides  Sent: 8/31/2020   3:45 PM EDT  To: [facility] Clinical  Subject: Non-Urgent Medical Question                      Stapleton Blew and staff  I am writing to inform Finesse Figueroa that Dr Paco Solis has decided to push back my return to work date till Monday October 5, 2020 and said Myranda Guillen should be able to see notes pertaining to such  I do see Finesse Figueroa on Thursday September 3 at 8:30am this week and can discuss any more plans he and I are having and so forth  I thank you all so much for the help you have been for myself with this matter  It's truly appreciated  Also, my meds I am on I unfortunately got denied [employer] Pill pack after all that work you ladies all had done for me  Apparently my employer and [employer] have an agreement on monthly maintenance medications none of us were aware of  Amazing  Anyways, all my scripts can go to [facility] in [place] again  What a mess  Thank you all and have a great day

## 2020-08-31 NOTE — TELEPHONE ENCOUNTER
Okay, no problem  We can discuss at visit   Does he need refills sent now to [facility]?

## 2020-09-03 ENCOUNTER — OFFICE VISIT (OUTPATIENT)
Dept: FAMILY MEDICINE CLINIC | Facility: CLINIC | Age: 40
End: 2020-09-03
Payer: COMMERCIAL

## 2020-09-03 VITALS
HEIGHT: 67 IN | DIASTOLIC BLOOD PRESSURE: 86 MMHG | TEMPERATURE: 97.7 F | SYSTOLIC BLOOD PRESSURE: 138 MMHG | HEART RATE: 84 BPM | BODY MASS INDEX: 38.39 KG/M2 | WEIGHT: 244.6 LBS | OXYGEN SATURATION: 98 %

## 2020-09-03 DIAGNOSIS — F33.1 MODERATE EPISODE OF RECURRENT MAJOR DEPRESSIVE DISORDER (HCC): ICD-10-CM

## 2020-09-03 DIAGNOSIS — I10 ESSENTIAL HYPERTENSION: ICD-10-CM

## 2020-09-03 DIAGNOSIS — F43.10 PTSD (POST-TRAUMATIC STRESS DISORDER): ICD-10-CM

## 2020-09-03 DIAGNOSIS — M10.9 GOUT, UNSPECIFIED CAUSE, UNSPECIFIED CHRONICITY, UNSPECIFIED SITE: Primary | ICD-10-CM

## 2020-09-03 PROCEDURE — 99213 OFFICE O/P EST LOW 20 MIN: CPT | Performed by: PHYSICIAN ASSISTANT

## 2020-09-03 PROCEDURE — 1036F TOBACCO NON-USER: CPT | Performed by: PHYSICIAN ASSISTANT

## 2020-09-03 PROCEDURE — 3079F DIAST BP 80-89 MM HG: CPT | Performed by: PHYSICIAN ASSISTANT

## 2020-09-03 RX ORDER — COLCHICINE 0.6 MG/1
0.6 TABLET ORAL DAILY
Qty: 90 TABLET | Refills: 1 | Status: SHIPPED | OUTPATIENT
Start: 2020-09-03 | End: 2021-03-01

## 2020-09-03 NOTE — PROGRESS NOTES
Assessment/Plan:    Problem List Items Addressed This Visit        Cardiovascular and Mediastinum    Essential hypertension       Other    PTSD (post-traumatic stress disorder)    Moderate episode of recurrent major depressive disorder ([facility]      Other Visit Diagnoses     Gout, unspecified cause, unspecified chronicity, unspecified site    -  Primary    Relevant Medications    colchicine (COLCRYS) 0 6 mg tablet    BMI 38 0-38 9,adult               Diagnoses and all orders for this visit:    Gout, unspecified cause, unspecified chronicity, unspecified site  -     colchicine (COLCRYS) 0 6 mg tablet; Take 1 tablet (0 6 mg total) by mouth daily    PTSD (post-traumatic stress disorder)    Moderate episode of recurrent major depressive disorder ([facility] )    Essential hypertension    BMI 38 0-38 9,adult        Etta Saul will continue same medications and continue therapy sessions  He has been making good progress  He will return for a follow-up in 1-2 months or sooner if needed  Subjective:      Patient ID: Ileana Phillip is a 45 y o  male  Etta Saul is a very pleasant 45year old male who is here to day for a follow-up for anxiety, depression, and PTSD  He has been out of work on short term disability  He is seeing Dr Angus Jorgensen at [facility] on a weekly basis  He admits that he is still making a lot of progress  He has been able to realize how to handle things that are not in his control  He feels like he is not lashing out at his loved ones as often  He feels like the Buspar is helping a lot  He does not feel like he needs a dose adjustment  He is still taking Paxil regularly, which also helps  He was switched from brand to generic due to cost, and has not noticed a difference  He is getting a lot of support from his wife  His children are doing well, and he notices that he has more patience with them  He is still following with [facility] for his chronic eye issues   He denies any suicidal or homicidal thoughts  He and his therapist have agreed on setting a back to work date on October 5th  He denies any other issues or concerns at this time  The following portions of the patient's history were reviewed and updated as appropriate:   He has a past medical history of Blindness of left eye and Knee pain, right ,  does not have any pertinent problems on file  ,   has a past surgical history that includes Eye surgery; Neck surgery; Wrist surgery; Hand surgery; and FL injection right knee (arthrogram) (5/21/2019)  ,  family history includes Anxiety disorder in his mother; Depression in his mother; Diabetes in his mother; Hypertension in his mother  ,   reports that he has never smoked  He has never used smokeless tobacco  He reports that he does not drink alcohol or use drugs  ,  is allergic to amoxil [amoxicillin]; ciprofloxacin; and penicillins     Current Outpatient Medications   Medication Sig Dispense Refill    allopurinol (ZYLOPRIM) 100 mg tablet TAKE 1 TABLET BY MOUTH EVERY DAY 90 tablet 1    amLODIPine (NORVASC) 5 mg tablet Take 1 tablet (5 mg total) by mouth daily 90 tablet 0    brimonidine (ALPHAGAN P) 0 15 % ophthalmic solution 1 drop 3 (three) times a day      busPIRone (BUSPAR) 5 mg tablet Take 1 tablet (5 mg total) by mouth 3 (three) times a day 270 tablet 0    colchicine (COLCRYS) 0 6 mg tablet Take 1 tablet (0 6 mg total) by mouth daily 90 tablet 1    dorzolamide (TRUSOPT) 2 % ophthalmic solution 1 drop 3 (three) times a day      lisinopril (ZESTRIL) 20 mg tablet Take 1 tablet (20 mg total) by mouth daily 90 tablet 0    PARoxetine (PAXIL-CR) 25 mg 24 hr tablet Take 1 tablet (25 mg total) by mouth every morning In addition to 37 5 mg tablet to total 62 5 mg total 90 tablet 1    PARoxetine (PAXIL-CR) 37 5 mg 24 hr tablet Take 1 tablet (37 5 mg total) by mouth every morning In addition to 25 mg tablet to total 62 5 mg total 90 tablet 1    prednisoLONE acetate (PRED FORTE) 1 % ophthalmic suspension 1 drop 4 (four) times a day       No current facility-administered medications for this visit  Review of Systems   Constitutional: Negative for chills, diaphoresis, fatigue and fever  HENT: Negative for congestion, ear pain, postnasal drip, rhinorrhea, sneezing, sore throat and trouble swallowing  Eyes: Positive for redness (left eye) and visual disturbance (blindness of left eye)  Negative for pain  Respiratory: Negative for apnea, cough, shortness of breath and wheezing  Cardiovascular: Negative for chest pain and palpitations  Gastrointestinal: Negative for abdominal pain, constipation, diarrhea, nausea and vomiting  Genitourinary: Negative for dysuria and hematuria  Musculoskeletal: Negative for arthralgias, gait problem and myalgias  Neurological: Negative for dizziness, syncope, weakness, light-headedness, numbness and headaches  Psychiatric/Behavioral: Positive for dysphoric mood  Negative for suicidal ideas  The patient is nervous/anxious  Objective:  Vitals:    09/03/20 0833   BP: 138/86   Pulse: 84   Temp: 97 7 °F (36 5 °C)   SpO2: 98%   Weight: 111 kg (244 lb 9 6 oz)   Height: 5' 7" (1 702 m)     Body mass index is 38 31 kg/m²  Physical Exam  Vitals signs and nursing note reviewed  Constitutional:       Appearance: He is well-developed  HENT:      Head: Normocephalic and atraumatic  Right Ear: External ear normal       Left Ear: External ear normal    Eyes:      Extraocular Movements: Extraocular movements intact  Conjunctiva/sclera:      Left eye: Left conjunctiva is injected  Neck:      Musculoskeletal: Normal range of motion and neck supple  Cardiovascular:      Rate and Rhythm: Normal rate and regular rhythm  Heart sounds: Normal heart sounds  No murmur  No friction rub  No gallop  Pulmonary:      Effort: Pulmonary effort is normal  No respiratory distress  Breath sounds: Normal breath sounds  No wheezing or rales  Musculoskeletal: Normal range of motion  Lymphadenopathy:      Cervical: No cervical adenopathy  Skin:     General: Skin is warm and dry  Neurological:      Mental Status: He is alert and oriented to person, place, and time  Psychiatric:         Mood and Affect: Mood and affect normal          Speech: Speech normal          Behavior: Behavior normal          Thought Content: Thought content normal  Thought content does not include homicidal or suicidal ideation  Thought content does not include homicidal or suicidal plan  Judgment: Judgment normal          BMI Counseling: Body mass index is 38 31 kg/m²  The BMI is above normal  Nutrition recommendations include decreasing overall calorie intake, 3-5 servings of fruits/vegetables daily and consuming healthier snacks  Exercise recommendations include exercising 3-5 times per week

## 2020-09-14 ENCOUNTER — SOCIAL WORK (OUTPATIENT)
Dept: BEHAVIORAL/MENTAL HEALTH CLINIC | Facility: CLINIC | Age: 40
End: 2020-09-14
Payer: COMMERCIAL

## 2020-09-14 DIAGNOSIS — F43.10 PTSD (POST-TRAUMATIC STRESS DISORDER): Primary | ICD-10-CM

## 2020-09-14 DIAGNOSIS — F33.1 MODERATE EPISODE OF RECURRENT MAJOR DEPRESSIVE DISORDER (HCC): ICD-10-CM

## 2020-09-14 PROCEDURE — 90834 PSYTX W PT 45 MINUTES: CPT | Performed by: SOCIAL WORKER

## 2020-09-14 NOTE — PSYCH
Psychotherapy Provided: Individual Psychotherapy 45 minutes     Length of time in session: 45 minutes, follow up in 1 week    Goals addressed in session: Goal 1     Pain:      none    0    Current suicide risk : Low     Data: Steve Rodas states that he went to a [employer] event with some friends over the weekend  Although there was some anxiety, his friend was able to help adjust and start having fun  Discussed applying that lesson to stress with family and work  Completed some disability paperwork for him during this session as well    Assessment: Steve Rodas seems to making steady progress    Plan: see above for assigned homework  Stress management skills next [address]: Diagnosis and Treatment Plan explained to Huntley Goodpasture relates understanding diagnosis and is agreeable to Treatment Plan   Yes

## 2020-09-18 ENCOUNTER — TELEPHONE (OUTPATIENT)
Dept: FAMILY MEDICINE CLINIC | Facility: CLINIC | Age: 40
End: 2020-09-18

## 2020-09-18 DIAGNOSIS — I10 ESSENTIAL HYPERTENSION: ICD-10-CM

## 2020-09-18 RX ORDER — LISINOPRIL 20 MG/1
20 TABLET ORAL DAILY
Qty: 90 TABLET | Refills: 3 | Status: SHIPPED | OUTPATIENT
Start: 2020-09-18 | End: 2021-03-01 | Stop reason: SDUPTHER

## 2020-09-18 NOTE — TELEPHONE ENCOUNTER
----- Message from Chris Escoto sent at 9/18/2020  7:00 AM EDT -----  Regarding: FW: Prescription Question  Contact: [phone]    ----- Message -----  From: Lavell Vasquez  Sent: 9/18/2020   6:51 AM EDT  To: [facility] Clinical  Subject: Prescription Question                            Good morning  I was wondering if Shashi Lay could refill my Lisiniprol for me as I just took my last pill and it appears not fillable on [employer] website  I unfortunately had to [facility] all my monthly maintenance [facility] and was not permitted to use [facility] as my employer has a signed agreement I must use [facility]  I believe I may have sent a prior email  If I didn't, I am sorry  If you could, please send all my medications back to [facility] in [place] again  I apologize for all this  Thank you so much  Any questions I can be reached at [phone]  Also thank you all for the help again

## 2020-09-21 ENCOUNTER — SOCIAL WORK (OUTPATIENT)
Dept: BEHAVIORAL/MENTAL HEALTH CLINIC | Facility: CLINIC | Age: 40
End: 2020-09-21
Payer: COMMERCIAL

## 2020-09-21 DIAGNOSIS — F33.1 MODERATE EPISODE OF RECURRENT MAJOR DEPRESSIVE DISORDER (HCC): ICD-10-CM

## 2020-09-21 DIAGNOSIS — F43.10 PTSD (POST-TRAUMATIC STRESS DISORDER): Primary | ICD-10-CM

## 2020-09-21 PROCEDURE — 90834 PSYTX W PT 45 MINUTES: CPT | Performed by: SOCIAL WORKER
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
## 2020-09-23 ENCOUNTER — TELEPHONE (OUTPATIENT)
Dept: PSYCHIATRY | Facility: CLINIC | Age: 40
End: 2020-09-23

## 2020-09-28 ENCOUNTER — SOCIAL WORK (OUTPATIENT)
Dept: BEHAVIORAL/MENTAL HEALTH CLINIC | Facility: CLINIC | Age: 40
End: 2020-09-28
Payer: COMMERCIAL

## 2020-09-28 DIAGNOSIS — F43.10 PTSD (POST-TRAUMATIC STRESS DISORDER): Primary | ICD-10-CM

## 2020-09-28 DIAGNOSIS — F33.1 MODERATE EPISODE OF RECURRENT MAJOR DEPRESSIVE DISORDER (HCC): ICD-10-CM

## 2020-09-28 PROCEDURE — 90834 PSYTX W PT 45 MINUTES: CPT | Performed by: SOCIAL WORKER

## 2020-09-28 NOTE — PSYCH
Psychotherapy Provided: Individual Psychotherapy 45 minutes     Length of time in session: 45 minutes, follow up in 1 week    Goals addressed in session: Goal 1     Pain:      none    0    Current suicide risk : Low     Data: Robert De Guzman discussed concerns about his son, and the events of the week before last   They discussed it as a number of events that could be reframed  Discussed being present at crisis situations  Also discussed taking care of self, and concentrating on his actions rather than the actions of others  Assessment: Robert De Guzman seems to be making progress toward treatment goals    Plan: see above for assigned homework  Will be continuing services at my private practice      [address]: Diagnosis and Treatment Plan explained to Jil Mckeon relates understanding diagnosis and is agreeable to Treatment Plan   Yes

## 2020-10-01 DIAGNOSIS — I10 ESSENTIAL HYPERTENSION: ICD-10-CM

## 2020-10-01 DIAGNOSIS — M10.9 GOUT, UNSPECIFIED CAUSE, UNSPECIFIED CHRONICITY, UNSPECIFIED SITE: ICD-10-CM

## 2020-10-01 RX ORDER — ALLOPURINOL 100 MG/1
TABLET ORAL
Qty: 90 TABLET | Refills: 1 | Status: SHIPPED | OUTPATIENT
Start: 2020-10-01 | End: 2020-12-30 | Stop reason: SDUPTHER

## 2020-10-01 RX ORDER — AMLODIPINE BESYLATE 5 MG/1
5 TABLET ORAL DAILY
Qty: 90 TABLET | Refills: 1 | Status: SHIPPED | OUTPATIENT
Start: 2020-10-01 | End: 2021-03-01 | Stop reason: SDUPTHER

## 2020-10-14 ENCOUNTER — TELEPHONE (OUTPATIENT)
Dept: FAMILY MEDICINE CLINIC | Facility: CLINIC | Age: 40
End: 2020-10-14

## 2020-10-14 DIAGNOSIS — F41.9 ANXIETY: ICD-10-CM

## 2020-10-14 DIAGNOSIS — F43.10 PTSD (POST-TRAUMATIC STRESS DISORDER): ICD-10-CM

## 2020-10-14 RX ORDER — BUSPIRONE HYDROCHLORIDE 5 MG/1
5 TABLET ORAL 3 TIMES DAILY
Qty: 270 TABLET | Refills: 0 | Status: SHIPPED | OUTPATIENT
Start: 2020-10-14 | End: 2021-01-06

## 2020-10-14 RX ORDER — PAROXETINE HYDROCHLORIDE 25 MG/1
25 TABLET, FILM COATED, EXTENDED RELEASE ORAL EVERY MORNING
Qty: 90 TABLET | Refills: 1 | Status: SHIPPED | OUTPATIENT
Start: 2020-10-14 | End: 2021-04-23

## 2020-10-14 RX ORDER — PAROXETINE HYDROCHLORIDE 37.5 MG/1
37.5 TABLET, FILM COATED, EXTENDED RELEASE ORAL EVERY MORNING
Qty: 90 TABLET | Refills: 1 | Status: SHIPPED | OUTPATIENT
Start: 2020-10-14 | End: 2021-04-23

## 2020-12-21 ENCOUNTER — OFFICE VISIT (OUTPATIENT)
Dept: FAMILY MEDICINE CLINIC | Facility: CLINIC | Age: 40
End: 2020-12-21
Payer: COMMERCIAL

## 2020-12-21 VITALS
OXYGEN SATURATION: 98 % | SYSTOLIC BLOOD PRESSURE: 126 MMHG | TEMPERATURE: 98.6 F | WEIGHT: 248.8 LBS | HEART RATE: 82 BPM | HEIGHT: 67 IN | DIASTOLIC BLOOD PRESSURE: 90 MMHG | BODY MASS INDEX: 39.05 KG/M2

## 2020-12-21 DIAGNOSIS — Z13.9 SCREENING FOR UNSPECIFIED CONDITION: ICD-10-CM

## 2020-12-21 DIAGNOSIS — N52.9 ERECTILE DYSFUNCTION, UNSPECIFIED ERECTILE DYSFUNCTION TYPE: ICD-10-CM

## 2020-12-21 DIAGNOSIS — Z13.220 SCREENING FOR HYPERLIPIDEMIA: ICD-10-CM

## 2020-12-21 DIAGNOSIS — M25.50 ARTHRALGIA, UNSPECIFIED JOINT: ICD-10-CM

## 2020-12-21 DIAGNOSIS — I10 ESSENTIAL HYPERTENSION: ICD-10-CM

## 2020-12-21 DIAGNOSIS — M1A.9XX0 CHRONIC GOUT WITHOUT TOPHUS, UNSPECIFIED CAUSE, UNSPECIFIED SITE: Primary | ICD-10-CM

## 2020-12-21 PROCEDURE — 3725F SCREEN DEPRESSION PERFORMED: CPT | Performed by: PHYSICIAN ASSISTANT

## 2020-12-21 PROCEDURE — 1036F TOBACCO NON-USER: CPT | Performed by: PHYSICIAN ASSISTANT

## 2020-12-21 PROCEDURE — 3074F SYST BP LT 130 MM HG: CPT | Performed by: PHYSICIAN ASSISTANT

## 2020-12-21 PROCEDURE — 99214 OFFICE O/P EST MOD 30 MIN: CPT | Performed by: PHYSICIAN ASSISTANT

## 2020-12-21 PROCEDURE — 3080F DIAST BP >= 90 MM HG: CPT | Performed by: PHYSICIAN ASSISTANT

## 2020-12-21 PROCEDURE — 3008F BODY MASS INDEX DOCD: CPT | Performed by: PHYSICIAN ASSISTANT

## 2020-12-29 ENCOUNTER — LAB (OUTPATIENT)
Dept: LAB | Facility: MEDICAL CENTER | Age: 40
End: 2020-12-29
Payer: COMMERCIAL

## 2020-12-29 DIAGNOSIS — Z13.9 SCREENING FOR UNSPECIFIED CONDITION: ICD-10-CM

## 2020-12-29 DIAGNOSIS — Z13.220 SCREENING FOR HYPERLIPIDEMIA: ICD-10-CM

## 2020-12-29 DIAGNOSIS — M1A.9XX0 CHRONIC GOUT WITHOUT TOPHUS, UNSPECIFIED CAUSE, UNSPECIFIED SITE: ICD-10-CM

## 2020-12-29 DIAGNOSIS — M25.50 ARTHRALGIA, UNSPECIFIED JOINT: ICD-10-CM

## 2020-12-29 LAB
ALBUMIN SERPL BCP-MCNC: 4.4 G/DL (ref 3.5–5)
ALP SERPL-CCNC: 81 U/L (ref 46–116)
ALT SERPL W P-5'-P-CCNC: 38 U/L (ref 12–78)
ANION GAP SERPL CALCULATED.3IONS-SCNC: 6 MMOL/L (ref 4–13)
AST SERPL W P-5'-P-CCNC: 21 U/L (ref 5–45)
BASOPHILS # BLD AUTO: 0.05 THOUSANDS/ΜL (ref 0–0.1)
BASOPHILS NFR BLD AUTO: 1 % (ref 0–1)
BILIRUB SERPL-MCNC: 0.62 MG/DL (ref 0.2–1)
BUN SERPL-MCNC: 14 MG/DL (ref 5–25)
CALCIUM SERPL-MCNC: 10 MG/DL (ref 8.3–10.1)
CHLORIDE SERPL-SCNC: 105 MMOL/L (ref 100–108)
CHOLEST SERPL-MCNC: 217 MG/DL (ref 50–200)
CO2 SERPL-SCNC: 27 MMOL/L (ref 21–32)
CREAT SERPL-MCNC: 1.05 MG/DL (ref 0.6–1.3)
EOSINOPHIL # BLD AUTO: 0.08 THOUSAND/ΜL (ref 0–0.61)
EOSINOPHIL NFR BLD AUTO: 1 % (ref 0–6)
ERYTHROCYTE [DISTWIDTH] IN BLOOD BY AUTOMATED COUNT: 12.5 % (ref 11.6–15.1)
GFR SERPL CREATININE-BSD FRML MDRD: 89 ML/MIN/1.73SQ M
GLUCOSE P FAST SERPL-MCNC: 84 MG/DL (ref 65–99)
HCT VFR BLD AUTO: 45.4 % (ref 36.5–49.3)
HDLC SERPL-MCNC: 37 MG/DL
HGB BLD-MCNC: 15.5 G/DL (ref 12–17)
IMM GRANULOCYTES # BLD AUTO: 0.06 THOUSAND/UL (ref 0–0.2)
IMM GRANULOCYTES NFR BLD AUTO: 1 % (ref 0–2)
LDLC SERPL CALC-MCNC: 125 MG/DL (ref 0–100)
LYMPHOCYTES # BLD AUTO: 2.74 THOUSANDS/ΜL (ref 0.6–4.47)
LYMPHOCYTES NFR BLD AUTO: 32 % (ref 14–44)
MCH RBC QN AUTO: 29.6 PG (ref 26.8–34.3)
MCHC RBC AUTO-ENTMCNC: 34.1 G/DL (ref 31.4–37.4)
MCV RBC AUTO: 87 FL (ref 82–98)
MONOCYTES # BLD AUTO: 0.62 THOUSAND/ΜL (ref 0.17–1.22)
MONOCYTES NFR BLD AUTO: 7 % (ref 4–12)
NEUTROPHILS # BLD AUTO: 5.02 THOUSANDS/ΜL (ref 1.85–7.62)
NEUTS SEG NFR BLD AUTO: 58 % (ref 43–75)
NONHDLC SERPL-MCNC: 180 MG/DL
NRBC BLD AUTO-RTO: 0 /100 WBCS
PLATELET # BLD AUTO: 265 THOUSANDS/UL (ref 149–390)
PMV BLD AUTO: 9.9 FL (ref 8.9–12.7)
POTASSIUM SERPL-SCNC: 3.9 MMOL/L (ref 3.5–5.3)
PROT SERPL-MCNC: 7.9 G/DL (ref 6.4–8.2)
RBC # BLD AUTO: 5.24 MILLION/UL (ref 3.88–5.62)
SODIUM SERPL-SCNC: 138 MMOL/L (ref 136–145)
TRIGL SERPL-MCNC: 276 MG/DL
TSH SERPL DL<=0.05 MIU/L-ACNC: 2.59 UIU/ML (ref 0.36–3.74)
URATE SERPL-MCNC: 8.2 MG/DL (ref 4.2–8)
WBC # BLD AUTO: 8.57 THOUSAND/UL (ref 4.31–10.16)

## 2020-12-29 PROCEDURE — 80053 COMPREHEN METABOLIC PANEL: CPT

## 2020-12-29 PROCEDURE — 84443 ASSAY THYROID STIM HORMONE: CPT

## 2020-12-29 PROCEDURE — 84550 ASSAY OF BLOOD/URIC ACID: CPT

## 2020-12-29 PROCEDURE — 85025 COMPLETE CBC W/AUTO DIFF WBC: CPT

## 2020-12-29 PROCEDURE — 86618 LYME DISEASE ANTIBODY: CPT

## 2020-12-29 PROCEDURE — 36415 COLL VENOUS BLD VENIPUNCTURE: CPT

## 2020-12-29 PROCEDURE — 80061 LIPID PANEL: CPT

## 2020-12-30 DIAGNOSIS — M10.9 GOUT, UNSPECIFIED CAUSE, UNSPECIFIED CHRONICITY, UNSPECIFIED SITE: ICD-10-CM

## 2020-12-30 LAB — B BURGDOR IGG+IGM SER-ACNC: 55

## 2020-12-30 RX ORDER — ALLOPURINOL 100 MG/1
200 TABLET ORAL DAILY
Qty: 90 TABLET | Refills: 1
Start: 2020-12-30 | End: 2021-02-26 | Stop reason: SDUPTHER

## 2021-01-06 DIAGNOSIS — F41.9 ANXIETY: ICD-10-CM

## 2021-01-06 RX ORDER — BUSPIRONE HYDROCHLORIDE 5 MG/1
TABLET ORAL
Qty: 270 TABLET | Refills: 0 | Status: SHIPPED | OUTPATIENT
Start: 2021-01-06 | End: 2021-03-01 | Stop reason: SDUPTHER

## 2021-01-13 ENCOUNTER — TELEPHONE (OUTPATIENT)
Dept: FAMILY MEDICINE CLINIC | Facility: CLINIC | Age: 41
End: 2021-01-13

## 2021-01-13 NOTE — TELEPHONE ENCOUNTER
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

## 2021-02-09 ENCOUNTER — TELEPHONE (OUTPATIENT)
Dept: FAMILY MEDICINE CLINIC | Facility: CLINIC | Age: 41
End: 2021-02-09

## 2021-02-09 NOTE — TELEPHONE ENCOUNTER
----- Message from Rio Bhat sent at 2/9/2021  6:57 AM EST -----  Regarding: FW: Test Results Question  Contact: [phone]    ----- Message -----  From: Chet Myers  Sent: 2/8/2021   5:56 PM EST  To: [facility]  Subject: Test Results Question                            Tania De La Torre  I was curious if Aura Norton and myself, Chet Myers, could get a referral to be tested for Covid  I tested positive 2/3/21 but Tonio Coyne has yet to be tested  She has no symptoms at all  I have a lingering cough and still no taste/smell  Would this be something you or your staff could arrange? I was tested at the [facility] in [facility] last week  Please call or email me back and let me know how to proceed  Do I need to get retested?

## 2021-02-09 NOTE — TELEPHONE ENCOUNTER
Called patient does not need appt at this time is waiting for [facility] to call  If appt is needed he will call back

## 2021-02-26 ENCOUNTER — TELEPHONE (OUTPATIENT)
Dept: FAMILY MEDICINE CLINIC | Facility: CLINIC | Age: 41
End: 2021-02-26

## 2021-02-26 DIAGNOSIS — M10.9 GOUT, UNSPECIFIED CAUSE, UNSPECIFIED CHRONICITY, UNSPECIFIED SITE: ICD-10-CM

## 2021-02-26 RX ORDER — ALLOPURINOL 100 MG/1
200 TABLET ORAL DAILY
Qty: 180 TABLET | Refills: 1 | Status: SHIPPED | OUTPATIENT
Start: 2021-02-26 | End: 2021-03-19

## 2021-02-26 NOTE — TELEPHONE ENCOUNTER
----- Message from Desire Orlando sent at 2/26/2021  7:09 AM EST -----  Regarding: FW: Prescription Question  Contact: [phone]    ----- Message -----  From: Dulce Maria Olvera  Sent: 2/26/2021  12:42 AM EST  To: [facility] Clinical  Subject: Prescription Question                            Sorry for this really late/early email here  By any chance can I get a call in for the 200mg allopurinol Trinh prescribed? I used up all my 100mg now since increasing the dosage  [facility] [place]  Thank you ladies

## 2021-03-01 DIAGNOSIS — I10 ESSENTIAL HYPERTENSION: ICD-10-CM

## 2021-03-01 DIAGNOSIS — F41.9 ANXIETY: ICD-10-CM

## 2021-03-01 RX ORDER — BUSPIRONE HYDROCHLORIDE 5 MG/1
5 TABLET ORAL 3 TIMES DAILY
Qty: 270 TABLET | Refills: 0 | Status: SHIPPED | OUTPATIENT
Start: 2021-03-01 | End: 2021-07-26 | Stop reason: SDUPTHER

## 2021-03-01 RX ORDER — AMLODIPINE BESYLATE 5 MG/1
5 TABLET ORAL DAILY
Qty: 90 TABLET | Refills: 0 | Status: SHIPPED | OUTPATIENT
Start: 2021-03-01 | End: 2021-06-18

## 2021-03-01 RX ORDER — COLCHICINE 0.6 MG/1
TABLET ORAL
Qty: 90 TABLET | Refills: 1 | Status: SHIPPED | OUTPATIENT
Start: 2021-03-01 | End: 2021-07-26 | Stop reason: SDUPTHER

## 2021-03-01 RX ORDER — LISINOPRIL 20 MG/1
20 TABLET ORAL DAILY
Qty: 90 TABLET | Refills: 0 | Status: SHIPPED | OUTPATIENT
Start: 2021-03-01 | End: 2021-07-26 | Stop reason: SDUPTHER

## 2021-03-09 ENCOUNTER — TELEPHONE (OUTPATIENT)
Dept: FAMILY MEDICINE CLINIC | Facility: CLINIC | Age: 41
End: 2021-03-09

## 2021-03-09 NOTE — TELEPHONE ENCOUNTER
----- Message from Santhosh Jang DO sent at 3/8/2021  4:36 PM EST -----  Regarding: FW: Prescription Question  Contact: [phone]   Please forward to family  ----- Message -----  From: Paresh Christopher  Sent: 3/8/2021   6:56 AM EST  To: Santhosh Jang DO  Subject: FW: Prescription Question                          ----- Message -----  From: Jacob Hurd  Sent: 3/7/2021   9:20 AM EST  To: Steve [facility]omas [facility] Clinical  Subject: Prescription Question                            Hello  I was wondering if I could ask Bertha Rubalcava a quick question  My body aches and pretty sore its arthritis and years of not understanding my gout issues  I came across a daily supplement on [employer] called Turmeric Curcumin with Bioperine 1500mg natural joint and healthy inflammatory support with 95% standardized curcuminoids for absorption and potency  Gluten free capsules with black pepper  Curious if this would interact at all with my prescriptions Im on  Thank you ladies  Have a wonderful day and your care and support of your patients is hands down the best I ever had

## 2021-03-09 NOTE — TELEPHONE ENCOUNTER
Please notify patient of the following:    Supplements are not regulated by the FDA  We are not sure what exactly is in the supplements and if it will cause any interactions  There is not much research on these types of things, so I would recommend against it  Alternatives would be tylenol or motrin as needed to help with joint pains, ice,  exercise, healthy diet, and muscle rubs

## 2021-03-19 DIAGNOSIS — M10.9 GOUT, UNSPECIFIED CAUSE, UNSPECIFIED CHRONICITY, UNSPECIFIED SITE: ICD-10-CM

## 2021-03-19 RX ORDER — ALLOPURINOL 100 MG/1
200 TABLET ORAL DAILY
Qty: 180 TABLET | Refills: 1 | Status: SHIPPED | OUTPATIENT
Start: 2021-03-19 | End: 2021-11-22

## 2021-03-25 ENCOUNTER — TELEPHONE (OUTPATIENT)
Dept: FAMILY MEDICINE CLINIC | Facility: CLINIC | Age: 41
End: 2021-03-25

## 2021-03-25 NOTE — TELEPHONE ENCOUNTER
Please notify patient of the following: There is no contraindication to any of these vaccinations with the medications he is taking  Also, congrats on the new job!

## 2021-03-25 NOTE — TELEPHONE ENCOUNTER
----- Message from Manan Donis sent at 3/25/2021 12:12 PM EDT -----  Regarding: FW: Pre-Procedure Testing Question  Contact: [phone]    ----- Message -----  From: Maryanne Akbar  Sent: 3/25/2021  12:10 PM EDT  To: [facility]  Subject: Pre-Procedure Testing Question                   [place] and staff  I have recently started a new job after 15yrs at [employer]  Thank goodness no more 7 days a week  With that said I am working with [employer]  I have been given clearance to receive for free, county paid  (3 part) Hepatitis B vaccine and Tetanus vaccine  I would like to know if my laundry list of medications would interact negatively to these vaccines? If not I will schedule these then and get them  Thank you

## 2021-04-22 DIAGNOSIS — F43.10 PTSD (POST-TRAUMATIC STRESS DISORDER): ICD-10-CM

## 2021-04-22 DIAGNOSIS — F41.9 ANXIETY: ICD-10-CM

## 2021-04-23 RX ORDER — PAROXETINE HYDROCHLORIDE 37.5 MG/1
TABLET, FILM COATED, EXTENDED RELEASE ORAL
Qty: 90 TABLET | Refills: 1 | Status: SHIPPED | OUTPATIENT
Start: 2021-04-23 | End: 2021-09-16 | Stop reason: SDUPTHER

## 2021-04-23 RX ORDER — PAROXETINE HYDROCHLORIDE 25 MG/1
TABLET, FILM COATED, EXTENDED RELEASE ORAL
Qty: 90 TABLET | Refills: 1 | Status: SHIPPED | OUTPATIENT
Start: 2021-04-23 | End: 2021-09-16 | Stop reason: SDUPTHER

## 2021-05-21 ENCOUNTER — APPOINTMENT (OUTPATIENT)
Dept: RADIOLOGY | Facility: MEDICAL CENTER | Age: 41
End: 2021-05-21
Payer: COMMERCIAL

## 2021-05-21 ENCOUNTER — OFFICE VISIT (OUTPATIENT)
Dept: OBGYN CLINIC | Facility: CLINIC | Age: 41
End: 2021-05-21
Payer: COMMERCIAL

## 2021-05-21 VITALS
WEIGHT: 248 LBS | HEIGHT: 67 IN | HEART RATE: 89 BPM | DIASTOLIC BLOOD PRESSURE: 95 MMHG | BODY MASS INDEX: 38.92 KG/M2 | SYSTOLIC BLOOD PRESSURE: 137 MMHG

## 2021-05-21 DIAGNOSIS — M22.2X1 PATELLOFEMORAL DISORDER OF RIGHT KNEE: Primary | ICD-10-CM

## 2021-05-21 DIAGNOSIS — M22.2X1 PATELLOFEMORAL DISORDER OF RIGHT KNEE: ICD-10-CM

## 2021-05-21 PROCEDURE — 99213 OFFICE O/P EST LOW 20 MIN: CPT

## 2021-05-21 PROCEDURE — 20610 DRAIN/INJ JOINT/BURSA W/O US: CPT

## 2021-05-21 PROCEDURE — 73562 X-RAY EXAM OF KNEE 3: CPT

## 2021-05-21 RX ORDER — BETAMETHASONE SODIUM PHOSPHATE AND BETAMETHASONE ACETATE 3; 3 MG/ML; MG/ML
12 INJECTION, SUSPENSION INTRA-ARTICULAR; INTRALESIONAL; INTRAMUSCULAR; SOFT TISSUE
Status: COMPLETED | OUTPATIENT
Start: 2021-05-21 | End: 2021-05-21

## 2021-05-21 RX ORDER — BUPIVACAINE HYDROCHLORIDE 2.5 MG/ML
4 INJECTION, SOLUTION INFILTRATION; PERINEURAL
Status: COMPLETED | OUTPATIENT
Start: 2021-05-21 | End: 2021-05-21

## 2021-05-21 RX ADMIN — BUPIVACAINE HYDROCHLORIDE 4 ML: 2.5 INJECTION, SOLUTION INFILTRATION; PERINEURAL at 08:24

## 2021-05-21 RX ADMIN — BETAMETHASONE SODIUM PHOSPHATE AND BETAMETHASONE ACETATE 12 MG: 3; 3 INJECTION, SUSPENSION INTRA-ARTICULAR; INTRALESIONAL; INTRAMUSCULAR; SOFT TISSUE at 08:24
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
## 2021-06-28 ENCOUNTER — HOSPITAL ENCOUNTER (EMERGENCY)
Facility: HOSPITAL | Age: 41
Discharge: HOME | End: 2021-06-28
Attending: EMERGENCY MEDICINE
Payer: COMMERCIAL

## 2021-06-28 ENCOUNTER — APPOINTMENT (EMERGENCY)
Dept: RADIOLOGY | Facility: HOSPITAL | Age: 41
End: 2021-06-28
Payer: COMMERCIAL

## 2021-06-28 VITALS
SYSTOLIC BLOOD PRESSURE: 148 MMHG | TEMPERATURE: 97.7 F | HEIGHT: 67 IN | OXYGEN SATURATION: 98 % | HEART RATE: 86 BPM | BODY MASS INDEX: 40.97 KG/M2 | WEIGHT: 261.02 LBS | DIASTOLIC BLOOD PRESSURE: 65 MMHG | RESPIRATION RATE: 16 BRPM

## 2021-06-28 DIAGNOSIS — R06.4 HYPERVENTILATION: ICD-10-CM

## 2021-06-28 DIAGNOSIS — R07.9 CHEST PAIN: Primary | ICD-10-CM

## 2021-06-28 LAB
ALBUMIN SERPL BCP-MCNC: 4 G/DL (ref 3.5–5)
ALP SERPL-CCNC: 77 U/L (ref 46–116)
ALT SERPL W P-5'-P-CCNC: 46 U/L (ref 12–78)
ANION GAP SERPL CALCULATED.3IONS-SCNC: 10 MMOL/L (ref 4–13)
APTT PPP: 23 SECONDS (ref 23–37)
AST SERPL W P-5'-P-CCNC: 19 U/L (ref 5–45)
ATRIAL RATE: 122 BPM
BASOPHILS # BLD AUTO: 0.04 THOUSANDS/ΜL (ref 0–0.1)
BASOPHILS NFR BLD AUTO: 1 % (ref 0–1)
BILIRUB SERPL-MCNC: 0.52 MG/DL (ref 0.2–1)
BUN SERPL-MCNC: 12 MG/DL (ref 5–25)
CALCIUM SERPL-MCNC: 8.9 MG/DL (ref 8.3–10.1)
CHLORIDE SERPL-SCNC: 102 MMOL/L (ref 100–108)
CO2 SERPL-SCNC: 26 MMOL/L (ref 21–32)
CREAT SERPL-MCNC: 1.17 MG/DL (ref 0.6–1.3)
D DIMER PPP FEU-MCNC: 0.28 UG/ML FEU
EOSINOPHIL # BLD AUTO: 0.1 THOUSAND/ΜL (ref 0–0.61)
EOSINOPHIL NFR BLD AUTO: 1 % (ref 0–6)
ERYTHROCYTE [DISTWIDTH] IN BLOOD BY AUTOMATED COUNT: 12.2 % (ref 11.6–15.1)
GFR SERPL CREATININE-BSD FRML MDRD: 78 ML/MIN/1.73SQ M
GLUCOSE SERPL-MCNC: 111 MG/DL (ref 65–140)
HCT VFR BLD AUTO: 42.5 % (ref 36.5–49.3)
HGB BLD-MCNC: 14.9 G/DL (ref 12–17)
IMM GRANULOCYTES # BLD AUTO: 0.12 THOUSAND/UL (ref 0–0.2)
IMM GRANULOCYTES NFR BLD AUTO: 1 % (ref 0–2)
INR PPP: 0.95 (ref 0.84–1.19)
LYMPHOCYTES # BLD AUTO: 2.69 THOUSANDS/ΜL (ref 0.6–4.47)
LYMPHOCYTES NFR BLD AUTO: 32 % (ref 14–44)
MCH RBC QN AUTO: 29.9 PG (ref 26.8–34.3)
MCHC RBC AUTO-ENTMCNC: 35.1 G/DL (ref 31.4–37.4)
MCV RBC AUTO: 85 FL (ref 82–98)
MONOCYTES # BLD AUTO: 0.58 THOUSAND/ΜL (ref 0.17–1.22)
MONOCYTES NFR BLD AUTO: 7 % (ref 4–12)
NEUTROPHILS # BLD AUTO: 4.84 THOUSANDS/ΜL (ref 1.85–7.62)
NEUTS SEG NFR BLD AUTO: 58 % (ref 43–75)
NRBC BLD AUTO-RTO: 0 /100 WBCS
NT-PROBNP SERPL-MCNC: 21 PG/ML
P AXIS: 53 DEGREES
PLATELET # BLD AUTO: 250 THOUSANDS/UL (ref 149–390)
PMV BLD AUTO: 9.6 FL (ref 8.9–12.7)
POTASSIUM SERPL-SCNC: 3.6 MMOL/L (ref 3.5–5.3)
PR INTERVAL: 158 MS
PROT SERPL-MCNC: 7.3 G/DL (ref 6.4–8.2)
PROTHROMBIN TIME: 12.5 SECONDS (ref 11.6–14.5)
QRS AXIS: 40 DEGREES
QRSD INTERVAL: 90 MS
QT INTERVAL: 316 MS
QTC INTERVAL: 450 MS
RBC # BLD AUTO: 4.98 MILLION/UL (ref 3.88–5.62)
SODIUM SERPL-SCNC: 138 MMOL/L (ref 136–145)
T WAVE AXIS: 59 DEGREES
TROPONIN I SERPL-MCNC: <0.02 NG/ML
TROPONIN I SERPL-MCNC: <0.02 NG/ML
VENTRICULAR RATE: 122 BPM
WBC # BLD AUTO: 8.37 THOUSAND/UL (ref 4.31–10.16)

## 2021-06-28 PROCEDURE — 80053 COMPREHEN METABOLIC PANEL: CPT | Performed by: EMERGENCY MEDICINE

## 2021-06-28 PROCEDURE — 71045 X-RAY EXAM CHEST 1 VIEW: CPT

## 2021-06-28 PROCEDURE — 36415 COLL VENOUS BLD VENIPUNCTURE: CPT | Performed by: EMERGENCY MEDICINE

## 2021-06-28 PROCEDURE — 96374 THER/PROPH/DIAG INJ IV PUSH: CPT

## 2021-06-28 PROCEDURE — 85025 COMPLETE CBC W/AUTO DIFF WBC: CPT | Performed by: EMERGENCY MEDICINE

## 2021-06-28 PROCEDURE — 93005 ELECTROCARDIOGRAM TRACING: CPT

## 2021-06-28 PROCEDURE — 85730 THROMBOPLASTIN TIME PARTIAL: CPT | Performed by: EMERGENCY MEDICINE

## 2021-06-28 PROCEDURE — 85610 PROTHROMBIN TIME: CPT | Performed by: EMERGENCY MEDICINE

## 2021-06-28 PROCEDURE — 96361 HYDRATE IV INFUSION ADD-ON: CPT

## 2021-06-28 PROCEDURE — 85379 FIBRIN DEGRADATION QUANT: CPT | Performed by: EMERGENCY MEDICINE

## 2021-06-28 PROCEDURE — 99285 EMERGENCY DEPT VISIT HI MDM: CPT

## 2021-06-28 PROCEDURE — 84484 ASSAY OF TROPONIN QUANT: CPT | Performed by: EMERGENCY MEDICINE

## 2021-06-28 PROCEDURE — 83880 ASSAY OF NATRIURETIC PEPTIDE: CPT | Performed by: EMERGENCY MEDICINE

## 2021-06-28 PROCEDURE — 99285 EMERGENCY DEPT VISIT HI MDM: CPT | Performed by: EMERGENCY MEDICINE

## 2021-06-28 RX ORDER — LORAZEPAM 2 MG/ML
1 INJECTION INTRAMUSCULAR ONCE
Status: COMPLETED | OUTPATIENT
Start: 2021-06-28 | End: 2021-06-28

## 2021-06-28 RX ADMIN — SODIUM CHLORIDE 1000 ML: 0.9 INJECTION, SOLUTION INTRAVENOUS at 08:12

## 2021-06-28 RX ADMIN — LORAZEPAM 1 MG: 2 INJECTION INTRAMUSCULAR; INTRAVENOUS at 08:10

## 2021-06-28 NOTE — ED PROVIDER NOTES
History  Chief Complaint   Patient presents with    Chest Pain     started on drive to work about 1 hour ago, feels like a previous panic attack, feels as though chest felt tight and difficult to breath     Patient is a 45-year-old male with history of anxiety and hypertension who presents the emergency department complaining of not feeling well being very anxious having chest pain and shortness of breath similar to prior anxiety attacks however worse started about 1 hour prior to arrival   Patient reports he woke up feeling well this morning but symptoms started while he was driving to work  No recent illness  No history of heart disease  History provided by:  Patient  Chest Pain  Pain location:  Substernal area  Pain quality: tightness    Pain severity:  Mild  Onset quality:  Sudden  Duration:  1 hour  Timing:  Constant  Progression:  Worsening  Chronicity:  New  Context: stress    Associated symptoms: fatigue, palpitations and shortness of breath    Associated symptoms: no abdominal pain, no cough, no dizziness, no fever, no headache, no nausea, no numbness, not vomiting and no weakness        Prior to Admission Medications   Prescriptions Last Dose Informant Patient Reported? Taking?    PARoxetine (PAXIL-CR) 25 mg 24 hr tablet 2021 at Unknown time  No Yes   Sig: TAKE 1 TABLET BY MOUTH EVERY MORNING -TAKE WITH 37 5 MG TABLET FOR 62 5 MG TOTAL   PARoxetine (PAXIL-CR) 37 5 mg 24 hr tablet 2021 at Unknown time  No Yes   Sig: TAKE 1 TABLET BY MOUTH EVERY MORNING -TAKE WITH 25 MG TAB, TOTAL 62 5 TOTAL   allopurinol (ZYLOPRIM) 100 mg tablet 2021 at Unknown time  No Yes   Sig: Take 2 tablets (200 mg total) by mouth daily   amLODIPine (NORVASC) 5 mg tablet 2021 at Unknown time  No Yes   Sig: TAKE 1 TABLET BY MOUTH EVERY DAY   brimonidine (ALPHAGAN P) 0 15 % ophthalmic solution 2021 at Unknown time Self Yes Yes   Si drop 3 (three) times a day   busPIRone (BUSPAR) 5 mg tablet 2021 at Unknown time  No Yes   Sig: Take 1 tablet (5 mg total) by mouth 3 (three) times a day   colchicine (COLCRYS) 0 6 mg tablet 2021 at Unknown time  No Yes   Sig: TAKE 1 TABLET BY MOUTH EVERY DAY   dorzolamide (TRUSOPT) 2 % ophthalmic solution 2021 at Unknown time Self Yes Yes   Si drop 3 (three) times a day   lisinopril (ZESTRIL) 20 mg tablet 2021 at Unknown time  No Yes   Sig: Take 1 tablet (20 mg total) by mouth daily   prednisoLONE acetate (PRED FORTE) 1 % ophthalmic suspension 2021 at Unknown time Self Yes Yes   Si drop 4 (four) times a day      Facility-Administered Medications: None       Past Medical History:   Diagnosis Date    Blindness of left eye     Knee pain, right        Past Surgical History:   Procedure Laterality Date    EYE SURGERY      FL INJECTION RIGHT KNEE (ARTHROGRAM)  2019    HAND SURGERY      NECK SURGERY      WRIST SURGERY         Family History   Problem Relation Age of Onset    Diabetes Mother     Hypertension Mother     Anxiety disorder Mother     Depression Mother      I have reviewed and agree with the history as documented  E-Cigarette/Vaping    E-Cigarette Use Never User      E-Cigarette/Vaping Substances    Nicotine No     THC No     CBD No     Flavoring No     Other No     Unknown No      Social History     Tobacco Use    Smoking status: Never Smoker    Smokeless tobacco: Never Used   Vaping Use    Vaping Use: Never used   Substance Use Topics    Alcohol use: Never    Drug use: Never       Review of Systems   Constitutional: Positive for fatigue  Negative for activity change, appetite change, chills and fever  HENT: Negative for congestion, ear pain, rhinorrhea and sore throat  Eyes: Negative for discharge, redness and visual disturbance  Respiratory: Positive for chest tightness and shortness of breath  Negative for cough and wheezing  Cardiovascular: Positive for chest pain and palpitations     Gastrointestinal: Negative for abdominal pain, constipation, diarrhea, nausea and vomiting  Endocrine: Negative for polydipsia and polyuria  Genitourinary: Negative for difficulty urinating, dysuria, frequency, hematuria and urgency  Musculoskeletal: Negative for arthralgias and myalgias  Skin: Negative for color change, pallor and rash  Neurological: Positive for light-headedness  Negative for dizziness, weakness, numbness and headaches  Hematological: Negative for adenopathy  Does not bruise/bleed easily  All other systems reviewed and are negative  Physical Exam  Physical Exam  Vitals and nursing note reviewed  Constitutional:       Appearance: He is well-developed  HENT:      Head: Normocephalic and atraumatic  Right Ear: External ear normal       Left Ear: External ear normal       Nose: Nose normal    Eyes:      Conjunctiva/sclera: Conjunctivae normal       Pupils: Pupils are equal, round, and reactive to light  Cardiovascular:      Rate and Rhythm: Normal rate and regular rhythm  Heart sounds: Normal heart sounds  Pulmonary:      Effort: Pulmonary effort is normal  No respiratory distress  Breath sounds: Normal breath sounds  No wheezing or rales  Chest:      Chest wall: No tenderness  Abdominal:      General: Bowel sounds are normal  There is no distension  Palpations: Abdomen is soft  Tenderness: There is no abdominal tenderness  There is no guarding  Musculoskeletal:         General: Normal range of motion  Cervical back: Normal range of motion and neck supple  Skin:     General: Skin is warm and dry  Neurological:      Mental Status: He is alert and oriented to person, place, and time  Cranial Nerves: No cranial nerve deficit  Sensory: No sensory deficit           Vital Signs  ED Triage Vitals [06/28/21 0801]   Temperature Pulse Respirations Blood Pressure SpO2   97 7 °F (36 5 °C) (!) 123 18 124/78 99 %      Temp Source Heart Rate Source Patient Position - Orthostatic VS BP Location FiO2 (%)   Temporal Monitor Lying Right arm --      Pain Score       --           Vitals:    06/28/21 0915 06/28/21 0930 06/28/21 0945 06/28/21 1015   BP: 121/53 134/62 139/63 148/65   Pulse: 92 93 87 86   Patient Position - Orthostatic VS: Lying Lying Lying Lying         Visual Acuity      ED Medications  Medications   sodium chloride 0 9 % bolus 1,000 mL (0 mL Intravenous Stopped 6/28/21 0912)   LORazepam (ATIVAN) injection 1 mg (1 mg Intravenous Given 6/28/21 0810)       Diagnostic Studies  Results Reviewed     Procedure Component Value Units Date/Time    Troponin I [[id number]]  (Normal) Collected: 06/28/21 1044    Lab Status: Final result Specimen: Blood from Arm, Left Updated: 06/28/21 1105     Troponin I <0 02 ng/mL     NT-BNP PRO [[id number]]  (Normal) Collected: 06/28/21 0805    Lab Status: Final result Specimen: Blood from Arm, Left Updated: 06/28/21 0846     NT-proBNP 21 pg/mL     Troponin I repeat in 3hrs [[id number]]  (Normal) Collected: 06/28/21 0805    Lab Status: Final result Specimen: Blood Updated: 06/28/21 0831     Troponin I <0 02 ng/mL     D-Dimer [[id number]]  (Normal) Collected: 06/28/21 0805    Lab Status: Final result Specimen: Blood from Arm, Left Updated: 06/28/21 0829     D-Dimer, Quant 0 28 ug/ml FEU     Comprehensive metabolic panel [[id number]] Collected: 06/28/21 0805    Lab Status: Final result Specimen: Blood from Arm, Left Updated: 06/28/21 0827     Sodium 138 mmol/L      Potassium 3 6 mmol/L      Chloride 102 mmol/L      CO2 26 mmol/L      ANION GAP 10 mmol/L      BUN 12 mg/dL      Creatinine 1 17 mg/dL      Glucose 111 mg/dL      Calcium 8 9 mg/dL      AST 19 U/L      ALT 46 U/L      Alkaline Phosphatase 77 U/L      Total Protein 7 3 g/dL      Albumin 4 0 g/dL      Total Bilirubin 0 52 mg/dL      eGFR 78 ml/min/1 73sq m     Narrative:      [place] guidelines for Chronic Kidney Disease (CKD):     Stage 1 with normal or high GFR (GFR > 90 mL/min/1 73 square meters)    Stage 2 Mild CKD (GFR = 60-89 mL/min/1 73 square meters)    Stage 3A Moderate CKD (GFR = 45-59 mL/min/1 73 square meters)    Stage 3B Moderate CKD (GFR = 30-44 mL/min/1 73 square meters)    Stage 4 Severe CKD (GFR = 15-29 mL/min/1 73 square meters)    Stage 5 End Stage CKD (GFR <15 mL/min/1 73 square meters)  Note: GFR calculation is accurate only with a steady state creatinine    Protime-INR [[id number]]  (Normal) Collected: 06/28/21 0805    Lab Status: Final result Specimen: Blood from Arm, Left Updated: 06/28/21 0827     Protime 12 5 seconds      INR 0 95    APTT [[id number]]  (Normal) Collected: 06/28/21 0805    Lab Status: Final result Specimen: Blood from Arm, Left Updated: 06/28/21 0827     PTT 23 seconds     CBC and differential [[id number]] Collected: 06/28/21 0805    Lab Status: Final result Specimen: Blood from Arm, Left Updated: 06/28/21 0812     WBC 8 37 Thousand/uL      RBC 4 98 Million/uL      Hemoglobin 14 9 g/dL      Hematocrit 42 5 %      MCV 85 fL      MCH 29 9 pg      MCHC 35 1 g/dL      RDW 12 2 %      MPV 9 6 fL      Platelets 102 Thousands/uL      nRBC 0 /100 WBCs      Neutrophils Relative 58 %      Immat GRANS % 1 %      Lymphocytes Relative 32 %      Monocytes Relative 7 %      Eosinophils Relative 1 %      Basophils Relative 1 %      Neutrophils Absolute 4 84 Thousands/µL      Immature Grans Absolute 0 12 Thousand/uL      Lymphocytes Absolute 2 69 Thousands/µL      Monocytes Absolute 0 58 Thousand/µL      Eosinophils Absolute 0 10 Thousand/µL      Basophils Absolute 0 04 Thousands/µL                  XR chest 1 view portable   Final Result by Sunny Austin MD (06/28 5620)      No acute cardiopulmonary disease  Workstation performed: [id number]                    Procedures  ECG 12 Lead Documentation Only    Date/Time: 6/28/2021 8:02 AM  Performed by: Papa Contreras DO  Authorized by:  Papa Contreras DO     ECG reviewed by me, the ED Provider: yes    Patient location:  ED  Previous ECG:     Comparison to cardiac monitor: Yes    Quality:     Tracing quality:  Limited by artifact  Rate:     ECG rate:  122    ECG rate assessment: tachycardic    Rhythm:     Rhythm: sinus tachycardia    QRS:     QRS axis:  Normal    QRS intervals:  Normal  Conduction:     Conduction: normal    ST segments:     ST segments:  Normal  T waves:     T waves: non-specific               ED Course  ED Course as of Jun 28 1124   Mon Jun 28, 2021   0914 Workup in ED is unremarkable as far patient remains clinically and hemodynamically stable symptoms improved with rest fluids and Ativan now neurologic examination is normal nonfocal symptoms most consistent with acute anxiety and hyperventilation  Heart score is 2 patient is very low risk for major adverse cardiac event patient is wells low risk perc positive for heart rate greater than 100 and arrival which is now reduced D-dimer is negative no further workup indicated for pulmonary embolism rule out  HEART Risk Score      Most Recent Value   Heart Score Risk Calculator   History  0 Filed at: 06/28/2021 0806   ECG  1 Filed at: 06/28/2021 1539   Age  0 Filed at: 06/28/2021 6918   Risk Factors  1 Filed at: 06/28/2021 0806   Troponin  0 Filed at: 06/28/2021 4195   HEART Score  2 Filed at: 06/28/2021 8240                      SBIRT 20yo+      Most Recent Value   SBIRT (25 yo +)   In order to provide better care to our patients, we are screening all of our patients for alcohol and drug use  Would it be okay to ask you these screening questions? Yes Filed at: 06/28/2021 7579   Initial Alcohol Screen: US AUDIT-C    1  How often do you have a drink containing alcohol?  0 Filed at: 06/28/2021 0817   2  How many drinks containing alcohol do you have on a typical day you are drinking? 0 Filed at: 06/28/2021 0817   3a  Male UNDER 65:  How often do you have five or more drinks on one occasion?  0 Filed at: 06/28/2021 0817   3b  FEMALE Any Age, or MALE 65+: How often do you have 4 or more drinks on one occassion? 0 Filed at: 06/28/2021 0817   Audit-C Score  0 Filed at: 06/28/2021 3877   RODY: How many times in the past year have you    Used an illegal drug or used a prescription medication for non-medical reasons? Never Filed at: 06/28/2021 1563                    MDM  Number of Diagnoses or Management Options  Chest pain: new and requires workup  Hyperventilation: new and requires workup  Diagnosis management comments: Patient remained clinically and hemodynamically stable throughout observation monitoring in the ED workup in ED is unremarkable reveals no evidence acute cardiopulmonary pathology suspect symptoms secondary to acute anxiety  Heart score is 2 patient is very low risk for major adverse cardiac event patient is also wells low risk perc positive for initial tachycardia D-dimer negative no further workup indicated for embolism rule out  Patient felt well after rest fluids and Ativan and remained stable advised prompt follow-up with PCP for further evaluation and treatment and obtain test results return precautions and anticipatory guidance discussed           Amount and/or Complexity of Data Reviewed  Clinical lab tests: reviewed and ordered  Tests in the radiology section of CPT®: ordered and reviewed  Tests in the medicine section of CPT®: ordered and reviewed  Decide to obtain previous medical records or to obtain history from someone other than the patient: yes  Review and summarize past medical records: yes  Independent visualization of images, tracings, or specimens: yes    Risk of Complications, Morbidity, and/or Mortality  Presenting problems: moderate  Diagnostic procedures: moderate  Management options: moderate    Patient Progress  Patient progress: stable      Disposition  Final diagnoses:   Chest pain   Hyperventilation     Time reflects when diagnosis was documented in both MDM as applicable and the Disposition within this note     Time User Action Codes Description Comment    6/28/2021  9:19 AM Schwarznandini Vale Add [R07 9] Chest pain     6/28/2021  9:20 AM Chong Vale Add [R06 4] Hyperventilation       ED Disposition     ED Disposition Condition Date/Time Comment    Discharge Stable Mon Jun 28, 2021 11:23 AM Ladon Boxer discharge to home/self care  Follow-up Information     Follow up With Specialties Details Why [address] Villafana PA-C Family Medicine, Physician Assistant Schedule an appointment as soon as possible for a visit in 2 days  [address] [address]  [facility] [address] [facility] [address] [phone]            Patient's Medications   Discharge Prescriptions    No medications on file     No discharge procedures on file      PDMP Review     None          ED Provider  Electronically Signed by           Bora Corrales DO  06/28/21 3858

## 2021-06-29 ENCOUNTER — OFFICE VISIT (OUTPATIENT)
Dept: FAMILY MEDICINE CLINIC | Facility: CLINIC | Age: 41
End: 2021-06-29
Payer: COMMERCIAL

## 2021-06-29 ENCOUNTER — TELEPHONE (OUTPATIENT)
Dept: FAMILY MEDICINE CLINIC | Facility: CLINIC | Age: 41
End: 2021-06-29

## 2021-06-29 VITALS
HEIGHT: 67 IN | BODY MASS INDEX: 39.65 KG/M2 | SYSTOLIC BLOOD PRESSURE: 168 MMHG | TEMPERATURE: 97.9 F | OXYGEN SATURATION: 98 % | DIASTOLIC BLOOD PRESSURE: 102 MMHG | WEIGHT: 252.6 LBS | HEART RATE: 68 BPM

## 2021-06-29 DIAGNOSIS — F41.0 PANIC ATTACK: ICD-10-CM

## 2021-06-29 DIAGNOSIS — R06.4 ACUTE HYPERVENTILATION: ICD-10-CM

## 2021-06-29 DIAGNOSIS — F41.9 ANXIETY: ICD-10-CM

## 2021-06-29 DIAGNOSIS — I10 ESSENTIAL HYPERTENSION: ICD-10-CM

## 2021-06-29 DIAGNOSIS — Z13.220 SCREENING FOR HYPERLIPIDEMIA: ICD-10-CM

## 2021-06-29 DIAGNOSIS — R61 EXCESSIVE SWEATING: ICD-10-CM

## 2021-06-29 DIAGNOSIS — F43.10 PTSD (POST-TRAUMATIC STRESS DISORDER): Primary | ICD-10-CM

## 2021-06-29 PROCEDURE — 99214 OFFICE O/P EST MOD 30 MIN: CPT | Performed by: PHYSICIAN ASSISTANT

## 2021-06-29 RX ORDER — ALPRAZOLAM 0.25 MG/1
0.25 TABLET ORAL DAILY PRN
Qty: 10 TABLET | Refills: 0 | Status: SHIPPED | OUTPATIENT
Start: 2021-06-29 | End: 2022-07-18 | Stop reason: ALTCHOICE

## 2021-06-29 NOTE — TELEPHONE ENCOUNTER
Patient wants to know if it is ok that he takes his medications before his stress test? He scheduled it for tomorrow morning, they told him to call us to verify this

## 2021-06-29 NOTE — LETTER
June 29, 2021     Patient: Cris Turner   YOB: 1980   Date of Visit: 6/29/2021       To Whom it May Concern:    Cris Turner is under my professional care  He was seen in my office on 6/29/2021  He may return to work on 7/5/2021  If you have any questions or concerns, please don't hesitate to call           Sincerely,            Lannis Kanner, PA-C

## 2021-06-29 NOTE — PROGRESS NOTES
Assessment/Plan:    Problem List Items Addressed This Visit        Cardiovascular and Mediastinum    Essential hypertension    Relevant Orders    Stress test only, exercise    Comprehensive metabolic panel    TSH, 3rd generation with Free T4 reflex       Other    PTSD (post-traumatic stress disorder) - Primary    Relevant Medications    ALPRAZolam (XANAX) 0 25 mg tablet    Other Relevant Orders    Comprehensive metabolic panel    TSH, 3rd generation with Free T4 reflex    Anxiety    Relevant Medications    ALPRAZolam (XANAX) 0 25 mg tablet    Other Relevant Orders    Comprehensive metabolic panel    TSH, 3rd generation with Free T4 reflex      Other Visit Diagnoses     Panic attack        Relevant Medications    ALPRAZolam (XANAX) 0 25 mg tablet    Other Relevant Orders    Stress test only, exercise    Comprehensive metabolic panel    TSH, 3rd generation with Free T4 reflex    Excessive sweating        Relevant Orders    Stress test only, exercise    Comprehensive metabolic panel    TSH, 3rd generation with Free T4 reflex    Acute hyperventilation        Relevant Orders    Stress test only, exercise    Comprehensive metabolic panel    TSH, 3rd generation with Free T4 reflex    Screening for hyperlipidemia        Relevant Orders    Lipid panel           Diagnoses and all orders for this visit:    PTSD (post-traumatic stress disorder)  -     Comprehensive metabolic panel; Future  -     TSH, 3rd generation with Free T4 reflex; Future    Anxiety  -     Comprehensive metabolic panel; Future  -     TSH, 3rd generation with Free T4 reflex; Future  -     ALPRAZolam (XANAX) 0 25 mg tablet; Take 1 tablet (0 25 mg total) by mouth daily as needed for anxiety    Panic attack  -     Stress test only, exercise; Future  -     Comprehensive metabolic panel; Future  -     TSH, 3rd generation with Free T4 reflex; Future  -     ALPRAZolam (XANAX) 0 25 mg tablet;  Take 1 tablet (0 25 mg total) by mouth daily as needed for anxiety    Excessive sweating  -     Stress test only, exercise; Future  -     Comprehensive metabolic panel; Future  -     TSH, 3rd generation with Free T4 reflex; Future    Essential hypertension  -     Stress test only, exercise; Future  -     Comprehensive metabolic panel; Future  -     TSH, 3rd generation with Free T4 reflex; Future    Acute hyperventilation  -     Stress test only, exercise; Future  -     Comprehensive metabolic panel; Future  -     TSH, 3rd generation with Free T4 reflex; Future    Screening for hyperlipidemia  -     Lipid panel; Future        Will get an exercise stress test to complete cardiac workup  BP elevated today  Patient is anxious  Numbers did improve to 124/78 while in the ER  I asked him to check his numbers at home and call us in 1-2 weeks with readings  I do believe that his symptoms were caused by an acute anxiety attack  I did write him a script for xanax to have on hand to take only if needed for an anxiety attack  Will also check TSH and repeat a fasting CMP and Lipid panel  Subjective:      Patient ID: Shira Lovell is a 37 y o  male  Brad Lafleur is a 37year old male who is here today for a follow-up from the ER  He has a history of generalized anxiety disorder, PTSD, and panic disorder that have been very well controlled on Paxil for many years  He was following with a psychiatrist, but he was stable so we continued to prescribe his Paxil  He was seeing a counselor that helped him, however, they felt he was doing well and did not have to continue with sessions  He started a new job about one month ago, which has been going really well for him  He has not had any major life stressors either  He was driving to work yesterday and started to feel very hot, sweaty, started hyperventilating, and his arms and face went numb  He pulled over at a friend's house and had him call the ambulance  He was taken to [facility] in [facility]   They completed a full cardiac workup and labs, which were all normal  He was given a dose of ativan and felt better  They felt that this was a result of his anxiety but recommended follow-up with PCP  He has not had another episode since being discharged, but is worried  He has been on the same medications  He has not been checking his blood pressures at home  He denies any shortness of breath or chest pains with exertion  He does note that he has been excessively sweaty recently  The following portions of the patient's history were reviewed and updated as appropriate:   He has a past medical history of Blindness of left eye and Knee pain, right ,  does not have any pertinent problems on file  ,   has a past surgical history that includes Eye surgery; Neck surgery; Wrist surgery; Hand surgery; and FL injection right knee (arthrogram) (5/21/2019)  ,  family history includes Anxiety disorder in his mother; Depression in his mother; Diabetes in his mother; Hypertension in his mother  ,   reports that he has never smoked  He has never used smokeless tobacco  He reports that he does not drink alcohol and does not use drugs  ,  is allergic to amoxil [amoxicillin], ciprofloxacin, and penicillins     Current Outpatient Medications   Medication Sig Dispense Refill    allopurinol (ZYLOPRIM) 100 mg tablet Take 2 tablets (200 mg total) by mouth daily 180 tablet 1    amLODIPine (NORVASC) 5 mg tablet TAKE 1 TABLET BY MOUTH EVERY DAY 90 tablet 1    brimonidine (ALPHAGAN P) 0 15 % ophthalmic solution 1 drop 3 (three) times a day      busPIRone (BUSPAR) 5 mg tablet Take 1 tablet (5 mg total) by mouth 3 (three) times a day 270 tablet 0    colchicine (COLCRYS) 0 6 mg tablet TAKE 1 TABLET BY MOUTH EVERY DAY 90 tablet 1    dorzolamide (TRUSOPT) 2 % ophthalmic solution 1 drop 3 (three) times a day      lisinopril (ZESTRIL) 20 mg tablet Take 1 tablet (20 mg total) by mouth daily 90 tablet 0    PARoxetine (PAXIL-CR) 25 mg 24 hr tablet TAKE 1 TABLET BY MOUTH EVERY MORNING -TAKE WITH 37 5 MG TABLET FOR 62 5 MG TOTAL 90 tablet 1    PARoxetine (PAXIL-CR) 37 5 mg 24 hr tablet TAKE 1 TABLET BY MOUTH EVERY MORNING -TAKE WITH 25 MG TAB, TOTAL 62 5 TOTAL 90 tablet 1    prednisoLONE acetate (PRED FORTE) 1 % ophthalmic suspension 1 drop 4 (four) times a day      ALPRAZolam (XANAX) 0 25 mg tablet Take 1 tablet (0 25 mg total) by mouth daily as needed for anxiety 10 tablet 0     No current facility-administered medications for this visit  Review of Systems   Constitutional: Positive for diaphoresis  Negative for chills, fatigue and fever  HENT: Negative for congestion, ear pain, postnasal drip, rhinorrhea, sneezing, sore throat and trouble swallowing  Eyes: Negative for pain and visual disturbance  Respiratory: Negative for apnea, cough, shortness of breath and wheezing  Cardiovascular: Negative for chest pain and palpitations  Gastrointestinal: Negative for abdominal pain, constipation, diarrhea, nausea and vomiting  Genitourinary: Negative for dysuria and hematuria  Musculoskeletal: Negative for arthralgias, gait problem and myalgias  Neurological: Negative for dizziness, syncope, weakness, light-headedness, numbness and headaches  Psychiatric/Behavioral: Negative for suicidal ideas  The patient is not nervous/anxious  Objective:  Vitals:    06/29/21 1128   BP: (!) 168/102   Pulse: 68   Temp: 97 9 °F (36 6 °C)   SpO2: 98%   Weight: 115 kg (252 lb 9 6 oz)   Height: 5' 7" (1 702 m)     Body mass index is 39 56 kg/m²  Physical Exam  Vitals and nursing note reviewed  Constitutional:       Appearance: He is well-developed  HENT:      Head: Normocephalic and atraumatic  Right Ear: Tympanic membrane, ear canal and external ear normal       Left Ear: Tympanic membrane, ear canal and external ear normal       Nose: Nose normal       Mouth/Throat:      Pharynx: No oropharyngeal exudate or posterior oropharyngeal erythema     Eyes: Extraocular Movements: Extraocular movements intact  Cardiovascular:      Rate and Rhythm: Normal rate and regular rhythm  Heart sounds: Normal heart sounds  No murmur heard  No friction rub  No gallop  Pulmonary:      Effort: Pulmonary effort is normal  No respiratory distress  Breath sounds: Normal breath sounds  No wheezing or rales  Musculoskeletal:         General: Normal range of motion  Cervical back: Normal range of motion and neck supple  Lymphadenopathy:      Cervical: No cervical adenopathy  Skin:     General: Skin is warm and dry  Neurological:      Mental Status: He is alert and oriented to person, place, and time  Psychiatric:         Mood and Affect: Mood is anxious  Mood is not depressed  Behavior: Behavior normal          Thought Content: Thought content normal  Thought content does not include homicidal or suicidal ideation  Thought content does not include homicidal or suicidal plan  Judgment: Judgment normal          BMI Counseling: Body mass index is 39 56 kg/m²  The BMI is above normal  Nutrition recommendations include reducing portion sizes and decreasing overall calorie intake  Exercise recommendations include exercising 3-5 times per week

## 2021-06-30 ENCOUNTER — HOSPITAL ENCOUNTER (OUTPATIENT)
Dept: NON INVASIVE DIAGNOSTICS | Facility: HOSPITAL | Age: 41
Discharge: HOME | End: 2021-06-30
Payer: COMMERCIAL

## 2021-06-30 ENCOUNTER — LAB (OUTPATIENT)
Dept: LAB | Facility: CLINIC | Age: 41
End: 2021-06-30
Payer: COMMERCIAL

## 2021-06-30 DIAGNOSIS — I10 ESSENTIAL HYPERTENSION: ICD-10-CM

## 2021-06-30 DIAGNOSIS — F43.10 PTSD (POST-TRAUMATIC STRESS DISORDER): ICD-10-CM

## 2021-06-30 DIAGNOSIS — Z13.220 SCREENING FOR HYPERLIPIDEMIA: ICD-10-CM

## 2021-06-30 DIAGNOSIS — F41.0 PANIC ATTACK: ICD-10-CM

## 2021-06-30 DIAGNOSIS — R06.4 ACUTE HYPERVENTILATION: ICD-10-CM

## 2021-06-30 DIAGNOSIS — R61 EXCESSIVE SWEATING: ICD-10-CM

## 2021-06-30 DIAGNOSIS — F41.9 ANXIETY: ICD-10-CM

## 2021-06-30 LAB
ALBUMIN SERPL BCP-MCNC: 4.3 G/DL (ref 3.5–5)
ALP SERPL-CCNC: 78 U/L (ref 46–116)
ALT SERPL W P-5'-P-CCNC: 54 U/L (ref 12–78)
ANION GAP SERPL CALCULATED.3IONS-SCNC: 5 MMOL/L (ref 4–13)
AST SERPL W P-5'-P-CCNC: 30 U/L (ref 5–45)
BILIRUB SERPL-MCNC: 0.52 MG/DL (ref 0.2–1)
BUN SERPL-MCNC: 18 MG/DL (ref 5–25)
CALCIUM SERPL-MCNC: 9.5 MG/DL (ref 8.3–10.1)
CHLORIDE SERPL-SCNC: 104 MMOL/L (ref 100–108)
CHOLEST SERPL-MCNC: 204 MG/DL (ref 50–200)
CO2 SERPL-SCNC: 27 MMOL/L (ref 21–32)
CREAT SERPL-MCNC: 1.07 MG/DL (ref 0.6–1.3)
GFR SERPL CREATININE-BSD FRML MDRD: 86 ML/MIN/1.73SQ M
GLUCOSE P FAST SERPL-MCNC: 86 MG/DL (ref 65–99)
HDLC SERPL-MCNC: 42 MG/DL
LDLC SERPL CALC-MCNC: 100 MG/DL (ref 0–100)
NONHDLC SERPL-MCNC: 162 MG/DL
POTASSIUM SERPL-SCNC: 4.3 MMOL/L (ref 3.5–5.3)
PROT SERPL-MCNC: 7.8 G/DL (ref 6.4–8.2)
SODIUM SERPL-SCNC: 136 MMOL/L (ref 136–145)
TRIGL SERPL-MCNC: 308 MG/DL
TSH SERPL DL<=0.05 MIU/L-ACNC: 3.28 UIU/ML (ref 0.36–3.74)

## 2021-06-30 PROCEDURE — 93016 CV STRESS TEST SUPVJ ONLY: CPT | Performed by: INTERNAL MEDICINE

## 2021-06-30 PROCEDURE — 36415 COLL VENOUS BLD VENIPUNCTURE: CPT

## 2021-06-30 PROCEDURE — 93017 CV STRESS TEST TRACING ONLY: CPT

## 2021-06-30 PROCEDURE — 80053 COMPREHEN METABOLIC PANEL: CPT

## 2021-06-30 PROCEDURE — 80061 LIPID PANEL: CPT

## 2021-06-30 PROCEDURE — 93018 CV STRESS TEST I&R ONLY: CPT | Performed by: INTERNAL MEDICINE

## 2021-06-30 PROCEDURE — 84443 ASSAY THYROID STIM HORMONE: CPT

## 2021-07-01 ENCOUNTER — TELEPHONE (OUTPATIENT)
Dept: FAMILY MEDICINE CLINIC | Facility: CLINIC | Age: 41
End: 2021-07-01

## 2021-07-01 DIAGNOSIS — E78.5 HYPERLIPIDEMIA, UNSPECIFIED HYPERLIPIDEMIA TYPE: Primary | ICD-10-CM

## 2021-07-01 RX ORDER — ATORVASTATIN CALCIUM 10 MG/1
10 TABLET, FILM COATED ORAL DAILY
Qty: 90 TABLET | Refills: 1 | Status: SHIPPED | OUTPATIENT
Start: 2021-07-01 | End: 2022-01-11

## 2021-07-02 LAB
ARRHY DURING EX: NORMAL
CHEST PAIN STATEMENT: NORMAL
MAX DIASTOLIC BP: 100 MMHG
MAX HEART RATE: 164 BPM
MAX PREDICTED HEART RATE: 180 BPM
MAX. SYSTOLIC BP: 192 MMHG
PROTOCOL NAME: NORMAL
REASON FOR TERMINATION: NORMAL
TARGET HR FORMULA: NORMAL
TEST INDICATION: NORMAL
TIME IN EXERCISE PHASE: NORMAL

## 2021-07-06 ENCOUNTER — TELEPHONE (OUTPATIENT)
Dept: FAMILY MEDICINE CLINIC | Facility: CLINIC | Age: 41
End: 2021-07-06

## 2021-07-06 NOTE — TELEPHONE ENCOUNTER
----- Message from Linette Stan sent at 7/6/2021  7:51 AM EDT -----  Regarding: FW: Visit Follow-Up Question  Contact: [phone]    ----- Message -----  From: Basilio Antoine  Sent: 7/6/2021   5:10 AM EDT  To: [facility]  Subject: Visit Follow-Up Question                         Josué Teran and Geoffrey Nelson  Im sorry to bother either of you here  I am set to return to work today, 7/6/21 and I have been having anxiety attack after anxiety attack all weekend  Last week was scary as hell with what happened to me and for no rhyme nor reason  Im not sure if my PTSD is just beating me up or my anxiety disorder  I found myself previously able to cope and overcome, but I have to get myself to that point  Its weird to say, I get that  Is there any possibility that Geoffrey Nelson would see me and help me in a safe return to work date of next week sometime? I cringe when I get like this but yet Im the past if I mentally process things, dont pay around and get out and about I tend to get back myself  My familys insurance currently sucks till September 4 as well  Thank you for any help here, Id really appreciate it  Im sorry

## 2021-07-06 NOTE — TELEPHONE ENCOUNTER
----- Message from Higiniojorje Seamandean sent at 7/6/2021  3:07 PM EDT -----  Regarding: FW:Micaela's reply  Contact: [phone]    ----- Message -----  From: Basilio Antoine  Sent: 7/6/2021   2:59 PM EDT  To: [address] [facility]  Subject: RE:Micaela's reply                                 I just spoke with [facility] [employer]  They are my employer and they should be reaching out I assume in regards to documentation from you in regards to my current illness Im suffering through here  I been openly communicating with my employer and manager and I guess the manager has been forwarding all my personal messages with him to  which I wasnt aware of so Im glad I didnt confide in him to personally  Very poor ethics in the workplace if you ask me, but anyways  What paperwork they need from you you can fill out and return to them and I will pay you ladies  I dont have any STD or FMLA so Im not to sure as to what this is for or why it is needed  I thank you all so much and now the gut churning begins again  [facility] [place]  Thank you all so much for always being there for me

## 2021-07-06 NOTE — TELEPHONE ENCOUNTER
I can extend his back to work date if that is what he needs  I do not need to see him in the office since he was just here  We can email him a new note  Is he thinking of returning next Monday 7/12/21? I really think he needs to get in with counseling and/or psychaitart to help him with the PTSD and severe anxiety  He needs to call his insurance to see where he can go  Let me know if there is anything else I can do to help

## 2021-07-06 NOTE — TELEPHONE ENCOUNTER
----- Message from Silvia Richards sent at 7/6/2021 11:48 AM EDT -----  Regarding: FW:Micaela's reply  Contact: [phone]    ----- Message -----  From: Renetta Chi  Sent: 7/6/2021  11:45 AM EDT  To: [facility]  Subject: RE:Micaela's reply                                 The date of July 12th would be great  I do have to see Dr Jorge Luis Vasques but I dont have the funds to do so 2x a week currently and the insurance I purchased for my family and I from now till September 4 is garbage  I am so sorry  Thank you all for the extended help here and a letter for July 12th return and no work restrictions I guess

## 2021-07-06 NOTE — TELEPHONE ENCOUNTER
I put a new note in his chart  He can either print it off of [facility] or email it to him  Please let me know if there is anything else I can do to help

## 2021-07-08 ENCOUNTER — TELEPHONE (OUTPATIENT)
Dept: FAMILY MEDICINE CLINIC | Facility: CLINIC | Age: 41
End: 2021-07-08

## 2021-07-08 NOTE — TELEPHONE ENCOUNTER
----- Message from Lindsay Maravilla sent at 7/8/2021  3:27 PM EDT -----  Regarding: FW: Non-Urgent Medical Question  Contact: [phone]    ----- Message -----  From: Denisha Schmid  Sent: 7/8/2021   3:10 PM EDT  To: [facility]  Subject: Non-Urgent Medical Question                      Good afternoon  Please do not forward any information about myself or my health to my past/current employer, [employer]  at this time  Thank you for your understanding  Have a great day  You ladies are always so nice  The world needs more of all you

## 2021-07-26 DIAGNOSIS — F41.9 ANXIETY: ICD-10-CM

## 2021-07-26 DIAGNOSIS — I10 ESSENTIAL HYPERTENSION: ICD-10-CM

## 2021-07-26 DIAGNOSIS — M10.9 GOUT, UNSPECIFIED CAUSE, UNSPECIFIED CHRONICITY, UNSPECIFIED SITE: ICD-10-CM

## 2021-07-26 RX ORDER — LISINOPRIL 20 MG/1
20 TABLET ORAL DAILY
Qty: 90 TABLET | Refills: 0 | Status: SHIPPED | OUTPATIENT
Start: 2021-07-26 | End: 2021-11-22

## 2021-07-26 RX ORDER — LISINOPRIL 20 MG/1
TABLET ORAL
Qty: 90 TABLET | Refills: 0 | OUTPATIENT
Start: 2021-07-26

## 2021-07-26 RX ORDER — COLCHICINE 0.6 MG/1
0.6 TABLET ORAL DAILY
Qty: 90 TABLET | Refills: 1 | Status: SHIPPED | OUTPATIENT
Start: 2021-07-26 | End: 2022-02-03

## 2021-07-26 RX ORDER — BUSPIRONE HYDROCHLORIDE 5 MG/1
5 TABLET ORAL 3 TIMES DAILY
Qty: 270 TABLET | Refills: 0 | Status: SHIPPED | OUTPATIENT
Start: 2021-07-26 | End: 2021-10-15

## 2021-07-26 RX ORDER — COLCHICINE 0.6 MG/1
TABLET ORAL
Qty: 90 TABLET | Refills: 1 | OUTPATIENT
Start: 2021-07-26

## 2021-09-07 ENCOUNTER — TELEPHONE (OUTPATIENT)
Dept: FAMILY MEDICINE CLINIC | Facility: CLINIC | Age: 41
End: 2021-09-07

## 2021-09-07 NOTE — TELEPHONE ENCOUNTER
Please call patient and notify him that his insurance will not cover the specific brand of Paxil he is on  Does he want to try the generic? Or has he failed the generic in the past? Or does he want to pay out of pocket for his same medication?

## 2021-09-10 ENCOUNTER — TELEPHONE (OUTPATIENT)
Dept: FAMILY MEDICINE CLINIC | Facility: CLINIC | Age: 41
End: 2021-09-10

## 2021-09-10 NOTE — TELEPHONE ENCOUNTER
Shanika Christine, please see message below  I am willing to do a peer to peer to get his medication approved  Please let me know when we can get this scheduled  Thanks!

## 2021-09-13 NOTE — TELEPHONE ENCOUNTER
HIGHMARK MENTIONED NO PEER TO PEER COULD BE DONE SINCE THE APPEAL WAS ALREADY CLOSED OUT  OPTION IS TO PAY CASH FOR THE MEDICATION  ON HOLD FOR 48 MIN WITH SAME OUTCOME TWICE WITH 2 REPRESENTATIVES  IT WAS ALREADY APPEALED AND DENIED

## 2021-09-16 DIAGNOSIS — F43.10 PTSD (POST-TRAUMATIC STRESS DISORDER): ICD-10-CM

## 2021-09-16 DIAGNOSIS — F41.9 ANXIETY: ICD-10-CM

## 2021-09-16 RX ORDER — PAROXETINE HYDROCHLORIDE 37.5 MG/1
37.5 TABLET, FILM COATED, EXTENDED RELEASE ORAL EVERY MORNING
Qty: 90 TABLET | Refills: 0 | Status: SHIPPED | OUTPATIENT
Start: 2021-09-16 | End: 2021-09-17

## 2021-09-16 RX ORDER — PAROXETINE HYDROCHLORIDE 25 MG/1
25 TABLET, FILM COATED, EXTENDED RELEASE ORAL EVERY MORNING
Qty: 90 TABLET | Refills: 0 | Status: SHIPPED | OUTPATIENT
Start: 2021-09-16 | End: 2021-09-17

## 2021-09-17 DIAGNOSIS — F43.10 PTSD (POST-TRAUMATIC STRESS DISORDER): Primary | ICD-10-CM

## 2021-09-17 DIAGNOSIS — F32.A DEPRESSION, UNSPECIFIED DEPRESSION TYPE: ICD-10-CM

## 2021-09-17 DIAGNOSIS — F41.9 ANXIETY: ICD-10-CM

## 2021-09-17 RX ORDER — PAROXETINE 30 MG/1
30 TABLET, FILM COATED ORAL EVERY MORNING
Qty: 90 TABLET | Refills: 0 | Status: SHIPPED | OUTPATIENT
Start: 2021-09-17 | End: 2021-12-13

## 2021-09-17 RX ORDER — PAROXETINE HYDROCHLORIDE 20 MG/1
20 TABLET, FILM COATED ORAL DAILY
Qty: 90 TABLET | Refills: 0 | Status: SHIPPED | OUTPATIENT
Start: 2021-09-17 | End: 2022-01-10

## 2021-10-15 DIAGNOSIS — F41.9 ANXIETY: ICD-10-CM

## 2021-10-15 RX ORDER — BUSPIRONE HYDROCHLORIDE 5 MG/1
TABLET ORAL
Qty: 270 TABLET | Refills: 0 | Status: SHIPPED | OUTPATIENT
Start: 2021-10-15 | End: 2021-11-21 | Stop reason: SDUPTHER
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
## 2021-11-21 DIAGNOSIS — M10.9 GOUT, UNSPECIFIED CAUSE, UNSPECIFIED CHRONICITY, UNSPECIFIED SITE: ICD-10-CM

## 2021-11-21 DIAGNOSIS — I10 ESSENTIAL HYPERTENSION: ICD-10-CM

## 2021-11-21 DIAGNOSIS — F41.9 ANXIETY: ICD-10-CM

## 2021-11-22 RX ORDER — LISINOPRIL 20 MG/1
TABLET ORAL
Qty: 90 TABLET | Refills: 0 | Status: SHIPPED | OUTPATIENT
Start: 2021-11-22 | End: 2022-02-03

## 2021-11-22 RX ORDER — ALLOPURINOL 100 MG/1
TABLET ORAL
Qty: 180 TABLET | Refills: 1 | Status: SHIPPED | OUTPATIENT
Start: 2021-11-22 | End: 2022-03-18

## 2021-11-22 RX ORDER — BUSPIRONE HYDROCHLORIDE 5 MG/1
5 TABLET ORAL 3 TIMES DAILY
Qty: 270 TABLET | Refills: 0 | Status: SHIPPED | OUTPATIENT
Start: 2021-11-22 | End: 2022-02-15

## 2021-12-01 ENCOUNTER — TELEMEDICINE (OUTPATIENT)
Dept: FAMILY MEDICINE CLINIC | Facility: CLINIC | Age: 41
End: 2021-12-01
Payer: COMMERCIAL

## 2021-12-01 VITALS — HEIGHT: 67 IN | BODY MASS INDEX: 39.55 KG/M2 | WEIGHT: 252 LBS

## 2021-12-01 DIAGNOSIS — J01.00 ACUTE NON-RECURRENT MAXILLARY SINUSITIS: Primary | ICD-10-CM

## 2021-12-01 PROCEDURE — 99213 OFFICE O/P EST LOW 20 MIN: CPT | Performed by: PHYSICIAN ASSISTANT

## 2021-12-01 PROCEDURE — 1036F TOBACCO NON-USER: CPT | Performed by: PHYSICIAN ASSISTANT

## 2021-12-01 PROCEDURE — 3008F BODY MASS INDEX DOCD: CPT | Performed by: PHYSICIAN ASSISTANT

## 2021-12-01 RX ORDER — AZITHROMYCIN 250 MG/1
TABLET, FILM COATED ORAL
Qty: 6 TABLET | Refills: 0 | Status: SHIPPED | OUTPATIENT
Start: 2021-12-01 | End: 2021-12-05

## 2021-12-07 DIAGNOSIS — R05.9 COUGH: Primary | ICD-10-CM

## 2021-12-07 RX ORDER — BENZONATATE 100 MG/1
100 CAPSULE ORAL 3 TIMES DAILY PRN
Qty: 20 CAPSULE | Refills: 0 | Status: SHIPPED | OUTPATIENT
Start: 2021-12-07

## 2021-12-11 DIAGNOSIS — I10 ESSENTIAL HYPERTENSION: ICD-10-CM

## 2021-12-13 RX ORDER — AMLODIPINE BESYLATE 5 MG/1
TABLET ORAL
Qty: 90 TABLET | Refills: 1 | Status: SHIPPED | OUTPATIENT
Start: 2021-12-13 | End: 2022-06-15

## 2021-12-22 DIAGNOSIS — H00.013 HORDEOLUM OF RIGHT EYE, UNSPECIFIED EYELID, UNSPECIFIED HORDEOLUM TYPE: Primary | ICD-10-CM

## 2021-12-22 RX ORDER — TOBRAMYCIN 3 MG/ML
2 SOLUTION/ DROPS OPHTHALMIC
Qty: 3.5 ML | Refills: 0 | Status: SHIPPED | OUTPATIENT
Start: 2021-12-22 | End: 2021-12-29

## 2022-01-08 DIAGNOSIS — F43.10 PTSD (POST-TRAUMATIC STRESS DISORDER): ICD-10-CM

## 2022-01-08 DIAGNOSIS — F41.9 ANXIETY: ICD-10-CM

## 2022-01-08 DIAGNOSIS — F32.A DEPRESSION, UNSPECIFIED DEPRESSION TYPE: ICD-10-CM

## 2022-01-10 RX ORDER — PAROXETINE HYDROCHLORIDE 20 MG/1
20 TABLET, FILM COATED ORAL DAILY
Qty: 90 TABLET | Refills: 0 | Status: SHIPPED | OUTPATIENT
Start: 2022-01-10 | End: 2022-01-27 | Stop reason: ALTCHOICE

## 2022-01-11 DIAGNOSIS — E78.5 HYPERLIPIDEMIA, UNSPECIFIED HYPERLIPIDEMIA TYPE: ICD-10-CM

## 2022-01-11 RX ORDER — ATORVASTATIN CALCIUM 10 MG/1
TABLET, FILM COATED ORAL
Qty: 90 TABLET | Refills: 1 | Status: SHIPPED | OUTPATIENT
Start: 2022-01-11

## 2022-01-27 ENCOUNTER — OFFICE VISIT (OUTPATIENT)
Dept: FAMILY MEDICINE CLINIC | Facility: CLINIC | Age: 42
End: 2022-01-27
Payer: COMMERCIAL

## 2022-01-27 VITALS
OXYGEN SATURATION: 98 % | WEIGHT: 268.2 LBS | HEIGHT: 67 IN | TEMPERATURE: 98.3 F | BODY MASS INDEX: 42.09 KG/M2 | SYSTOLIC BLOOD PRESSURE: 132 MMHG | DIASTOLIC BLOOD PRESSURE: 78 MMHG | HEART RATE: 65 BPM

## 2022-01-27 DIAGNOSIS — F33.9 DEPRESSION, RECURRENT (HCC): ICD-10-CM

## 2022-01-27 DIAGNOSIS — F41.9 ANXIETY: ICD-10-CM

## 2022-01-27 DIAGNOSIS — F43.10 PTSD (POST-TRAUMATIC STRESS DISORDER): ICD-10-CM

## 2022-01-27 DIAGNOSIS — F33.1 MODERATE EPISODE OF RECURRENT MAJOR DEPRESSIVE DISORDER (HCC): Primary | ICD-10-CM

## 2022-01-27 PROCEDURE — 1036F TOBACCO NON-USER: CPT | Performed by: PHYSICIAN ASSISTANT

## 2022-01-27 PROCEDURE — 99214 OFFICE O/P EST MOD 30 MIN: CPT | Performed by: PHYSICIAN ASSISTANT

## 2022-01-27 PROCEDURE — 3725F SCREEN DEPRESSION PERFORMED: CPT | Performed by: PHYSICIAN ASSISTANT

## 2022-01-27 PROCEDURE — 3008F BODY MASS INDEX DOCD: CPT | Performed by: PHYSICIAN ASSISTANT

## 2022-01-27 RX ORDER — PAROXETINE HYDROCHLORIDE 25 MG/1
25 TABLET, FILM COATED, EXTENDED RELEASE ORAL EVERY MORNING
Qty: 90 TABLET | Refills: 1 | Status: SHIPPED | OUTPATIENT
Start: 2022-01-27 | End: 2022-05-18 | Stop reason: ALTCHOICE

## 2022-01-27 RX ORDER — PAROXETINE HYDROCHLORIDE 37.5 MG/1
37.5 TABLET, FILM COATED, EXTENDED RELEASE ORAL EVERY MORNING
Qty: 90 TABLET | Refills: 1 | Status: SHIPPED | OUTPATIENT
Start: 2022-01-27 | End: 2022-05-18 | Stop reason: ALTCHOICE

## 2022-01-27 NOTE — PROGRESS NOTES
Assessment/Plan:    Problem List Items Addressed This Visit        Other    PTSD (post-traumatic stress disorder)    Relevant Medications    PARoxetine (PAXIL-CR) 37 5 mg 24 hr tablet    PARoxetine (PAXIL-CR) 25 mg 24 hr tablet    Anxiety    Relevant Medications    PARoxetine (PAXIL-CR) 37 5 mg 24 hr tablet    PARoxetine (PAXIL-CR) 25 mg 24 hr tablet    Moderate episode of recurrent major depressive disorder (HCC) - Primary    Relevant Medications    PARoxetine (PAXIL-CR) 37 5 mg 24 hr tablet    PARoxetine (PAXIL-CR) 25 mg 24 hr tablet    Depression, recurrent (HCC)    Relevant Medications    PARoxetine (PAXIL-CR) 37 5 mg 24 hr tablet    PARoxetine (PAXIL-CR) 25 mg 24 hr tablet           Diagnoses and all orders for this visit:    Moderate episode of recurrent major depressive disorder (HCC)  -     PARoxetine (PAXIL-CR) 37 5 mg 24 hr tablet; Take 1 tablet (37 5 mg total) by mouth every morning Take in addition to 25mg tablet to total 62 5 mg daily  -     PARoxetine (PAXIL-CR) 25 mg 24 hr tablet; Take 1 tablet (25 mg total) by mouth every morning Take in addition to 37 5 mg tablet to total 62 5 mg daily    Anxiety  -     PARoxetine (PAXIL-CR) 37 5 mg 24 hr tablet; Take 1 tablet (37 5 mg total) by mouth every morning Take in addition to 25mg tablet to total 62 5 mg daily  -     PARoxetine (PAXIL-CR) 25 mg 24 hr tablet; Take 1 tablet (25 mg total) by mouth every morning Take in addition to 37 5 mg tablet to total 62 5 mg daily    PTSD (post-traumatic stress disorder)  -     PARoxetine (PAXIL-CR) 37 5 mg 24 hr tablet; Take 1 tablet (37 5 mg total) by mouth every morning Take in addition to 25mg tablet to total 62 5 mg daily  -     PARoxetine (PAXIL-CR) 25 mg 24 hr tablet; Take 1 tablet (25 mg total) by mouth every morning Take in addition to 37 5 mg tablet to total 62 5 mg daily    Depression, recurrent (HCC)        Will try to get Paxil CR approved by his insurance since he failed Paxil   He was stable on the CR version  No suicidal or homicidal thoughts  Subjective:      Patient ID: Ileana Phillip is a 40 y o  male  Etta Saul is a pleasant 40year old male who is here today for a follow-up for anxiety, depression, and PTSD  In September 2021, his insurance company would no longer cover Paxil CR and required him to fail Paxil  He has been on Paxil CR since he was 26years old and has been stable on the medication  However, since being switched to the regular Paxil, he has been having increased anxiety, agitation, short-fused, poor sleep, and headaches  He feels unstable on the medication  He wishes to go back onto the Paxil CR  He denies any suicidal or homicidal thoughts  The following portions of the patient's history were reviewed and updated as appropriate:   He has a past medical history of Blindness of left eye and Knee pain, right ,  does not have any pertinent problems on file  ,   has a past surgical history that includes Eye surgery; Neck surgery; Wrist surgery; Hand surgery; and FL injection right knee (arthrogram) (5/21/2019)  ,  family history includes Anxiety disorder in his mother; Depression in his mother; Diabetes in his mother; Hypertension in his mother  ,   reports that he has never smoked  He has never used smokeless tobacco  He reports that he does not drink alcohol and does not use drugs  ,  is allergic to amoxil [amoxicillin], ciprofloxacin, and penicillins     Current Outpatient Medications   Medication Sig Dispense Refill    allopurinol (ZYLOPRIM) 100 mg tablet TAKE 2 TABLETS BY MOUTH EVERY  tablet 1    ALPRAZolam (XANAX) 0 25 mg tablet Take 1 tablet (0 25 mg total) by mouth daily as needed for anxiety 10 tablet 0    amLODIPine (NORVASC) 5 mg tablet TAKE 1 TABLET BY MOUTH EVERY DAY 90 tablet 1    atorvastatin (LIPITOR) 10 mg tablet TAKE 1 TABLET BY MOUTH EVERY DAY 90 tablet 1    benzonatate (TESSALON PERLES) 100 mg capsule Take 1 capsule (100 mg total) by mouth 3 (three) times a day as needed for cough 20 capsule 0    brimonidine (ALPHAGAN P) 0 15 % ophthalmic solution 1 drop 3 (three) times a day      busPIRone (BUSPAR) 5 mg tablet Take 1 tablet (5 mg total) by mouth 3 (three) times a day 270 tablet 0    colchicine (COLCRYS) 0 6 mg tablet Take 1 tablet (0 6 mg total) by mouth daily 90 tablet 1    dorzolamide (TRUSOPT) 2 % ophthalmic solution 1 drop 3 (three) times a day      lisinopril (ZESTRIL) 20 mg tablet TAKE 1 TABLET BY MOUTH EVERY DAY 90 tablet 0    prednisoLONE acetate (PRED FORTE) 1 % ophthalmic suspension 1 drop 4 (four) times a day      PARoxetine (PAXIL-CR) 25 mg 24 hr tablet Take 1 tablet (25 mg total) by mouth every morning Take in addition to 37 5 mg tablet to total 62 5 mg daily 90 tablet 1    PARoxetine (PAXIL-CR) 37 5 mg 24 hr tablet Take 1 tablet (37 5 mg total) by mouth every morning Take in addition to 25mg tablet to total 62 5 mg daily 90 tablet 1     No current facility-administered medications for this visit  Review of Systems   Constitutional: Negative for chills, diaphoresis, fatigue and fever  HENT: Negative for congestion, ear pain, postnasal drip, rhinorrhea, sneezing, sore throat and trouble swallowing  Eyes: Negative for pain and visual disturbance  Respiratory: Negative for apnea, cough, shortness of breath and wheezing  Cardiovascular: Negative for chest pain and palpitations  Gastrointestinal: Negative for abdominal pain, constipation, diarrhea, nausea and vomiting  Genitourinary: Negative for dysuria and hematuria  Musculoskeletal: Negative for arthralgias, gait problem and myalgias  Neurological: Positive for headaches  Negative for dizziness, syncope, weakness, light-headedness and numbness  Psychiatric/Behavioral: Positive for agitation, decreased concentration, dysphoric mood and sleep disturbance  Negative for suicidal ideas  The patient is nervous/anxious            Objective:  Vitals:    01/27/22 0807   BP: 132/78   Pulse: 65   Temp: 98 3 °F (36 8 °C)   SpO2: 98%   Weight: 122 kg (268 lb 3 2 oz)   Height: 5' 7" (1 702 m)     Body mass index is 42 01 kg/m²  Physical Exam  Vitals and nursing note reviewed  Constitutional:       Appearance: He is well-developed  HENT:      Head: Normocephalic and atraumatic  Right Ear: External ear normal       Left Ear: External ear normal       Nose: Nose normal       Mouth/Throat:      Pharynx: No oropharyngeal exudate or posterior oropharyngeal erythema  Eyes:      Extraocular Movements: Extraocular movements intact  Cardiovascular:      Rate and Rhythm: Normal rate and regular rhythm  Heart sounds: Normal heart sounds  No murmur heard  No friction rub  No gallop  Pulmonary:      Effort: Pulmonary effort is normal  No respiratory distress  Breath sounds: Normal breath sounds  No wheezing or rales  Abdominal:      Palpations: Abdomen is soft  Musculoskeletal:         General: Normal range of motion  Cervical back: Normal range of motion and neck supple  Lymphadenopathy:      Cervical: No cervical adenopathy  Skin:     General: Skin is warm and dry  Neurological:      Mental Status: He is alert and oriented to person, place, and time  Psychiatric:         Mood and Affect: Mood is anxious and depressed  Behavior: Behavior normal          Thought Content: Thought content normal  Thought content does not include homicidal or suicidal ideation  Thought content does not include homicidal or suicidal plan           Judgment: Judgment normal

## 2022-02-01 DIAGNOSIS — B34.9 VIRAL INFECTION, UNSPECIFIED: Primary | ICD-10-CM

## 2022-02-03 DIAGNOSIS — M10.9 GOUT, UNSPECIFIED CAUSE, UNSPECIFIED CHRONICITY, UNSPECIFIED SITE: ICD-10-CM

## 2022-02-03 DIAGNOSIS — I10 ESSENTIAL HYPERTENSION: ICD-10-CM

## 2022-02-03 RX ORDER — COLCHICINE 0.6 MG/1
TABLET ORAL
Qty: 90 TABLET | Refills: 1 | Status: SHIPPED | OUTPATIENT
Start: 2022-02-03

## 2022-02-03 RX ORDER — LISINOPRIL 20 MG/1
TABLET ORAL
Qty: 90 TABLET | Refills: 0 | Status: SHIPPED | OUTPATIENT
Start: 2022-02-03 | End: 2022-05-20

## 2022-02-15 DIAGNOSIS — F41.9 ANXIETY: ICD-10-CM

## 2022-02-15 RX ORDER — BUSPIRONE HYDROCHLORIDE 5 MG/1
TABLET ORAL
Qty: 270 TABLET | Refills: 0 | Status: SHIPPED | OUTPATIENT
Start: 2022-02-15 | End: 2022-05-20

## 2022-03-18 DIAGNOSIS — M10.9 GOUT, UNSPECIFIED CAUSE, UNSPECIFIED CHRONICITY, UNSPECIFIED SITE: ICD-10-CM

## 2022-03-18 RX ORDER — ALLOPURINOL 100 MG/1
TABLET ORAL
Qty: 180 TABLET | Refills: 1 | Status: SHIPPED | OUTPATIENT
Start: 2022-03-18

## 2022-05-18 ENCOUNTER — OFFICE VISIT (OUTPATIENT)
Dept: FAMILY MEDICINE CLINIC | Facility: CLINIC | Age: 42
End: 2022-05-18
Payer: COMMERCIAL

## 2022-05-18 VITALS
BODY MASS INDEX: 41.72 KG/M2 | OXYGEN SATURATION: 98 % | TEMPERATURE: 98.3 F | HEART RATE: 80 BPM | WEIGHT: 265.8 LBS | SYSTOLIC BLOOD PRESSURE: 134 MMHG | DIASTOLIC BLOOD PRESSURE: 96 MMHG | HEIGHT: 67 IN

## 2022-05-18 DIAGNOSIS — F41.9 ANXIETY: ICD-10-CM

## 2022-05-18 DIAGNOSIS — F43.10 PTSD (POST-TRAUMATIC STRESS DISORDER): ICD-10-CM

## 2022-05-18 DIAGNOSIS — F33.1 MODERATE EPISODE OF RECURRENT MAJOR DEPRESSIVE DISORDER (HCC): Primary | ICD-10-CM

## 2022-05-18 PROCEDURE — 3008F BODY MASS INDEX DOCD: CPT | Performed by: PHYSICIAN ASSISTANT

## 2022-05-18 PROCEDURE — 99214 OFFICE O/P EST MOD 30 MIN: CPT | Performed by: PHYSICIAN ASSISTANT

## 2022-05-18 PROCEDURE — 1036F TOBACCO NON-USER: CPT | Performed by: PHYSICIAN ASSISTANT

## 2022-05-18 RX ORDER — SERTRALINE HYDROCHLORIDE 100 MG/1
100 TABLET, FILM COATED ORAL
Qty: 30 TABLET | Refills: 1 | Status: SHIPPED | OUTPATIENT
Start: 2022-05-18 | End: 2022-06-15 | Stop reason: SDUPTHER

## 2022-05-18 NOTE — LETTER
May 18, 2022     Patient: Dulce Maria Olvera  YOB: 1980  Date of Visit: 5/18/2022      To Whom it May Concern:    Dulce Maria Olvera is under my professional care  Andra Flowers was seen in my office on 5/18/2022  He should be excused from work 5/16/2022-5/22/2022  Andra Flowers may return to work on 5/23/2022 without restrictions  If you have any questions or concerns, please don't hesitate to call           Sincerely,          Leisa Roberts PA-C

## 2022-05-18 NOTE — PROGRESS NOTES
Assessment/Plan:    Problem List Items Addressed This Visit        Other    PTSD (post-traumatic stress disorder)    Relevant Medications    sertraline (ZOLOFT) 100 mg tablet    Anxiety    Relevant Medications    sertraline (ZOLOFT) 100 mg tablet    Moderate episode of recurrent major depressive disorder (HCC) - Primary    Relevant Medications    sertraline (ZOLOFT) 100 mg tablet           Diagnoses and all orders for this visit:    Moderate episode of recurrent major depressive disorder (HCC)  -     sertraline (ZOLOFT) 100 mg tablet; Take 1 tablet (100 mg total) by mouth daily at bedtime    Anxiety  -     sertraline (ZOLOFT) 100 mg tablet; Take 1 tablet (100 mg total) by mouth daily at bedtime    PTSD (post-traumatic stress disorder)  -     sertraline (ZOLOFT) 100 mg tablet; Take 1 tablet (100 mg total) by mouth daily at bedtime      Will switch from Paxil to Zoloft  Encouraged him to continue to try to get in with a counselor  Follow-up in 1 month or sooner if needed    Subjective:      Patient ID: Shanti Calvo is a 40 y o  male  Joséabi Nareshs is a very pleasant 40year old male who is here today for a follow-up for anxiety, depression, and PTSD  He has been on Paxil since he was 17years old  He feels like it is no longer working  He has a lot of emotional stressors in his life  His wife is having a lot of medical problems, which has been difficult on him and his family  He started a new job and is their only source of income, which has been stressful  He has little motivation to get out of bed in the morning  He denies any suicidal or homicidal thoughts  He has been trying to get back in with his counselor, but there is a long wait  He has never been on anything besides the Paxil  The following portions of the patient's history were reviewed and updated as appropriate:   He has a past medical history of Blindness of left eye and Knee pain, right ,  does not have any pertinent problems on file  ,   has a past surgical history that includes Eye surgery; Neck surgery; Wrist surgery; Hand surgery; and FL injection right knee (arthrogram) (5/21/2019)  ,  family history includes Anxiety disorder in his mother; Depression in his mother; Diabetes in his mother; Hypertension in his mother  ,   reports that he has never smoked  He has never used smokeless tobacco  He reports that he does not drink alcohol and does not use drugs  ,  is allergic to amoxil [amoxicillin], ciprofloxacin, and penicillins     Current Outpatient Medications   Medication Sig Dispense Refill    allopurinol (ZYLOPRIM) 100 mg tablet TAKE 2 TABLETS BY MOUTH EVERY  tablet 1    amLODIPine (NORVASC) 5 mg tablet TAKE 1 TABLET BY MOUTH EVERY DAY 90 tablet 1    brimonidine (ALPHAGAN P) 0 15 % ophthalmic solution 1 drop 3 (three) times a day      busPIRone (BUSPAR) 5 mg tablet TAKE 1 TABLET BY MOUTH THREE TIMES A  tablet 0    colchicine (COLCRYS) 0 6 mg tablet TAKE 1 TABLET BY MOUTH EVERY DAY 90 tablet 1    dorzolamide (TRUSOPT) 2 % ophthalmic solution 1 drop 3 (three) times a day      lisinopril (ZESTRIL) 20 mg tablet TAKE 1 TABLET BY MOUTH EVERY DAY 90 tablet 0    prednisoLONE acetate (PRED FORTE) 1 % ophthalmic suspension 1 drop 4 (four) times a day      sertraline (ZOLOFT) 100 mg tablet Take 1 tablet (100 mg total) by mouth daily at bedtime 30 tablet 1    ALPRAZolam (XANAX) 0 25 mg tablet Take 1 tablet (0 25 mg total) by mouth daily as needed for anxiety (Patient not taking: Reported on 5/18/2022) 10 tablet 0    atorvastatin (LIPITOR) 10 mg tablet TAKE 1 TABLET BY MOUTH EVERY DAY (Patient not taking: Reported on 5/18/2022) 90 tablet 1    benzonatate (TESSALON PERLES) 100 mg capsule Take 1 capsule (100 mg total) by mouth 3 (three) times a day as needed for cough (Patient not taking: Reported on 5/18/2022) 20 capsule 0     No current facility-administered medications for this visit         Review of Systems   Constitutional: Negative for chills, diaphoresis, fatigue and fever  HENT: Negative for congestion, ear pain, postnasal drip, rhinorrhea, sneezing, sore throat and trouble swallowing  Eyes: Negative for pain and visual disturbance  Respiratory: Negative for apnea, cough, shortness of breath and wheezing  Cardiovascular: Negative for chest pain and palpitations  Gastrointestinal: Negative for abdominal pain, constipation, diarrhea, nausea and vomiting  Genitourinary: Negative for dysuria and hematuria  Musculoskeletal: Positive for arthralgias  Negative for gait problem and myalgias  Neurological: Negative for dizziness, syncope, weakness, light-headedness, numbness and headaches  Psychiatric/Behavioral: Positive for dysphoric mood  Negative for suicidal ideas  The patient is nervous/anxious  Objective:  Vitals:    05/18/22 0712   BP: 134/96   Pulse: 80   Temp: 98 3 °F (36 8 °C)   SpO2: 98%   Weight: 121 kg (265 lb 12 8 oz)   Height: 5' 7" (1 702 m)     Body mass index is 41 63 kg/m²  Physical Exam  Vitals and nursing note reviewed  Constitutional:       Appearance: He is well-developed  HENT:      Head: Normocephalic and atraumatic  Right Ear: External ear normal       Left Ear: External ear normal       Nose: Nose normal       Mouth/Throat:      Pharynx: No oropharyngeal exudate or posterior oropharyngeal erythema  Eyes:      Extraocular Movements: Extraocular movements intact  Cardiovascular:      Rate and Rhythm: Normal rate and regular rhythm  Heart sounds: Normal heart sounds  No murmur heard  No friction rub  No gallop  Pulmonary:      Effort: Pulmonary effort is normal  No respiratory distress  Breath sounds: Normal breath sounds  No wheezing or rales  Musculoskeletal:         General: Normal range of motion  Cervical back: Normal range of motion and neck supple  Lymphadenopathy:      Cervical: No cervical adenopathy  Skin:     General: Skin is warm and dry  Neurological:      Mental Status: He is alert and oriented to person, place, and time  Psychiatric:         Mood and Affect: Mood is depressed  Mood is not anxious  Behavior: Behavior normal          Thought Content: Thought content normal  Thought content does not include homicidal or suicidal ideation  Thought content does not include homicidal or suicidal plan           Judgment: Judgment normal

## 2022-05-20 DIAGNOSIS — F41.9 ANXIETY: ICD-10-CM

## 2022-05-20 DIAGNOSIS — I10 ESSENTIAL HYPERTENSION: ICD-10-CM

## 2022-05-20 RX ORDER — LISINOPRIL 20 MG/1
TABLET ORAL
Qty: 90 TABLET | Refills: 0 | Status: SHIPPED | OUTPATIENT
Start: 2022-05-20

## 2022-05-20 RX ORDER — BUSPIRONE HYDROCHLORIDE 5 MG/1
TABLET ORAL
Qty: 270 TABLET | Refills: 0 | Status: SHIPPED | OUTPATIENT
Start: 2022-05-20

## 2022-05-23 ENCOUNTER — TELEPHONE (OUTPATIENT)
Dept: FAMILY MEDICINE CLINIC | Facility: CLINIC | Age: 42
End: 2022-05-23

## 2022-05-23 NOTE — TELEPHONE ENCOUNTER
Wife called, patient was just put on new medications but couldn't afford to pick them up until Thursday  Patient is still struggling, asking if you can extend his out of work note until 5/31 until he can see how the medications work for him  Please advise

## 2022-05-31 ENCOUNTER — TELEPHONE (OUTPATIENT)
Dept: FAMILY MEDICINE CLINIC | Facility: CLINIC | Age: 42
End: 2022-05-31

## 2022-05-31 NOTE — TELEPHONE ENCOUNTER
Patient finally off Paxil, now has major headache from switching  Asking if you can extend his note to return next Monday until he gets use to the new medications

## 2022-06-15 ENCOUNTER — OFFICE VISIT (OUTPATIENT)
Dept: FAMILY MEDICINE CLINIC | Facility: CLINIC | Age: 42
End: 2022-06-15
Payer: COMMERCIAL

## 2022-06-15 VITALS
HEART RATE: 75 BPM | SYSTOLIC BLOOD PRESSURE: 140 MMHG | OXYGEN SATURATION: 97 % | WEIGHT: 252.8 LBS | DIASTOLIC BLOOD PRESSURE: 84 MMHG | HEIGHT: 67 IN | TEMPERATURE: 98.5 F | BODY MASS INDEX: 39.68 KG/M2

## 2022-06-15 DIAGNOSIS — F41.9 ANXIETY: ICD-10-CM

## 2022-06-15 DIAGNOSIS — I10 ESSENTIAL HYPERTENSION: ICD-10-CM

## 2022-06-15 DIAGNOSIS — F33.1 MODERATE EPISODE OF RECURRENT MAJOR DEPRESSIVE DISORDER (HCC): Primary | ICD-10-CM

## 2022-06-15 DIAGNOSIS — F43.10 PTSD (POST-TRAUMATIC STRESS DISORDER): ICD-10-CM

## 2022-06-15 DIAGNOSIS — F33.9 DEPRESSION, RECURRENT (HCC): ICD-10-CM

## 2022-06-15 PROCEDURE — 99214 OFFICE O/P EST MOD 30 MIN: CPT | Performed by: PHYSICIAN ASSISTANT

## 2022-06-15 PROCEDURE — 1036F TOBACCO NON-USER: CPT | Performed by: PHYSICIAN ASSISTANT

## 2022-06-15 PROCEDURE — 3008F BODY MASS INDEX DOCD: CPT | Performed by: PHYSICIAN ASSISTANT

## 2022-06-15 RX ORDER — AMLODIPINE BESYLATE 5 MG/1
TABLET ORAL
Qty: 90 TABLET | Refills: 1 | Status: SHIPPED | OUTPATIENT
Start: 2022-06-15

## 2022-06-15 RX ORDER — SERTRALINE HYDROCHLORIDE 25 MG/1
25 TABLET, FILM COATED ORAL DAILY
Qty: 30 TABLET | Refills: 1 | Status: SHIPPED | OUTPATIENT
Start: 2022-06-15 | End: 2022-07-07 | Stop reason: SDUPTHER

## 2022-06-15 RX ORDER — SERTRALINE HYDROCHLORIDE 100 MG/1
100 TABLET, FILM COATED ORAL DAILY
Qty: 30 TABLET | Refills: 1 | Status: SHIPPED | OUTPATIENT
Start: 2022-06-15

## 2022-06-15 NOTE — PROGRESS NOTES
Assessment/Plan:    Problem List Items Addressed This Visit        Other    PTSD (post-traumatic stress disorder)    Relevant Medications    sertraline (ZOLOFT) 100 mg tablet    sertraline (ZOLOFT) 25 mg tablet    Anxiety    Relevant Medications    sertraline (ZOLOFT) 100 mg tablet    sertraline (ZOLOFT) 25 mg tablet    Moderate episode of recurrent major depressive disorder (HCC) - Primary    Relevant Medications    sertraline (ZOLOFT) 100 mg tablet    sertraline (ZOLOFT) 25 mg tablet    Depression, recurrent (HCC)    Relevant Medications    sertraline (ZOLOFT) 100 mg tablet    sertraline (ZOLOFT) 25 mg tablet           Diagnoses and all orders for this visit:    Moderate episode of recurrent major depressive disorder (HCC)  -     sertraline (ZOLOFT) 100 mg tablet; Take 1 tablet (100 mg total) by mouth daily  -     sertraline (ZOLOFT) 25 mg tablet; Take 1 tablet (25 mg total) by mouth daily    Depression, recurrent (HCC)  -     sertraline (ZOLOFT) 25 mg tablet; Take 1 tablet (25 mg total) by mouth daily    Anxiety  -     sertraline (ZOLOFT) 100 mg tablet; Take 1 tablet (100 mg total) by mouth daily  -     sertraline (ZOLOFT) 25 mg tablet; Take 1 tablet (25 mg total) by mouth daily    PTSD (post-traumatic stress disorder)  -     sertraline (ZOLOFT) 100 mg tablet; Take 1 tablet (100 mg total) by mouth daily  -     sertraline (ZOLOFT) 25 mg tablet; Take 1 tablet (25 mg total) by mouth daily      Recommended trying to take zoloft in the morning instead of the evening  Will increase to 125 mg  I recommended that he establish with a psychiatrist  I think he may have more bipolar tendencies than just anxiety and depression  He used to see Dr Berhane Cantu at [facility]  He plans to reach out to schedule  Follow-up in 1 month or sooner if needed    Subjective:      Patient ID: Raymundo Hoover is a 40 y o  male      Tommie Prakash is a very pleasant 40year old male who is here today for a follow-up for anxiety, depression, and PTSD  He has been struggling since switching to the zoloft  He admits that usually by mid-afternoon he is having a panic attack where he feels dizzy and lightheaded  He has some days where he does not want to get out of bed, or has low energy  He denies any suicidal or homicidal thoughts  He did not feel significantly better on the Paxil  He admits that his mind wanders and he worries constantly about everything  He used to see a psychiatrist and was considering getting reestablished but they can't get him in until August  He did really well with a counselor in the past, but his counselor left and he has not been able to establish with someone new  The following portions of the patient's history were reviewed and updated as appropriate:   He has a past medical history of Blindness of left eye and Knee pain, right ,  does not have any pertinent problems on file  ,   has a past surgical history that includes Eye surgery; Neck surgery; Wrist surgery; Hand surgery; and FL injection right knee (arthrogram) (5/21/2019)  ,  family history includes Anxiety disorder in his mother; Depression in his mother; Diabetes in his mother; Hypertension in his mother  ,   reports that he has never smoked  He has never used smokeless tobacco  He reports that he does not drink alcohol and does not use drugs  ,  is allergic to amoxil [amoxicillin], ciprofloxacin, and penicillins     Current Outpatient Medications   Medication Sig Dispense Refill    allopurinol (ZYLOPRIM) 100 mg tablet TAKE 2 TABLETS BY MOUTH EVERY  tablet 1    amLODIPine (NORVASC) 5 mg tablet TAKE 1 TABLET BY MOUTH EVERY DAY 90 tablet 1    brimonidine (ALPHAGAN P) 0 15 % ophthalmic solution 1 drop 3 (three) times a day      busPIRone (BUSPAR) 5 mg tablet TAKE 1 TABLET BY MOUTH THREE TIMES A  tablet 0    colchicine (COLCRYS) 0 6 mg tablet TAKE 1 TABLET BY MOUTH EVERY DAY 90 tablet 1    dorzolamide (TRUSOPT) 2 % ophthalmic solution 1 drop 3 (three) times a day      lisinopril (ZESTRIL) 20 mg tablet TAKE 1 TABLET BY MOUTH EVERY DAY 90 tablet 0    prednisoLONE acetate (PRED FORTE) 1 % ophthalmic suspension 1 drop 4 (four) times a day      sertraline (ZOLOFT) 100 mg tablet Take 1 tablet (100 mg total) by mouth daily 30 tablet 1    sertraline (ZOLOFT) 25 mg tablet Take 1 tablet (25 mg total) by mouth daily 30 tablet 1    ALPRAZolam (XANAX) 0 25 mg tablet Take 1 tablet (0 25 mg total) by mouth daily as needed for anxiety (Patient not taking: No sig reported) 10 tablet 0    atorvastatin (LIPITOR) 10 mg tablet TAKE 1 TABLET BY MOUTH EVERY DAY (Patient not taking: No sig reported) 90 tablet 1    benzonatate (TESSALON PERLES) 100 mg capsule Take 1 capsule (100 mg total) by mouth 3 (three) times a day as needed for cough (Patient not taking: No sig reported) 20 capsule 0     No current facility-administered medications for this visit  Review of Systems   Constitutional: Negative for chills, diaphoresis, fatigue and fever  HENT: Negative for congestion, ear pain, postnasal drip, rhinorrhea, sneezing, sore throat and trouble swallowing  Eyes: Negative for pain  Respiratory: Negative for apnea, cough, shortness of breath and wheezing  Cardiovascular: Negative for chest pain and palpitations  Gastrointestinal: Negative for abdominal pain, constipation, diarrhea, nausea and vomiting  Genitourinary: Negative for dysuria and hematuria  Musculoskeletal: Negative for arthralgias, gait problem and myalgias  Neurological: Negative for dizziness, syncope, weakness, light-headedness, numbness and headaches  Psychiatric/Behavioral: Positive for decreased concentration, dysphoric mood and sleep disturbance  Negative for suicidal ideas  The patient is nervous/anxious            Objective:  Vitals:    06/15/22 1052   BP: 140/84   Pulse: 75   Temp: 98 5 °F (36 9 °C)   SpO2: 97%   Weight: 115 kg (252 lb 12 8 oz)   Height: 5' 7" (1 702 m)     Body mass index is 39 59 kg/m²  Physical Exam  Vitals and nursing note reviewed  Constitutional:       Appearance: He is well-developed  HENT:      Head: Normocephalic and atraumatic  Right Ear: Ear canal and external ear normal       Left Ear: External ear normal       Nose: Nose normal    Eyes:      Extraocular Movements: Extraocular movements intact  Cardiovascular:      Rate and Rhythm: Normal rate and regular rhythm  Heart sounds: Normal heart sounds  No murmur heard  No friction rub  No gallop  Pulmonary:      Effort: Pulmonary effort is normal  No respiratory distress  Breath sounds: Normal breath sounds  No wheezing or rales  Musculoskeletal:         General: Normal range of motion  Cervical back: Normal range of motion and neck supple  Lymphadenopathy:      Cervical: No cervical adenopathy  Skin:     General: Skin is warm and dry  Neurological:      Mental Status: He is alert and oriented to person, place, and time  Psychiatric:         Mood and Affect: Mood is anxious and depressed  Affect is labile  Behavior: Behavior normal          Thought Content:  Thought content normal          Judgment: Judgment normal

## 2022-07-07 DIAGNOSIS — F43.10 PTSD (POST-TRAUMATIC STRESS DISORDER): ICD-10-CM

## 2022-07-07 DIAGNOSIS — F41.9 ANXIETY: ICD-10-CM

## 2022-07-07 DIAGNOSIS — F33.9 DEPRESSION, RECURRENT (HCC): ICD-10-CM

## 2022-07-07 DIAGNOSIS — F33.1 MODERATE EPISODE OF RECURRENT MAJOR DEPRESSIVE DISORDER (HCC): ICD-10-CM

## 2022-07-18 ENCOUNTER — OFFICE VISIT (OUTPATIENT)
Dept: FAMILY MEDICINE CLINIC | Facility: CLINIC | Age: 42
End: 2022-07-18
Payer: COMMERCIAL

## 2022-07-18 VITALS
HEIGHT: 67 IN | OXYGEN SATURATION: 98 % | DIASTOLIC BLOOD PRESSURE: 86 MMHG | TEMPERATURE: 97.4 F | HEART RATE: 77 BPM | WEIGHT: 256.4 LBS | SYSTOLIC BLOOD PRESSURE: 126 MMHG | BODY MASS INDEX: 40.24 KG/M2

## 2022-07-18 DIAGNOSIS — I10 ESSENTIAL HYPERTENSION: ICD-10-CM

## 2022-07-18 DIAGNOSIS — F43.10 PTSD (POST-TRAUMATIC STRESS DISORDER): ICD-10-CM

## 2022-07-18 DIAGNOSIS — F41.9 ANXIETY: ICD-10-CM

## 2022-07-18 DIAGNOSIS — F33.9 DEPRESSION, RECURRENT (HCC): Primary | ICD-10-CM

## 2022-07-18 PROCEDURE — 99214 OFFICE O/P EST MOD 30 MIN: CPT | Performed by: PHYSICIAN ASSISTANT

## 2022-07-18 NOTE — PROGRESS NOTES
Assessment/Plan:    Problem List Items Addressed This Visit        Cardiovascular and Mediastinum    Essential hypertension       Other    PTSD (post-traumatic stress disorder)    Anxiety    Depression, recurrent ([facility] ) - Primary           Diagnoses and all orders for this visit:    Depression, recurrent ([facility] )    Anxiety    PTSD (post-traumatic stress disorder)    Essential hypertension      Continue same medications  He will have his initial appointment this week with psych   He will follow with counselor weekly as scheduled  Note provided to return to work  Follow-up in 1-2 months or sooner if needed    Subjective:      Patient ID: Lavell Vasquez is a 40 y o  male  Roque Roth is a very pleasant 40year old male who is here today for a follow-up for anxiety, depression, and PTSD  He is finally feeling better after switching from the Paxil to the Zoloft  He admits that he feels more mellow on the zoloft compared to the Paxil  However, he is still having panic attacks at times  Much of his anxiety is situational  He has a 21year old son who has been getting into a lot of trouble lately  He worries about him and is always anxious until he comes home  He denies any suicidal or homicidal thoughts  He [facility] got [facility] with [facility]  He had his intake and first counseling session  He follows up this week for his 2nd counseling session and is seeing the psychiatrist for the first time  The following portions of the patient's history were reviewed and updated as appropriate:   He has a past medical history of Blindness of left eye and Knee pain, right ,  does not have any pertinent problems on file  ,   has a past surgical history that includes Eye surgery; Neck surgery; Wrist surgery; Hand surgery; and FL injection right knee (arthrogram) (5/21/2019)  ,  family history includes Anxiety disorder in his mother; Depression in his mother; Diabetes in his mother; Hypertension in his mother  ,   reports that he has never smoked  He has never used smokeless tobacco  He reports that he does not drink alcohol and does not use drugs  ,  is allergic to amoxil [amoxicillin], ciprofloxacin, and penicillins     Current Outpatient Medications   Medication Sig Dispense Refill    allopurinol (ZYLOPRIM) 100 mg tablet TAKE 2 TABLETS BY MOUTH EVERY  tablet 1    amLODIPine (NORVASC) 5 mg tablet TAKE 1 TABLET BY MOUTH EVERY DAY 90 tablet 1    brimonidine (ALPHAGAN P) 0 15 % ophthalmic solution 1 drop 3 (three) times a day      busPIRone (BUSPAR) 5 mg tablet TAKE 1 TABLET BY MOUTH THREE TIMES A  tablet 0    colchicine (COLCRYS) 0 6 mg tablet TAKE 1 TABLET BY MOUTH EVERY DAY 90 tablet 1    dorzolamide (TRUSOPT) 2 % ophthalmic solution 1 drop 3 (three) times a day      lisinopril (ZESTRIL) 20 mg tablet TAKE 1 TABLET BY MOUTH EVERY DAY 90 tablet 0    prednisoLONE acetate (PRED FORTE) 1 % ophthalmic suspension 1 drop 4 (four) times a day      sertraline (ZOLOFT) 100 mg tablet Take 1 tablet (100 mg total) by mouth daily 30 tablet 1    sertraline (ZOLOFT) 50 mg tablet Take 1 tablet (50 mg total) by mouth daily      atorvastatin (LIPITOR) 10 mg tablet TAKE 1 TABLET BY MOUTH EVERY DAY (Patient not taking: No sig reported) 90 tablet 1    benzonatate (TESSALON PERLES) 100 mg capsule Take 1 capsule (100 mg total) by mouth 3 (three) times a day as needed for cough (Patient not taking: No sig reported) 20 capsule 0     No current facility-administered medications for this visit  Review of Systems   Constitutional: Negative for chills, diaphoresis, fatigue and fever  HENT: Negative for congestion, ear pain, postnasal drip, rhinorrhea, sneezing, sore throat and trouble swallowing  Eyes: Negative for pain and visual disturbance  Respiratory: Negative for apnea, cough, shortness of breath and wheezing  Cardiovascular: Negative for chest pain and palpitations     Gastrointestinal: Negative for abdominal pain, constipation, diarrhea, nausea and vomiting  Genitourinary: Negative for dysuria and hematuria  Musculoskeletal: Negative for arthralgias, gait problem and myalgias  Neurological: Negative for dizziness, syncope, weakness, light-headedness, numbness and headaches  Psychiatric/Behavioral: Positive for sleep disturbance  Negative for decreased concentration, dysphoric mood and suicidal ideas  The patient is nervous/anxious  Objective:  Vitals:    07/18/22 1631 07/18/22 1718   BP: 150/84 126/86   Pulse: 77    Temp: (!) 97 4 °F (36 3 °C)    SpO2: 98%    Weight: 116 kg (256 lb 6 4 oz)    Height: 5' 7" (1 702 m)      Body mass index is 40 16 kg/m²  Physical Exam  Vitals and nursing note reviewed  Constitutional:       Appearance: He is well-developed  HENT:      Head: Normocephalic and atraumatic  Right Ear: External ear normal       Left Ear: External ear normal       Nose: Nose normal    Eyes:      Extraocular Movements: Extraocular movements intact  Cardiovascular:      Rate and Rhythm: Normal rate and regular rhythm  Heart sounds: Normal heart sounds  No murmur heard  No friction rub  No gallop  Pulmonary:      Effort: Pulmonary effort is normal  No respiratory distress  Breath sounds: Normal breath sounds  No wheezing or rales  Musculoskeletal:         General: Normal range of motion  Cervical back: Normal range of motion and neck supple  Lymphadenopathy:      Cervical: No cervical adenopathy  Skin:     General: Skin is warm and dry  Neurological:      Mental Status: He is alert and oriented to person, place, and time  Psychiatric:         Mood and Affect: Mood is anxious  Mood is not depressed  Behavior: Behavior normal          Thought Content: Thought content normal  Thought content does not include homicidal or suicidal ideation  Thought content does not include homicidal or suicidal plan           Judgment: Judgment normal

## 2022-07-18 NOTE — LETTER
July 18, 2022     Patient: Frankie Parikh  YOB: 1980  Date of Visit: 7/18/2022      To Whom it May Concern:    Frankie Parikh is under my professional care  Alden Nichols was seen in my office on 7/18/2022  Alden Nichols may return to work on 7/25/2022 with no restrictions  If you have any questions or concerns, please don't hesitate to call           Sincerely,            Quinn Rojas PA-C

## 2022-07-21 DIAGNOSIS — F33.1 MODERATE EPISODE OF RECURRENT MAJOR DEPRESSIVE DISORDER (HCC): ICD-10-CM

## 2022-07-21 DIAGNOSIS — F43.10 PTSD (POST-TRAUMATIC STRESS DISORDER): ICD-10-CM

## 2022-07-21 DIAGNOSIS — F41.9 ANXIETY: ICD-10-CM

## 2022-07-21 DIAGNOSIS — F33.9 DEPRESSION, RECURRENT (HCC): ICD-10-CM

## 2022-07-28 ENCOUNTER — RA CDI HCC (OUTPATIENT)
Dept: OTHER | Facility: HOSPITAL | Age: 42
End: 2022-07-28

## 2022-07-28 NOTE — PROGRESS NOTES
[facility]  coding opportunities       Chart Reviewed number of suggestions sent to Provider: 1     Patients Insurance        Commercial Insurance: [address] [place] [address]

## 2022-08-20 DIAGNOSIS — M10.9 GOUT, UNSPECIFIED CAUSE, UNSPECIFIED CHRONICITY, UNSPECIFIED SITE: ICD-10-CM

## 2022-08-20 DIAGNOSIS — I10 ESSENTIAL HYPERTENSION: ICD-10-CM

## 2022-08-22 RX ORDER — LISINOPRIL 20 MG/1
TABLET ORAL
Qty: 90 TABLET | Refills: 0 | Status: SHIPPED | OUTPATIENT
Start: 2022-08-22

## 2022-08-22 RX ORDER — COLCHICINE 0.6 MG/1
TABLET ORAL
Qty: 90 TABLET | Refills: 1 | Status: SHIPPED | OUTPATIENT
Start: 2022-08-22

## 2022-08-25 DIAGNOSIS — F41.9 ANXIETY: ICD-10-CM

## 2022-08-25 RX ORDER — BUSPIRONE HYDROCHLORIDE 5 MG/1
TABLET ORAL
Qty: 270 TABLET | Refills: 0 | Status: SHIPPED | OUTPATIENT
Start: 2022-08-25

## 2022-09-09 DIAGNOSIS — M10.9 GOUT, UNSPECIFIED CAUSE, UNSPECIFIED CHRONICITY, UNSPECIFIED SITE: ICD-10-CM

## 2022-09-09 RX ORDER — ALLOPURINOL 100 MG/1
TABLET ORAL
Qty: 180 TABLET | Refills: 1 | Status: SHIPPED | OUTPATIENT
Start: 2022-09-09

## 2022-10-16 DIAGNOSIS — F33.9 DEPRESSION, RECURRENT (HCC): ICD-10-CM

## 2022-10-16 DIAGNOSIS — F33.1 MODERATE EPISODE OF RECURRENT MAJOR DEPRESSIVE DISORDER (HCC): ICD-10-CM

## 2022-10-16 DIAGNOSIS — F43.10 PTSD (POST-TRAUMATIC STRESS DISORDER): ICD-10-CM

## 2022-10-16 DIAGNOSIS — F41.9 ANXIETY: ICD-10-CM

## 2022-11-21 DIAGNOSIS — F41.9 ANXIETY: ICD-10-CM

## 2022-11-21 DIAGNOSIS — F43.10 PTSD (POST-TRAUMATIC STRESS DISORDER): ICD-10-CM

## 2022-11-21 DIAGNOSIS — F33.1 MODERATE EPISODE OF RECURRENT MAJOR DEPRESSIVE DISORDER (HCC): ICD-10-CM

## 2022-11-21 RX ORDER — SERTRALINE HYDROCHLORIDE 100 MG/1
TABLET, FILM COATED ORAL
Qty: 90 TABLET | Refills: 0 | Status: SHIPPED | OUTPATIENT
Start: 2022-11-21 | End: 2022-11-25 | Stop reason: SDUPTHER

## 2022-11-22 DIAGNOSIS — I10 ESSENTIAL HYPERTENSION: ICD-10-CM

## 2022-11-22 RX ORDER — LISINOPRIL 20 MG/1
TABLET ORAL
Qty: 90 TABLET | Refills: 0 | OUTPATIENT
Start: 2022-11-22

## 2022-11-23 RX ORDER — LISINOPRIL 20 MG/1
20 TABLET ORAL DAILY
Qty: 90 TABLET | Refills: 0 | Status: SHIPPED | OUTPATIENT
Start: 2022-11-23 | End: 2022-11-25 | Stop reason: SDUPTHER

## 2022-11-25 DIAGNOSIS — F41.9 ANXIETY: ICD-10-CM

## 2022-11-25 DIAGNOSIS — F43.10 PTSD (POST-TRAUMATIC STRESS DISORDER): ICD-10-CM

## 2022-11-25 DIAGNOSIS — M10.9 GOUT, UNSPECIFIED CAUSE, UNSPECIFIED CHRONICITY, UNSPECIFIED SITE: ICD-10-CM

## 2022-11-25 DIAGNOSIS — I10 ESSENTIAL HYPERTENSION: ICD-10-CM

## 2022-11-25 DIAGNOSIS — F33.9 DEPRESSION, RECURRENT (HCC): ICD-10-CM

## 2022-11-25 DIAGNOSIS — F33.1 MODERATE EPISODE OF RECURRENT MAJOR DEPRESSIVE DISORDER (HCC): ICD-10-CM

## 2022-11-28 RX ORDER — ALLOPURINOL 100 MG/1
200 TABLET ORAL DAILY
Qty: 180 TABLET | Refills: 0 | Status: SHIPPED | OUTPATIENT
Start: 2022-11-28

## 2022-11-28 RX ORDER — BUSPIRONE HYDROCHLORIDE 5 MG/1
5 TABLET ORAL 3 TIMES DAILY
Qty: 270 TABLET | Refills: 0 | Status: SHIPPED | OUTPATIENT
Start: 2022-11-28

## 2022-11-28 RX ORDER — COLCHICINE 0.6 MG/1
0.6 TABLET ORAL DAILY
Qty: 90 TABLET | Refills: 0 | Status: SHIPPED | OUTPATIENT
Start: 2022-11-28

## 2022-11-28 RX ORDER — AMLODIPINE BESYLATE 5 MG/1
5 TABLET ORAL DAILY
Qty: 90 TABLET | Refills: 0 | Status: SHIPPED | OUTPATIENT
Start: 2022-11-28

## 2022-11-28 RX ORDER — LISINOPRIL 20 MG/1
20 TABLET ORAL DAILY
Qty: 90 TABLET | Refills: 0 | Status: SHIPPED | OUTPATIENT
Start: 2022-11-28

## 2022-11-28 RX ORDER — SERTRALINE HYDROCHLORIDE 100 MG/1
100 TABLET, FILM COATED ORAL DAILY
Qty: 90 TABLET | Refills: 0 | Status: SHIPPED | OUTPATIENT
Start: 2022-11-28

## 2022-12-07 ENCOUNTER — AMB VIDEO VISIT (OUTPATIENT)
Dept: OTHER | Facility: HOSPITAL | Age: 42
End: 2022-12-07

## 2022-12-07 DIAGNOSIS — R68.89 FLU-LIKE SYMPTOMS: Primary | ICD-10-CM

## 2022-12-07 RX ORDER — FLUTICASONE PROPIONATE 50 MCG
1 SPRAY, SUSPENSION (ML) NASAL DAILY
Qty: 9.9 ML | Refills: 0 | Status: SHIPPED | OUTPATIENT
Start: 2022-12-07

## 2022-12-07 RX ORDER — BENZONATATE 100 MG/1
100 CAPSULE ORAL 3 TIMES DAILY PRN
Qty: 20 CAPSULE | Refills: 0 | Status: SHIPPED | OUTPATIENT
Start: 2022-12-07 | End: 2022-12-13 | Stop reason: ALTCHOICE

## 2022-12-07 NOTE — LETTER
December 7, 2022     Patient: Lexa Scott  YOB: 1980  Date of Visit: 12/7/2022      To Whom it May Concern:    Lexa Scott is under my professional care  [employer] was seen in my office on 12/7/2022  Please excuse him from work  [employer] may return to work on 12/12/2022  If you have any questions or concerns, please don't hesitate to call           Sincerely,          Aguilar Stevens PA-C        CC: No Recipients

## 2022-12-08 ENCOUNTER — AMB VIDEO VISIT (OUTPATIENT)
Dept: OTHER | Facility: HOSPITAL | Age: 42
End: 2022-12-08

## 2022-12-08 NOTE — PATIENT INSTRUCTIONS
Influenza   WHAT YOU NEED TO KNOW:   Influenza (the flu) is an infection caused by the influenza virus  The flu is easily spread when an infected person coughs, sneezes, or has close contact with others  You may be able to spread the flu to others for 1 week or longer after signs or symptoms appear  DISCHARGE INSTRUCTIONS:   Call your local emergency number (911 in the [address] [place] [address],[address]) if:   You have trouble breathing, and your lips look purple or blue  You have a seizure  Return to the emergency department if:   You are dizzy, or you are urinating less or not at all  You have a headache with a stiff neck, and you feel tired or confused  You have new pain or pressure in your chest     Your symptoms, such as shortness of breath, vomiting, or diarrhea, get worse  Your symptoms, such as fever and coughing, seem to get better, but then get worse  Call your doctor if:   You have new muscle pain or weakness  You have questions or concerns about your condition or care  Medicines: You may need any of the following:  Acetaminophen  decreases pain and fever  It is available without a doctor's order  Ask how much to take and how often to take it  Follow directions  Read the labels of all other medicines you are using to see if they also contain acetaminophen, or ask your doctor or pharmacist  Acetaminophen can cause liver damage if not taken correctly  Do not use more than 4 grams (4,000 milligrams) total of acetaminophen in one day  NSAIDs , such as ibuprofen, help decrease swelling, pain, and fever  This medicine is available with or without a doctor's order  NSAIDs can cause stomach bleeding or kidney problems in certain people  If you take blood thinner medicine, always ask your healthcare provider if NSAIDs are safe for you  Always read the medicine label and follow directions  Antivirals  help fight a viral infection  Take your medicine as directed    Contact your healthcare provider if you think your medicine is not helping or if you have side effects  Tell him or her if you are allergic to any medicine  Keep a list of the medicines, vitamins, and herbs you take  Include the amounts, and when and why you take them  Bring the list or the pill bottles to follow-up visits  Carry your medicine list with you in case of an emergency  Rest  as much as you can to help you recover  Drink liquids as directed  to help prevent dehydration  Ask how much liquid to drink each day and which liquids are best for you  Prevent the spread of germs:       Wash your hands often  Wash your hands several times each day  Wash after you use the bathroom, change a child's diaper, and before you prepare or eat food  Use soap and water every time  Rub your soapy hands together, lacing your fingers  Wash the front and back of your hands, and in between your fingers  Use the fingers of one hand to scrub under the fingernails of the other hand  Wash for at least 20 seconds  Rinse with warm, running water for several seconds  Then dry your hands with a clean towel or paper towel  Use hand  that contains alcohol if soap and water are not available  Do not touch your eyes, nose, or mouth without washing your hands first          Cover a sneeze or cough  Use a tissue that covers your mouth and nose  Throw the tissue away in a trash can right away  Use the bend of your arm if a tissue is not available  Wash your hands well with soap and water or use a hand   Stay away from others while you are sick  Avoid crowds as much as possible  Ask about vaccines you may need  Talk to your healthcare provider about your vaccine history  He or she will tell you which vaccines you need, and when to get them  Get the influenza (flu) vaccine as soon as it is available  Flu viruses change, so it is important to get a flu vaccine every year  Get the pneumonia vaccine if recommended    This vaccine is usually recommended every 5 years  Your provider will tell you when to get this vaccine, if needed  Follow up with your doctor as directed:  Write down your questions so you remember to ask them during your visits  © Copyright [employer] 2022 Information is for End User's use only and may not be sold, redistributed or otherwise used for commercial purposes  All illustrations and images included in CareNotes® are the copyrighted property of [employer] A M , Inc  or [facility] [employer]   The above information is an  only  It is not intended as medical advice for individual conditions or treatments  Talk to your doctor, nurse or pharmacist before following any medical regimen to see if it is safe and effective for you

## 2022-12-08 NOTE — PROGRESS NOTES
Video Visit - Andrew Patel 40 y o  male MRN: [id number]    REQUIRED DOCUMENTATION:         1  This service was provided via [employer]  2  Provider located at [address] [place] [id number] [phone]  3  [employer] provider: Linwood Chahal PA-C   4  Identify all parties in room with patient during [facility] visit:  No one else  5  After connecting through [employer], patient was identified by name and date of birth  Patient was then informed that this was a Telemedicine visit and that the exam was being conducted confidentially over secure lines  My office door was closed  No one else was in the room  Patient acknowledged consent and understanding of privacy and security of the Telemedicine visit  I informed the patient that I have reviewed their record in Epic and presented the opportunity for them to ask any questions regarding the visit today  The patient agreed to participate  HPI  Patient states Monday night he came home from work and starting feeling really sick  mon-tues he had a dry throat, drained, cough, watery eyes, sinus congestion, sneezing  He denies any fevers, loss of taste or smell, SOB, CP, nausea, vomitng  His daughter had the flu last week  He is not vaccinated for covid or flu  He has not done a home covid test   He is drinking fluids and taking OTC aleve  Physical Exam  Constitutional:       General: He is not in acute distress  Appearance: Normal appearance  He is not ill-appearing, toxic-appearing or diaphoretic  HENT:      Nose: Congestion present  Comments: TTP over sinuses  Pulmonary:      Effort: Pulmonary effort is normal  No respiratory distress  Comments: Talking in complete sentences, no audible wheezing  Skin:     General: Skin is dry  Neurological:      General: No focal deficit present  Mental Status: He is alert and oriented to person, place, and time     Psychiatric:         Mood and Affect: Mood normal  Behavior: Behavior normal          Diagnoses and all orders for this visit:    Flu-like symptoms  -     benzonatate (TESSALON PERLES) 100 mg capsule; Take 1 capsule (100 mg total) by mouth 3 (three) times a day as needed for cough  -     fluticasone (FLONASE) 50 mcg/act nasal spray; 1 spray into each nostril daily    likely virall illness/flu  Pt would like to hold off on tamiflu and continue supportive care  Declined covid/flu testing  He will do a home covid test   Tessalon and Flonase as directed  Follow up with PCP  ER if worsen  Patient Instructions     Influenza   WHAT YOU NEED TO KNOW:   Influenza (the flu) is an infection caused by the influenza virus  The flu is easily spread when an infected person coughs, sneezes, or has close contact with others  You may be able to spread the flu to others for 1 week or longer after signs or symptoms appear  DISCHARGE INSTRUCTIONS:   Call your local emergency number (911 in the [address] [place] [address],[address]) if:   · You have trouble breathing, and your lips look purple or blue  · You have a seizure  Return to the emergency department if:   · You are dizzy, or you are urinating less or not at all  · You have a headache with a stiff neck, and you feel tired or confused  · You have new pain or pressure in your chest     · Your symptoms, such as shortness of breath, vomiting, or diarrhea, get worse  · Your symptoms, such as fever and coughing, seem to get better, but then get worse  Call your doctor if:   · You have new muscle pain or weakness  · You have questions or concerns about your condition or care  Medicines: You may need any of the following:  · Acetaminophen  decreases pain and fever  It is available without a doctor's order  Ask how much to take and how often to take it  Follow directions   Read the labels of all other medicines you are using to see if they also contain acetaminophen, or ask your doctor or pharmacist  Acetaminophen can cause liver damage if not taken correctly  Do not use more than 4 grams (4,000 milligrams) total of acetaminophen in one day  · NSAIDs , such as ibuprofen, help decrease swelling, pain, and fever  This medicine is available with or without a doctor's order  NSAIDs can cause stomach bleeding or kidney problems in certain people  If you take blood thinner medicine, always ask your healthcare provider if NSAIDs are safe for you  Always read the medicine label and follow directions  · Antivirals  help fight a viral infection  · Take your medicine as directed  Contact your healthcare provider if you think your medicine is not helping or if you have side effects  Tell him or her if you are allergic to any medicine  Keep a list of the medicines, vitamins, and herbs you take  Include the amounts, and when and why you take them  Bring the list or the pill bottles to follow-up visits  Carry your medicine list with you in case of an emergency  Rest  as much as you can to help you recover  Drink liquids as directed  to help prevent dehydration  Ask how much liquid to drink each day and which liquids are best for you  Prevent the spread of germs:       1  Wash your hands often  Wash your hands several times each day  Wash after you use the bathroom, change a child's diaper, and before you prepare or eat food  Use soap and water every time  Rub your soapy hands together, lacing your fingers  Wash the front and back of your hands, and in between your fingers  Use the fingers of one hand to scrub under the fingernails of the other hand  Wash for at least 20 seconds  Rinse with warm, running water for several seconds  Then dry your hands with a clean towel or paper towel  Use hand  that contains alcohol if soap and water are not available  Do not touch your eyes, nose, or mouth without washing your hands first          2  Cover a sneeze or cough  Use a tissue that covers your mouth and nose   Throw the tissue away in a trash can right away  Use the bend of your arm if a tissue is not available  Wash your hands well with soap and water or use a hand   3  Stay away from others while you are sick  Avoid crowds as much as possible  4  Ask about vaccines you may need  Talk to your healthcare provider about your vaccine history  He or she will tell you which vaccines you need, and when to get them  ? Get the influenza (flu) vaccine as soon as it is available  Flu viruses change, so it is important to get a flu vaccine every year  ? Get the pneumonia vaccine if recommended  This vaccine is usually recommended every 5 years  Your provider will tell you when to get this vaccine, if needed  Follow up with your doctor as directed:  Write down your questions so you remember to ask them during your visits  © Copyright [employer] 2022 Information is for End User's use only and may not be sold, redistributed or otherwise used for commercial purposes  All illustrations and images included in CareNotes® are the copyrighted property of [employer] A M , Inc  or [facility] [employer]   The above information is an  only  It is not intended as medical advice for individual conditions or treatments  Talk to your doctor, nurse or pharmacist before following any medical regimen to see if it is safe and effective for you

## 2022-12-08 NOTE — CARE ANYWHERE EVISITS
Visit Summary for Nathalia TINOCO - Gender: Male - Date of Birth: [id number]  Date: [id number] - Duration: 10 minutes  Patient: Nathalia TINOCO  Provider: Pk Ruffin PA-C    Patient Contact Information  Address  [address] [facility]  [address] [facility]  [phone]    Visit Topics  Cold [Added By: Self - 2022-12-08]  Flu-Like Symptoms [Added By: Self - 2022-12-08]  Headache [Added By: Self - 2022-12-08]    Triage Questions   What is your current physical address in the event of a medical emergency? Answer []  Are you allergic to any medications? Answer []  Are you now or could you be pregnant? Answer []  Do you have any immune system compromise or chronic lung   disease? Answer []  Do you have any vulnerable family members in the home (infant, pregnant, cancer, elderly)? Answer []     Conversation Transcripts  [0A][0A] [Notification] You are connected with Pk Ruffin PA-C, Urgent Care Specialist [0A][Notification] Toshia Carrasco is located in [place]  [0A][Notification] Toshia Carrasco has shared health history  Swapna Kinney  [0A]    Diagnosis  Other general symptoms and signs    Procedures  Value: [id number] Code: CPT-4 UNLISTED E&M SERVICE    Medications Prescribed    No prescriptions ordered    Electronically signed by: Alejandra Philip(NPI [id number])

## 2022-12-13 ENCOUNTER — AMB VIDEO VISIT (OUTPATIENT)
Dept: OTHER | Facility: HOSPITAL | Age: 42
End: 2022-12-13

## 2022-12-13 DIAGNOSIS — U07.1 COVID: Primary | ICD-10-CM

## 2022-12-13 PROBLEM — F33.9 DEPRESSION, RECURRENT (HCC): Status: RESOLVED | Noted: 2022-01-27 | Resolved: 2022-12-13

## 2022-12-13 NOTE — LETTER
December 13, 2022    Patient: Desire Washington  YOB: 1980  Date of Last Encounter: 12/13/2022    To whom it may concern:     Desire Washington has tested positive for COVID-19 (Coronavirus)  He may return to work on 12/15/22, which is 5 days from illness onset (provided symptoms are improving and 24 hours without fever ) Must mask for an additional 5 days      Sincerely,         Darnell Pablo PA-C

## 2022-12-13 NOTE — CARE ANYWHERE EVISITS
Visit Summary for Nathalia TINOCO - Gender: Male - Date of Birth: [id number]  Date: [id number] - Duration: 11 minutes  Patient: Nathalia TINOCO  Provider: Juliet Freeman PA-C    Patient Contact Information  Address  [address] [facility]  [address]; [facility]  [phone]    Visit Topics  Positive Covid-19 result yesterday afternoon using [employer] home kit [Added By: Self - 2022-12-13]    Triage Questions   What is your current physical address in the event of a medical emergency? Answer []  Are you allergic to any medications? Answer []  Are you now or could you be pregnant? Answer []  Do you have any immune system compromise or chronic lung   disease? Answer []  Do you have any vulnerable family members in the home (infant, pregnant, cancer, elderly)? Answer []     Conversation Transcripts  [0A][0A] [Notification] You are connected with Juliet Freeman PA-C, Urgent Care Specialist [0A][Notification] Toshia Carrasco is located in [place]  [0A][Notification] Toshia Carrasco has shared health history  Sawpna Kinney  [0A]    Diagnosis  COVID-19    Procedures  Value: [id number] Code: CPT-4 UNLISTED E&M SERVICE    Medications Prescribed    No prescriptions ordered    Electronically signed by: Mercedes Espinal(NPI [id number])

## 2022-12-13 NOTE — PROGRESS NOTES
Video Visit - Jose Luis Bautista 40 y o  male MRN: [id number]    REQUIRED DOCUMENTATION:         1  [facility]  2  Provider located at [address] [place] [id number] [phone]  3  [facility] provider: Prince Renée PA-C   4  Identify all parties in room with patient during [facility] visit:  No one else  5  After connecting through [employer], patient was identified by name and date of birth  Patient was then informed that this was a Telemedicine visit and that the exam was being conducted confidentially over secure lines  My office door was closed  No one else was in the room  Patient acknowledged consent and understanding of privacy and security of the Telemedicine visit  I informed the patient that I have reviewed their record in Epic and presented the opportunity for them to ask any questions regarding the visit today  The patient agreed to participate  VITALS: Heart Rate: 68 BPM, Respiratory Rate: 14 RPM, Temperature Unavailable° F, Blood Pressure Unavailable mmHg, Pulse Ox Unavailable % on RA    HPI  Pt reports he has been sick since last Monday  Had cough, body aches, feeling unwell  Was feeling better by Friday, but then started feeling worse with severe fatigue on Friday  Since then, having fevers, sweats, chills, slight cough  Yesterday took COVID test which was positive  Denies SOB or CP  Taking OTC tylenol cold with relief of sx  Wants to know when to return to work  Physical Exam  Constitutional:       General: He is not in acute distress  Appearance: Normal appearance  He is obese  He is not toxic-appearing  HENT:      Head: Normocephalic and atraumatic  Nose: No rhinorrhea  Comments: Sounds congested     Mouth/Throat:      Mouth: Mucous membranes are moist    Eyes:      Conjunctiva/sclera: Conjunctivae normal       Comments: glasses   Cardiovascular:      Rate and Rhythm: Normal rate     Pulmonary:      Effort: Pulmonary effort is normal  No respiratory distress  Breath sounds: No wheezing (no gross audible wheeze through computer)  Musculoskeletal:      Cervical back: Normal range of motion  Skin:     Findings: No rash (on face or neck)  Neurological:      Mental Status: He is alert  Cranial Nerves: No dysarthria or facial asymmetry  Psychiatric:         Mood and Affect: Mood normal          Behavior: Behavior normal        Unclear if COVID started Monday or Friday  Likely had covid/flu  Recommend isolating through tomorrow and fever free x 24 hours  Diagnoses and all orders for this visit:    COVID      Patient Instructions   Please Be Courteous:  You have COVID-19  Day 0 is your first day of symptoms  Day 1 is the first full day after your symptoms started  You should isolate yourself away from other through day 5 since this is when you are likely most infectious  This means go home and stay home  In Your Home:  If you live with other people, trying to avoid common spaces and disinfect areas that you come into contact with  Per the CDC's recommendations: Use a separate bedroom and bathroom if feasible  If If other people in your home are concerned they may have covid, isolation should begin even before seeking testing and before test results become available  All household members should start wearing a mask in the home, particularly in shared spaces where appropriate distancing is not possible  Take Care of Yourself:  Schedule a virtual visit with your primary care physician as soon as possible  Try to sleep on your stomach as much as possible  If you have the ability to, take vitamin D3 2000 IU daily, vitamin-C 1000 mg twice a day, a multivitamin daily to help boost your immune system  You should check your temperature twice day  Go to the emergency department for any severe shortness of breath, chest pain or pulse ox less than 90%      Isolation: Everyone, regardless of vaccination status: You may end isolation after day 5 if:  · You are fever-free for 24 hours (without the use of fever-reducing medication)    · Your symptoms are improving    If you still have fever or your other symptoms have not improved, continue to isolate until they improve  · If you had?moderate illness?(if you experienced shortness of breath or had difficulty breathing), or?severe illness?(you were hospitalized) due to COVID-19, or you have a weakened immune system, you need to isolate through day 10  · If you had?severe illness?or have a weakened immune system, consult your doctor before ending isolation  Ending isolation without a viral test may not be an option for you

## 2023-02-03 ENCOUNTER — OFFICE VISIT (OUTPATIENT)
Dept: FAMILY MEDICINE CLINIC | Facility: CLINIC | Age: 43
End: 2023-02-03

## 2023-02-03 VITALS
HEIGHT: 67 IN | DIASTOLIC BLOOD PRESSURE: 86 MMHG | HEART RATE: 83 BPM | OXYGEN SATURATION: 98 % | TEMPERATURE: 99.1 F | WEIGHT: 241 LBS | SYSTOLIC BLOOD PRESSURE: 132 MMHG | BODY MASS INDEX: 37.83 KG/M2

## 2023-02-03 DIAGNOSIS — E78.5 HYPERLIPIDEMIA, UNSPECIFIED HYPERLIPIDEMIA TYPE: ICD-10-CM

## 2023-02-03 DIAGNOSIS — F43.10 PTSD (POST-TRAUMATIC STRESS DISORDER): ICD-10-CM

## 2023-02-03 DIAGNOSIS — I10 ESSENTIAL HYPERTENSION: Primary | ICD-10-CM

## 2023-02-03 DIAGNOSIS — F33.1 MODERATE EPISODE OF RECURRENT MAJOR DEPRESSIVE DISORDER (HCC): ICD-10-CM

## 2023-02-03 DIAGNOSIS — F41.0 PANIC ATTACK: ICD-10-CM

## 2023-02-03 DIAGNOSIS — R42 EPISODIC LIGHTHEADEDNESS: ICD-10-CM

## 2023-02-03 DIAGNOSIS — F41.9 ANXIETY: ICD-10-CM

## 2023-02-03 RX ORDER — CLONIDINE HYDROCHLORIDE 0.1 MG/1
TABLET ORAL
COMMUNITY
Start: 2023-01-27

## 2023-02-03 NOTE — PROGRESS NOTES
Assessment/Plan:    Problem List Items Addressed This Visit        Cardiovascular and Mediastinum    Essential hypertension - Primary    Relevant Medications    cloNIDine (CATAPRES) 0 1 mg tablet    Other Relevant Orders    Comprehensive metabolic panel    CBC and differential       Other    PTSD (post-traumatic stress disorder)    Anxiety    Relevant Orders    TSH, 3rd generation with Free T4 reflex    Comprehensive metabolic panel    CBC and differential    Moderate episode of recurrent major depressive disorder (HCC)    Hyperlipidemia    Relevant Orders    Lipid panel   Other Visit Diagnoses     Panic attack        Episodic lightheadedness        Relevant Orders    TSH, 3rd generation with Free T4 reflex    Comprehensive metabolic panel    CBC and differential    HEMOGLOBIN A1C W/ EAG ESTIMATION           Diagnoses and all orders for this visit:    Essential hypertension  -     Comprehensive metabolic panel; Future  -     CBC and differential; Future    Hyperlipidemia, unspecified hyperlipidemia type  -     Lipid panel; Future    Moderate episode of recurrent major depressive disorder (HCC)    PTSD (post-traumatic stress disorder)    Anxiety  -     TSH, 3rd generation with Free T4 reflex; Future  -     Comprehensive metabolic panel; Future  -     CBC and differential; Future    Panic attack    Episodic lightheadedness  -     TSH, 3rd generation with Free T4 reflex; Future  -     Comprehensive metabolic panel; Future  -     CBC and differential; Future  -     HEMOGLOBIN A1C W/ EAG ESTIMATION; Future    Other orders  -     cloNIDine (CATAPRES) 0 1 mg tablet; take 1 tablet by mouth every morning and 1 tablet EVERY AFTERNOON      Patient will continue to follow with Soraya Holcomb for medication management of his psychiatric conditions  Will get routine labs and labs to evaluate episodic lightheadedness  Possibly related to weaning down off of psych medications      Subjective:      Patient ID: Gt Jarad is a 44 y o  male  Narcisa Power is a pleasant 44year old male who is here today accompanied by his wife for a follow-up  He is doing well  He started seeing a psychiatrist for his anxiety, depression, and PTSD  He is in the process of adjusting to the new medications  He has not started his clonidine yet  He was weaned down to 100 mg of zoloft because he felt that the 150 mg was too much  He is being weaned off of the buspar  He has been having random episodes of lightheadedness  It is usually first thing in the morning  His pressures have been well controlled  He wants to make sure he is not diabetic  The following portions of the patient's history were reviewed and updated as appropriate:   He has a past medical history of Blindness of left eye and Knee pain, right ,  does not have any pertinent problems on file  ,   has a past surgical history that includes Eye surgery; Neck surgery; Wrist surgery; Hand surgery; and FL injection right knee (arthrogram) (5/21/2019)  ,  family history includes Anxiety disorder in his mother; Depression in his mother; Diabetes in his mother; Hypertension in his mother  ,   reports that he has never smoked  He has never used smokeless tobacco  He reports that he does not drink alcohol and does not use drugs  ,  is allergic to amoxil [amoxicillin], ciprofloxacin, and penicillins     Current Outpatient Medications   Medication Sig Dispense Refill   • allopurinol (ZYLOPRIM) 100 mg tablet Take 2 tablets (200 mg total) by mouth daily 180 tablet 0   • amLODIPine (NORVASC) 5 mg tablet Take 1 tablet (5 mg total) by mouth daily 90 tablet 0   • brimonidine (ALPHAGAN P) 0 15 % ophthalmic solution 1 drop 3 (three) times a day     • cloNIDine (CATAPRES) 0 1 mg tablet take 1 tablet by mouth every morning and 1 tablet EVERY AFTERNOON     • colchicine (COLCRYS) 0 6 mg tablet Take 1 tablet (0 6 mg total) by mouth daily 90 tablet 0   • lisinopril (ZESTRIL) 20 mg tablet Take 1 tablet (20 mg total) by mouth daily 90 tablet 0   • prednisoLONE acetate (PRED FORTE) 1 % ophthalmic suspension 1 drop 4 (four) times a day     • sertraline (ZOLOFT) 100 mg tablet Take 1 tablet (100 mg total) by mouth daily 90 tablet 0   • dorzolamide (TRUSOPT) 2 % ophthalmic solution 1 drop 3 (three) times a day (Patient not taking: Reported on 2/3/2023)       No current facility-administered medications for this visit  Review of Systems   Constitutional: Negative for chills, diaphoresis, fatigue and fever  HENT: Negative for congestion, ear pain, postnasal drip, rhinorrhea, sneezing, sore throat and trouble swallowing  Eyes: Positive for visual disturbance (blindness of left eye)  Negative for pain  Respiratory: Negative for apnea, cough, shortness of breath and wheezing  Cardiovascular: Negative for chest pain and palpitations  Gastrointestinal: Negative for abdominal pain, constipation, diarrhea, nausea and vomiting  Genitourinary: Negative for dysuria  Musculoskeletal: Negative for arthralgias, gait problem and myalgias  Neurological: Positive for light-headedness (episodic)  Negative for dizziness, syncope, weakness, numbness and headaches  Psychiatric/Behavioral: Positive for dysphoric mood  Negative for suicidal ideas  The patient is nervous/anxious  Objective:  Vitals:    02/03/23 1426   BP: 132/86   BP Location: Left arm   Patient Position: Sitting   Cuff Size: Standard   Pulse: 83   Temp: 99 1 °F (37 3 °C)   TempSrc: Temporal   SpO2: 98%   Weight: 109 kg (241 lb)   Height: 5' 7" (1 702 m)     Body mass index is 37 75 kg/m²  Physical Exam  Vitals and nursing note reviewed  Constitutional:       Appearance: He is well-developed  HENT:      Head: Normocephalic and atraumatic  Right Ear: External ear normal       Left Ear: External ear normal       Nose: Nose normal    Eyes:      Extraocular Movements: Extraocular movements intact     Cardiovascular:      Rate and Rhythm: Normal rate and regular rhythm  Heart sounds: Normal heart sounds  No murmur heard  No friction rub  No gallop  Pulmonary:      Effort: Pulmonary effort is normal  No respiratory distress  Breath sounds: Normal breath sounds  No wheezing or rales  Musculoskeletal:         General: Normal range of motion  Cervical back: Normal range of motion and neck supple  Right lower leg: No edema  Left lower leg: No edema  Lymphadenopathy:      Cervical: No cervical adenopathy  Skin:     General: Skin is warm and dry  Neurological:      Mental Status: He is alert and oriented to person, place, and time  Psychiatric:         Behavior: Behavior normal          Thought Content:  Thought content normal          Judgment: Judgment normal

## 2023-02-07 ENCOUNTER — APPOINTMENT (OUTPATIENT)
Dept: LAB | Facility: CLINIC | Age: 43
End: 2023-02-07

## 2023-02-07 DIAGNOSIS — F41.9 ANXIETY: ICD-10-CM

## 2023-02-07 DIAGNOSIS — I10 ESSENTIAL HYPERTENSION: ICD-10-CM

## 2023-02-07 DIAGNOSIS — R42 EPISODIC LIGHTHEADEDNESS: ICD-10-CM

## 2023-02-07 DIAGNOSIS — E78.5 HYPERLIPIDEMIA, UNSPECIFIED HYPERLIPIDEMIA TYPE: ICD-10-CM

## 2023-02-07 LAB
ALBUMIN SERPL BCP-MCNC: 4.6 G/DL (ref 3.5–5)
ALP SERPL-CCNC: 77 U/L (ref 46–116)
ALT SERPL W P-5'-P-CCNC: 42 U/L (ref 12–78)
ANION GAP SERPL CALCULATED.3IONS-SCNC: 4 MMOL/L (ref 4–13)
AST SERPL W P-5'-P-CCNC: 24 U/L (ref 5–45)
BASOPHILS # BLD AUTO: 0.04 THOUSANDS/ÂΜL (ref 0–0.1)
BASOPHILS NFR BLD AUTO: 1 % (ref 0–1)
BILIRUB SERPL-MCNC: 0.6 MG/DL (ref 0.2–1)
BUN SERPL-MCNC: 13 MG/DL (ref 5–25)
CALCIUM SERPL-MCNC: 9.8 MG/DL (ref 8.3–10.1)
CHLORIDE SERPL-SCNC: 106 MMOL/L (ref 96–108)
CHOLEST SERPL-MCNC: 201 MG/DL
CO2 SERPL-SCNC: 28 MMOL/L (ref 21–32)
CREAT SERPL-MCNC: 1.05 MG/DL (ref 0.6–1.3)
EOSINOPHIL # BLD AUTO: 0.09 THOUSAND/ÂΜL (ref 0–0.61)
EOSINOPHIL NFR BLD AUTO: 1 % (ref 0–6)
ERYTHROCYTE [DISTWIDTH] IN BLOOD BY AUTOMATED COUNT: 12.8 % (ref 11.6–15.1)
GFR SERPL CREATININE-BSD FRML MDRD: 87 ML/MIN/1.73SQ M
GLUCOSE P FAST SERPL-MCNC: 95 MG/DL (ref 65–99)
HCT VFR BLD AUTO: 45.4 % (ref 36.5–49.3)
HDLC SERPL-MCNC: 38 MG/DL
HGB BLD-MCNC: 15.6 G/DL (ref 12–17)
IMM GRANULOCYTES # BLD AUTO: 0.03 THOUSAND/UL (ref 0–0.2)
IMM GRANULOCYTES NFR BLD AUTO: 0 % (ref 0–2)
LDLC SERPL CALC-MCNC: 115 MG/DL (ref 0–100)
LYMPHOCYTES # BLD AUTO: 2.21 THOUSANDS/ÂΜL (ref 0.6–4.47)
LYMPHOCYTES NFR BLD AUTO: 28 % (ref 14–44)
MCH RBC QN AUTO: 29.7 PG (ref 26.8–34.3)
MCHC RBC AUTO-ENTMCNC: 34.4 G/DL (ref 31.4–37.4)
MCV RBC AUTO: 87 FL (ref 82–98)
MONOCYTES # BLD AUTO: 0.64 THOUSAND/ÂΜL (ref 0.17–1.22)
MONOCYTES NFR BLD AUTO: 8 % (ref 4–12)
NEUTROPHILS # BLD AUTO: 4.78 THOUSANDS/ÂΜL (ref 1.85–7.62)
NEUTS SEG NFR BLD AUTO: 62 % (ref 43–75)
NONHDLC SERPL-MCNC: 163 MG/DL
NRBC BLD AUTO-RTO: 0 /100 WBCS
PLATELET # BLD AUTO: 258 THOUSANDS/UL (ref 149–390)
PMV BLD AUTO: 10.5 FL (ref 8.9–12.7)
POTASSIUM SERPL-SCNC: 4.3 MMOL/L (ref 3.5–5.3)
PROT SERPL-MCNC: 7.6 G/DL (ref 6.4–8.4)
RBC # BLD AUTO: 5.25 MILLION/UL (ref 3.88–5.62)
SODIUM SERPL-SCNC: 138 MMOL/L (ref 135–147)
TRIGL SERPL-MCNC: 239 MG/DL
TSH SERPL DL<=0.05 MIU/L-ACNC: 1.93 UIU/ML (ref 0.45–4.5)
WBC # BLD AUTO: 7.79 THOUSAND/UL (ref 4.31–10.16)

## 2023-02-08 LAB
EST. AVERAGE GLUCOSE BLD GHB EST-MCNC: 105 MG/DL
HBA1C MFR BLD: 5.3 %

## 2023-02-28 DIAGNOSIS — M10.9 GOUT, UNSPECIFIED CAUSE, UNSPECIFIED CHRONICITY, UNSPECIFIED SITE: ICD-10-CM

## 2023-02-28 DIAGNOSIS — I10 ESSENTIAL HYPERTENSION: ICD-10-CM

## 2023-02-28 RX ORDER — AMLODIPINE BESYLATE 5 MG/1
5 TABLET ORAL DAILY
Qty: 90 TABLET | Refills: 0 | Status: SHIPPED | OUTPATIENT
Start: 2023-02-28

## 2023-02-28 RX ORDER — ALLOPURINOL 100 MG/1
TABLET ORAL
Qty: 180 TABLET | Refills: 0 | Status: SHIPPED | OUTPATIENT
Start: 2023-02-28

## 2023-03-07 DIAGNOSIS — F43.10 PTSD (POST-TRAUMATIC STRESS DISORDER): ICD-10-CM

## 2023-03-07 DIAGNOSIS — F41.9 ANXIETY: ICD-10-CM

## 2023-03-07 DIAGNOSIS — F33.1 MODERATE EPISODE OF RECURRENT MAJOR DEPRESSIVE DISORDER (HCC): ICD-10-CM

## 2023-03-07 RX ORDER — SERTRALINE HYDROCHLORIDE 100 MG/1
TABLET, FILM COATED ORAL
Qty: 90 TABLET | Refills: 0 | Status: SHIPPED | OUTPATIENT
Start: 2023-03-07

## 2023-04-02 DIAGNOSIS — M10.9 GOUT, UNSPECIFIED CAUSE, UNSPECIFIED CHRONICITY, UNSPECIFIED SITE: ICD-10-CM

## 2023-04-03 RX ORDER — COLCHICINE 0.6 MG/1
TABLET ORAL
Qty: 90 TABLET | Refills: 0 | Status: SHIPPED | OUTPATIENT
Start: 2023-04-03

## 2023-04-03 RX ORDER — COLCHICINE 0.6 MG/1
0.6 TABLET ORAL DAILY
Qty: 90 TABLET | Refills: 0 | Status: SHIPPED | OUTPATIENT
Start: 2023-04-03

## 2023-05-08 ENCOUNTER — AMB VIDEO VISIT (OUTPATIENT)
Dept: OTHER | Facility: HOSPITAL | Age: 43
End: 2023-05-08

## 2023-05-08 DIAGNOSIS — F41.9 ANXIETY: Primary | ICD-10-CM
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
## 2023-05-08 NOTE — PATIENT INSTRUCTIONS
Will give you a work note  As we discussed call your HR and see if they have a EAP  I would also recommend you reestablish with your psychiatrist   Follow up with PCP as discussed  As we discussed if symptoms become worse, your not eating or drinking, sleeping all the time, thoughts of self harm or harming someone else go directly to ER  Anxiety   WHAT YOU NEED TO KNOW:   Anxiety is a condition that causes you to feel extremely worried or nervous  The feelings are so strong that they can cause problems with your daily activities or sleep  Anxiety may be triggered by something you fear, or it may happen without a cause  Family or work stress, smoking, caffeine, and alcohol can increase your risk for anxiety  Certain medicines or health conditions can also increase your risk  Anxiety can become a long-term condition if it is not managed or treated  DISCHARGE INSTRUCTIONS:   Call your local emergency number (911 in the [address] [facility]) if:   You have chest pain, tightness, or heaviness that may spread to your shoulders, arms, jaw, neck, or back  You feel like hurting yourself or someone else  Call your doctor if:   Your symptoms get worse or do not get better with treatment  Your anxiety keeps you from doing your regular daily activities  You have new symptoms since your last visit  You have questions or concerns about your condition or care  Medicines:   Medicines  may be given to help you feel more calm and relaxed, and decrease your symptoms  Take your medicine as directed  Contact your healthcare provider if you think your medicine is not helping or if you have side effects  Tell your provider if you are allergic to any medicine  Keep a list of the medicines, vitamins, and herbs you take  Include the amounts, and when and why you take them  Bring the list or the pill bottles to follow-up visits  Carry your medicine list with you in case of an emergency      Manage anxiety:   Talk to someone about your anxiety  Your healthcare provider may suggest counseling  Cognitive behavioral therapy can help you understand and change how you react to events that trigger your symptoms  You might feel more comfortable talking with a friend or family member about your anxiety  Choose someone you know will be supportive and encouraging  Find ways to relax  Activities such as exercise, meditation, or listening to music can help you relax  Spend time with friends, or do things you enjoy  Practice deep breathing  Deep breathing can help you relax when you feel anxious  Focus on taking slow, deep breaths several times a day, or during an anxiety attack  Breathe in through your nose and out through your mouth  Create a regular sleep routine  Regular sleep can help you feel calmer during the day  Go to sleep and wake up at the same times every day  Do not watch television or use the computer right before bed  Your room should be comfortable, dark, and quiet  Eat a variety of healthy foods  Healthy foods include fruits, vegetables, low-fat dairy products, lean meats, fish, whole-grain breads, and cooked beans  Healthy foods can help you feel less anxious and have more energy  Exercise regularly  Exercise can increase your energy level  Exercise may also lift your mood and help you sleep better  Your healthcare provider can help you create an exercise plan  Do not smoke  Nicotine and other chemicals in cigarettes and cigars can increase anxiety  Ask your healthcare provider for information if you currently smoke and need help to quit  E-cigarettes or smokeless tobacco still contain nicotine  Talk to your healthcare provider before you use these products  Do not have caffeine  Caffeine can make your symptoms worse  Do not have foods or drinks that are meant to increase your energy level  Limit or do not drink alcohol  Ask your healthcare provider if alcohol is safe for you   You may not be able to drink alcohol if you take certain anxiety or depression medicines  Limit alcohol to 1 drink per day if you are a woman  Limit alcohol to 2 drinks per day if you are a man  A drink of alcohol is 12 ounces of beer, 5 ounces of wine, or 1½ ounces of liquor  Do not use drugs  Drugs can make your anxiety worse  It can also make anxiety hard to manage  Talk to your healthcare provider if you use drugs and want help to quit  Follow up with your doctor within 2 weeks or as directed:  Write down your questions so you remember to ask them during your visits  © [facility] 2022 Information is for End User's use only and may not be sold, redistributed or otherwise used for commercial purposes  The above information is an  only  It is not intended as medical advice for individual conditions or treatments  Talk to your doctor, nurse or pharmacist before following any medical regimen to see if it is safe and effective for you
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
## 2023-05-08 NOTE — LETTER
[facility] VISIT VIR  [address] [place] [address]    May 8, 2023     Patient: Shruthi Diana   YOB: 1980   Date of Visit: 5/8/2023       To Whom it May Concern:    Shruthi Diana is under my professional care  He was seen virtually on 5/8/2023  He may return to work on 05/10/2023  If you have any questions or concerns, please don't hesitate to call           Sincerely,          ARIEL Phillips        CC: No Recipients

## 2023-05-19 DIAGNOSIS — I10 ESSENTIAL HYPERTENSION: ICD-10-CM

## 2023-05-19 RX ORDER — LISINOPRIL 20 MG/1
TABLET ORAL
Qty: 90 TABLET | Refills: 0 | Status: SHIPPED | OUTPATIENT
Start: 2023-05-19

## 2023-06-10 DIAGNOSIS — M10.9 GOUT, UNSPECIFIED CAUSE, UNSPECIFIED CHRONICITY, UNSPECIFIED SITE: ICD-10-CM

## 2023-06-10 DIAGNOSIS — I10 ESSENTIAL HYPERTENSION: ICD-10-CM

## 2023-06-12 RX ORDER — ALLOPURINOL 100 MG/1
TABLET ORAL
Qty: 180 TABLET | Refills: 0 | Status: SHIPPED | OUTPATIENT
Start: 2023-06-12

## 2023-06-12 RX ORDER — AMLODIPINE BESYLATE 5 MG/1
5 TABLET ORAL DAILY
Qty: 90 TABLET | Refills: 0 | Status: SHIPPED | OUTPATIENT
Start: 2023-06-12

## 2023-07-03 DIAGNOSIS — M10.9 GOUT, UNSPECIFIED CAUSE, UNSPECIFIED CHRONICITY, UNSPECIFIED SITE: ICD-10-CM

## 2023-07-04 RX ORDER — COLCHICINE 0.6 MG/1
TABLET ORAL
Qty: 90 TABLET | Refills: 0 | Status: SHIPPED | OUTPATIENT
Start: 2023-07-04

## 2023-07-25 ENCOUNTER — AMB VIDEO VISIT (OUTPATIENT)
Dept: OTHER | Facility: HOSPITAL | Age: 43
End: 2023-07-25
Payer: COMMERCIAL

## 2023-07-25 DIAGNOSIS — R11.0 NAUSEA: ICD-10-CM

## 2023-07-25 DIAGNOSIS — H57.12 PAIN OF LEFT EYE: Primary | ICD-10-CM

## 2023-07-25 PROCEDURE — 99212 OFFICE O/P EST SF 10 MIN: CPT | Performed by: PHYSICIAN ASSISTANT

## 2023-07-25 RX ORDER — ONDANSETRON 4 MG/1
4 TABLET, FILM COATED ORAL EVERY 8 HOURS PRN
Qty: 20 TABLET | Refills: 0 | Status: SHIPPED | OUTPATIENT
Start: 2023-07-25

## 2023-07-25 RX ORDER — FAMOTIDINE 20 MG/1
20 TABLET, FILM COATED ORAL 2 TIMES DAILY
Qty: 10 TABLET | Refills: 0 | Status: SHIPPED | OUTPATIENT
Start: 2023-07-25
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
## 2023-07-25 NOTE — PATIENT INSTRUCTIONS
For worsening pain, go to [facility] in [address] Phone number is [phone] if you need assistance or have further questions      Acute Nausea and Vomiting   WHAT YOU NEED TO KNOW:   Acute means the nausea and vomiting starts suddenly, gets worse quickly, and lasts a short time. There are many possible causes of acute nausea and vomiting. DISCHARGE INSTRUCTIONS:   Call your local emergency number (911 in the [address]) if:   You have chest pain. You have severe pain or cramping in your abdomen. Your vision is blurred. You are confused, have a high fever, or a stiff neck. You have bright red blood coming from your rectum. Your vomit smells like bowel movement. Return to the emergency department if:   You have a severe headache or pain. You are dizzy, cold, and thirsty, and your eyes and mouth are dry. You are urinating very little or not at all. You are dizzy or lightheaded when you stand up. You see blood or material that looks like coffee grounds in your vomit. Call your doctor if:   You continue to vomit for more than 48 hours. Your nausea and vomiting does not get better or go away after you use medicine. You have questions or concerns about your condition or care. Medicines: You may need any of the following:  Medicines  may be given to calm your stomach and stop your vomiting. You may also need medicines to help empty your stomach and bowels. Take your medicine as directed. Contact your healthcare provider if you think your medicine is not helping or if you have side effects. Tell your provider if you are allergic to any medicine. Keep a list of the medicines, vitamins, and herbs you take. Include the amounts, and when and why you take them. Bring the list or the pill bottles to follow-up visits. Carry your medicine list with you in case of an emergency. Manage your symptoms:   Rest as much as you can.   Too much activity can make your nausea worse.    Drink more liquids to prevent dehydration. Take small sips. Try drinks such as ginger ale, lemonade, water, or tea. Your provider may recommend that you drink an oral rehydration solution (ORS). ORS contains water, salts, and sugar that are needed to replace the lost body fluids. Eat smaller meals, more often. Try bland foods and avoid spices or strong flavors    Do not drink alcohol. Alcohol may upset or irritate your stomach. Follow up with your doctor as directed:  Write down your questions so you remember to ask them during your visits. © Copyright Mark Khoury 2022 Information is for End User's use only and may not be sold, redistributed or otherwise used for commercial purposes. The above information is an  only. It is not intended as medical advice for individual conditions or treatments. Talk to your doctor, nurse or pharmacist before following any medical regimen to see if it is safe and effective for you.
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
## 2023-07-25 NOTE — LETTER
July 25, 2023     Patient: Macie Mitchell   YOB: 1980   Date of Visit: 7/25/2023       To Whom it May Concern:    Macie Mitchell is under my professional care. He was seen virtually on 7/25/2023. He may return to work on 7/26/23 . If you have any questions or concerns, please don't hesitate to call.          Sincerely,          Sherren Fast, PA-C        CC: No Recipients

## 2023-08-26 DIAGNOSIS — I10 ESSENTIAL HYPERTENSION: ICD-10-CM

## 2023-08-26 DIAGNOSIS — M10.9 GOUT, UNSPECIFIED CAUSE, UNSPECIFIED CHRONICITY, UNSPECIFIED SITE: ICD-10-CM

## 2023-08-28 RX ORDER — ALLOPURINOL 100 MG/1
TABLET ORAL
Qty: 180 TABLET | Refills: 0 | Status: SHIPPED | OUTPATIENT
Start: 2023-08-28

## 2023-08-28 RX ORDER — AMLODIPINE BESYLATE 5 MG/1
5 TABLET ORAL DAILY
Qty: 90 TABLET | Refills: 0 | Status: SHIPPED | OUTPATIENT
Start: 2023-08-28

## 2023-08-28 RX ORDER — LISINOPRIL 20 MG/1
TABLET ORAL
Qty: 90 TABLET | Refills: 0 | Status: SHIPPED | OUTPATIENT
Start: 2023-08-28

## 2023-09-25 ENCOUNTER — HOSPITAL ENCOUNTER (EMERGENCY)
Facility: HOSPITAL | Age: 43
Discharge: HOME | End: 2023-09-25
Attending: EMERGENCY MEDICINE
Payer: OTHER MISCELLANEOUS

## 2023-09-25 ENCOUNTER — APPOINTMENT (EMERGENCY)
Dept: RADIOLOGY | Facility: HOSPITAL | Age: 43
End: 2023-09-25
Payer: OTHER MISCELLANEOUS

## 2023-09-25 VITALS
HEIGHT: 67 IN | RESPIRATION RATE: 18 BRPM | OXYGEN SATURATION: 99 % | BODY MASS INDEX: 38.45 KG/M2 | SYSTOLIC BLOOD PRESSURE: 160 MMHG | DIASTOLIC BLOOD PRESSURE: 104 MMHG | TEMPERATURE: 98.4 F | HEART RATE: 103 BPM | WEIGHT: 245 LBS

## 2023-09-25 DIAGNOSIS — M10.9 GOUT, UNSPECIFIED CAUSE, UNSPECIFIED CHRONICITY, UNSPECIFIED SITE: ICD-10-CM

## 2023-09-25 DIAGNOSIS — T14.8XXA HEMATOMA: ICD-10-CM

## 2023-09-25 DIAGNOSIS — S80.11XA CONTUSION OF RIGHT LOWER EXTREMITY, INITIAL ENCOUNTER: Primary | ICD-10-CM

## 2023-09-25 PROCEDURE — 99283 EMERGENCY DEPT VISIT LOW MDM: CPT

## 2023-09-25 PROCEDURE — 99284 EMERGENCY DEPT VISIT MOD MDM: CPT | Performed by: EMERGENCY MEDICINE

## 2023-09-25 PROCEDURE — 73590 X-RAY EXAM OF LOWER LEG: CPT

## 2023-09-25 RX ORDER — COLCHICINE 0.6 MG/1
TABLET ORAL
Qty: 90 TABLET | Refills: 0 | Status: SHIPPED | OUTPATIENT
Start: 2023-09-25

## 2023-09-25 NOTE — ED PROVIDER NOTES
History  Chief Complaint   Patient presents with   • Leg Injury     Patient reports he struck himself in the right leg with a hammer while putting a stake in the ground. Large hematoma to right leg. Patient is a 72-year-old male presenting to the emergency department today complaining of hematoma to his right lower leg, patient was using a hammer to pound a stake into the ground, he slipped and accidentally hit the anterior portion of his right lower leg, he was able to continue ambulating and continue working, however noted large area of swelling over the area injured, and now he reports a slight pins and needle sensation in the area as well, he does not take a blood thinner, denies pain or injury elsewhere          Prior to Admission Medications   Prescriptions Last Dose Informant Patient Reported? Taking?   allopurinol (ZYLOPRIM) 100 mg tablet   No Yes   Sig: TAKE 2 TABLETS BY MOUTH EVERY DAY   amLODIPine (NORVASC) 5 mg tablet   No Yes   Sig: TAKE 1 TABLET (5 MG TOTAL) BY MOUTH DAILY.    brimonidine (ALPHAGAN P) 0.15 % ophthalmic solution  Self Yes Yes   Si drop 3 (three) times a day   cloNIDine (CATAPRES) 0.1 mg tablet   Yes Yes   Sig: take 1 tablet by mouth every morning and 1 tablet EVERY AFTERNOON   colchicine (COLCRYS) 0.6 mg tablet   No Yes   Sig: TAKE 1 TABLET BY MOUTH EVERY DAY   colchicine (COLCRYS) 0.6 mg tablet   No Yes   Sig: TAKE 1 TABLET BY MOUTH EVERY DAY   dorzolamide (TRUSOPT) 2 % ophthalmic solution Not Taking Self Yes No   Si drop 3 (three) times a day   Patient not taking: Reported on 2/3/2023   famotidine (PEPCID) 20 mg tablet   No Yes   Sig: Take 1 tablet (20 mg total) by mouth 2 (two) times a day   lisinopril (ZESTRIL) 20 mg tablet   No Yes   Sig: TAKE 1 TABLET BY MOUTH EVERY DAY   ondansetron (ZOFRAN) 4 mg tablet   No Yes   Sig: Take 1 tablet (4 mg total) by mouth every 8 (eight) hours as needed for nausea or vomiting   prednisoLONE acetate (PRED FORTE) 1 % ophthalmic suspension  Self Yes Yes   Si drop 4 (four) times a day   sertraline (ZOLOFT) 100 mg tablet   No Yes   Sig: TAKE 1 TABLET BY MOUTH EVERY DAY      Facility-Administered Medications: None       Past Medical History:   Diagnosis Date   • Anxiety    • Blindness of left eye    • Knee pain, right        Past Surgical History:   Procedure Laterality Date   • EYE SURGERY     • FL INJECTION RIGHT KNEE (ARTHROGRAM)  2019   • HAND SURGERY     • NECK SURGERY     • WRIST SURGERY         Family History   Problem Relation Age of Onset   • Diabetes Mother    • Hypertension Mother    • Anxiety disorder Mother    • Depression Mother      I have reviewed and agree with the history as documented. E-Cigarette/Vaping   • E-Cigarette Use Never User      E-Cigarette/Vaping Substances   • Nicotine No    • THC No    • CBD No    • Flavoring No    • Other No    • Unknown No      Social History     Tobacco Use   • Smoking status: Never   • Smokeless tobacco: Never   Vaping Use   • Vaping Use: Never used   Substance Use Topics   • Alcohol use: Never   • Drug use: Never       Review of Systems   Constitutional: Negative. HENT: Negative. Eyes: Negative. Respiratory: Negative. Cardiovascular: Negative. Gastrointestinal: Negative. Endocrine: Negative. Genitourinary: Negative. Musculoskeletal: Negative. Skin: Positive for wound. Allergic/Immunologic: Negative. Neurological: Negative. Hematological: Negative. Psychiatric/Behavioral: Negative. Physical Exam  Physical Exam  Constitutional:       Appearance: He is well-developed. HENT:      Head: Normocephalic and atraumatic. Eyes:      Conjunctiva/sclera: Conjunctivae normal.      Pupils: Pupils are equal, round, and reactive to light. Cardiovascular:      Rate and Rhythm: Normal rate. Pulmonary:      Effort: Pulmonary effort is normal.   Abdominal:      Palpations: Abdomen is soft.    Musculoskeletal:         General: Normal range of motion. Cervical back: Normal range of motion and neck supple. Legs:       Comments: 6 cm hematoma to the right lower extremity anteriorly, distal to patella, mild tenderness to palpate, no calf tenderness, distal sensation and motor is intact   Skin:     General: Skin is warm and dry. Neurological:      Mental Status: He is alert and oriented to person, place, and time. Vital Signs  ED Triage Vitals [09/25/23 1145]   Temperature Pulse Respirations Blood Pressure SpO2   98.4 °F (36.9 °C) 103 18 (!) 160/104 99 %      Temp Source Heart Rate Source Patient Position - Orthostatic VS BP Location FiO2 (%)   Temporal Monitor Sitting Left arm --      Pain Score       3           Vitals:    09/25/23 1145   BP: (!) 160/104   Pulse: 103   Patient Position - Orthostatic VS: Sitting         Visual Acuity      ED Medications  Medications - No data to display    Diagnostic Studies  Results Reviewed     None                 XR tibia fibula 2 views RIGHT   ED Interpretation by Mariana Reeves DO (09/25 1225)   No acute findings                 Procedures  Procedures         ED Course                               SBIRT 22yo+    Flowsheet Row Most Recent Value   Initial Alcohol Screen: US AUDIT-C     1. How often do you have a drink containing alcohol? 0 Filed at: 09/25/2023 1147   2. How many drinks containing alcohol do you have on a typical day you are drinking? 0 Filed at: 09/25/2023 1147   3a. Male UNDER 65: How often do you have five or more drinks on one occasion? 0 Filed at: 09/25/2023 1147   Audit-C Score 0 Filed at: 09/25/2023 1147   RODY: How many times in the past year have you. .. Used an illegal drug or used a prescription medication for non-medical reasons? Never Filed at: 09/25/2023 1147                    Medical Decision Making   Patient presents with hematoma to the right lower extremity. Given history, exam and work-up, patient likely has uncomplicated hematoma.   I have low suspicion for fracture, dislocation, significant ligamentous injury, septic arthritis, gout flare, new autoimmune arthropathy or gonococcal arthropathy. Contusion of right lower extremity, initial encounter: acute illness or injury  Hematoma: acute illness or injury  Amount and/or Complexity of Data Reviewed  Radiology: ordered and independent interpretation performed. Decision-making details documented in ED Course. Risk  OTC drugs. Disposition  Final diagnoses:   Contusion of right lower extremity, initial encounter   Hematoma     Time reflects when diagnosis was documented in both MDM as applicable and the Disposition within this note     Time User Action Codes Description Comment    9/25/2023 12:22 PM Cady Alicia Add [S80.11XA] Contusion of right lower extremity, initial encounter     9/25/2023 12:22 PM Benny Ramos [facility] Renetta Jeannette Juli Dumaspoppy. 8XXA] Hematoma       ED Disposition     ED Disposition   Discharge    Condition   Stable    Date/Time   Mon Sep 25, 2023 12:22 PM    Comment   Fredrick Marrero discharge to home/self care. Follow-up Information     Follow up With Specialties Details Why Contact Info    [facility], Physician Assistant   [address] [address]  [phone]            Patient's Medications   Discharge Prescriptions    No medications on file       No discharge procedures on file.     PDMP Review     None          ED Provider  Electronically Signed by           Cady Alicia DO  09/25/23 7685

## 2023-10-18 ENCOUNTER — AMB VIDEO VISIT (OUTPATIENT)
Dept: OTHER | Facility: HOSPITAL | Age: 43
End: 2023-10-18

## 2023-10-18 ENCOUNTER — AMB VIDEO VISIT (OUTPATIENT)
Dept: OTHER | Facility: HOSPITAL | Age: 43
End: 2023-10-18
Payer: COMMERCIAL

## 2023-10-18 DIAGNOSIS — H92.01 OTALGIA, RIGHT EAR: Primary | ICD-10-CM

## 2023-10-18 DIAGNOSIS — H69.91 DYSFUNCTION OF RIGHT EUSTACHIAN TUBE: Primary | ICD-10-CM

## 2023-10-18 DIAGNOSIS — R50.9 SUBJECTIVE FEVER: ICD-10-CM

## 2023-10-18 PROCEDURE — 99212 OFFICE O/P EST SF 10 MIN: CPT | Performed by: PHYSICIAN ASSISTANT

## 2023-10-18 RX ORDER — CEFDINIR 300 MG/1
300 CAPSULE ORAL EVERY 12 HOURS SCHEDULED
Qty: 14 CAPSULE | Refills: 0 | Status: SHIPPED | OUTPATIENT
Start: 2023-10-18 | End: 2023-10-25

## 2023-10-18 RX ORDER — FLUTICASONE PROPIONATE 50 MCG
1 SPRAY, SUSPENSION (ML) NASAL DAILY
Qty: 9.9 ML | Refills: 0 | Status: SHIPPED | OUTPATIENT
Start: 2023-10-18

## 2023-10-18 RX ORDER — OXYMETAZOLINE HYDROCHLORIDE 0.05 G/100ML
2 SPRAY NASAL
Qty: 30 ML | Refills: 0 | Status: SHIPPED | OUTPATIENT
Start: 2023-10-18 | End: 2023-10-21

## 2023-10-18 RX ORDER — CETIRIZINE HYDROCHLORIDE, PSEUDOEPHEDRINE HYDROCHLORIDE 5; 120 MG/1; MG/1
1 TABLET, FILM COATED, EXTENDED RELEASE ORAL 2 TIMES DAILY
Qty: 20 TABLET | Refills: 0 | Status: SHIPPED | OUTPATIENT
Start: 2023-10-18

## 2023-10-18 NOTE — PROGRESS NOTES
Video Visit - Wilfrido Cousin 44 y.o. male MRN: [id number]    REQUIRED DOCUMENTATION:         1. This service was provided via [employer]. 2. Provider located at [address] [place] [id number] [phone].  3. [facility] provider: Jules Hillman PA-C.  4. Identify all parties in room with patient during [facility] visit:  No one else  5. After connecting through [employer], patient was identified by name and date of birth. Patient was then informed that this was a Telemedicine visit and that the exam was being conducted confidentially over secure lines. My office door was closed. No one else was in the room. Patient acknowledged consent and understanding of privacy and security of the Telemedicine visit. I informed the patient that I have reviewed their record in Epic and presented the opportunity for them to ask any questions regarding the visit today. The patient agreed to participate. VITALS: Heart Rate: 76 BPM, Respiratory Rate: 14 RPM, Temperature  unavailable ° F, Blood Pressure Unavailable mmHg, Pulse Ox Unavailable % on RA    HPI  Reports since being seen this morning, developed sweats, feeling hot and ear pain worsened. No thermometer to check temp. Denies drainage from ear. Took 2 aleve without relief. Pharmacy didn't have his rxs ready from when I saw him earlier for tx of ETD. Physical Exam  Constitutional:       General: He is not in acute distress. Appearance: Normal appearance. He is overweight. He is ill-appearing (mild) and diaphoretic (mild). He is not toxic-appearing. HENT:      Head: Normocephalic and atraumatic. Right Ear: External ear normal.      Ears:      Comments: No swelling redness or ttp to mastoid     Nose: No rhinorrhea. Mouth/Throat:      Mouth: Mucous membranes are moist.   Eyes:      Conjunctiva/sclera: Conjunctivae normal.      Comments: glasses   Cardiovascular:      Rate and Rhythm: Normal rate.    Pulmonary: Effort: Pulmonary effort is normal. No respiratory distress. Breath sounds: No wheezing (no gross audible wheeze through computer). Musculoskeletal:      Cervical back: Normal range of motion. Skin:     Findings: No rash (on face or neck). Neurological:      Mental Status: He is alert. Cranial Nerves: No dysarthria or facial asymmetry. Psychiatric:         Mood and Affect: Mood normal.         Behavior: Behavior normal.         Diagnoses and all orders for this visit:    Otalgia, right ear  -     cefdinir (OMNICEF) 300 mg capsule; Take 1 capsule (300 mg total) by mouth every 12 (twelve) hours for 7 days    Subjective fever  -     cefdinir (OMNICEF) 300 mg capsule; Take 1 capsule (300 mg total) by mouth every 12 (twelve) hours for 7 days  -     Thermometer MISC; Use as needed (fever)      Patient Instructions   Care Anywhere Phone number is [phone] if you need assistance or have further questions    Earache   DISCHARGE INSTRUCTIONS:   Return to the emergency department if:   You have a severe earache. You have ear pain with itching, hearing loss, dizziness, a feeling of fullness in your ear, or ringing in your ears. Call your doctor if:   Your ear pain worsens or does not go away with treatment. You have drainage from your ear. You have a fever. Your outer ear becomes red, swollen, and warm. You have questions or concerns about your condition or care. Medicines: You may need any of the following:  Acetaminophen  decreases pain and fever. It is available without a doctor's order. Ask how much to take and how often to take it. Follow directions. Read the labels of all other medicines you are using to see if they also contain acetaminophen, or ask your doctor or pharmacist. Acetaminophen can cause liver damage if not taken correctly. NSAIDs , such as ibuprofen, help decrease swelling, pain, and fever. This medicine is available with or without a doctor's order.  NSAIDs can cause stomach bleeding or kidney problems in certain people. If you take blood thinner medicine, always ask your healthcare provider if NSAIDs are safe for you. Always read the medicine label and follow directions. Do not give aspirin to children younger than 18 years. Your child could develop Reye syndrome if he or she has the flu or a fever and takes aspirin. Reye syndrome can cause life-threatening brain and liver damage. Check your child's medicine labels for aspirin or salicylates. Take your medicine as directed. Contact your healthcare provider if you think your medicine is not helping or if you have side effects. Tell your provider if you are allergic to any medicine. Keep a list of the medicines, vitamins, and herbs you take. Include the amounts, and when and why you take them. Bring the list or the pill bottles to follow-up visits. Carry your medicine list with you in case of an emergency. Follow up with your doctor as directed:  Write down your questions so you remember to ask them during your visits. © Copyright [facility] 2023 Information is for End User's use only and may not be sold, redistributed or otherwise used for commercial purposes. The above information is an  only. It is not intended as medical advice for individual conditions or treatments. Talk to your doctor, nurse or pharmacist before following any medical regimen to see if it is safe and effective for you.

## 2023-10-18 NOTE — PROGRESS NOTES
Video Visit - Dmitri Atkinson 44 y.o. male MRN: [id number]    REQUIRED DOCUMENTATION:         1. This service was provided via vushaper. 2. Provider located at [address] [place] [address] Road  [phone].  3. [facility] provider: Destiney Haider PA-C.  4. Identify all parties in room with patient during [facility] visit:  significant other-permission granted  5. After connecting through [employer], patient was identified by name and date of birth. Patient was then informed that this was a Telemedicine visit and that the exam was being conducted confidentially over secure lines. My office door was closed. No one else was in the room. Patient acknowledged consent and understanding of privacy and security of the Telemedicine visit. I informed the patient that I have reviewed their record in Epic and presented the opportunity for them to ask any questions regarding the visit today. The patient agreed to participate. VITALS: Heart Rate: 78 BPM, Respiratory Rate: 12 RPM, Temperature Unavailable° F, Blood Pressure Unavailable mmHg, Pulse Ox Unavailable % on RA    HPI  Pt reports Saturday woke up after sleeping on R arm. R ear pain radiating to angle of mandible. Pulling on ear lobe caused popping sensation then ear pain improved. Last night slept the same and now having the same pain. Has itching to ear and warm sensation to ear. No fevers, nasal congestion, sinus pain/pressure, changes to hearing. Had recurrent ear infections and tubes as a kid. Physical Exam  Constitutional:       General: He is not in acute distress. Appearance: Normal appearance. He is not toxic-appearing. HENT:      Head: Normocephalic and atraumatic. Nose: No rhinorrhea. Mouth/Throat:      Mouth: Mucous membranes are moist.   Eyes:      Conjunctiva/sclera: Conjunctivae normal.      Comments: glasses   Pulmonary:      Effort: Pulmonary effort is normal. No respiratory distress. Breath sounds: No wheezing (no gross audible wheeze through computer). Musculoskeletal:      Cervical back: Normal range of motion. Skin:     Findings: No rash (on face or neck). Neurological:      Mental Status: He is alert. Cranial Nerves: No dysarthria or facial asymmetry. Psychiatric:         Mood and Affect: Mood normal.         Behavior: Behavior normal.         Diagnoses and all orders for this visit:    Dysfunction of right eustachian tube  -     cetirizine-pseudoephedrine (ZyrTEC-D) 5-120 MG per tablet; Take 1 tablet by mouth 2 (two) times a day  -     oxymetazoline (AFRIN) 0.05 % nasal spray; 2 sprays by Each Nare route daily at bedtime for 3 days  -     fluticasone (FLONASE) 50 mcg/act nasal spray; 1 spray into each nostril daily      Patient Instructions   Your pain is likely due to Eustachian tube dysfunction. This occurs when the pressure on the inside of your ear is different from the outside of your ear, typically due to allergies and sinus inflammation. Try OTC (Claritin or Zyrtec), Flonase, and Sudafed. Follow up with your PCP or and otolaryngologist (Ear Nose Throat specialist) for worsening pain, fevers, ear drainage, or if your pain doesn't improve with conservative measures. Eustachian Tube Dysfunction   AMBULATORY CARE:   Eustachian tube dysfunction (ETD)  is a condition that prevents your eustachian tubes from opening properly. It can also cause them to become blocked. Eustachian tubes connect your middle ear to the back of your nose and throat. These tubes open and allow air to flow in and out when you sneeze, swallow, or yawn.        Common signs and symptoms include the following:   Fullness or pressure in your ears    Muffled hearing, or a feeling you are hearing under water or have clogged ears    Pain in one or both ears    Ringing in your ears    Popping, crackling, or clicking feeling in your ears    Trouble keeping your balance    Call your doctor or otolaryngologist if:   Your symptoms do not improve or get worse. You have a fever. You have any hearing loss. You have questions or concerns about your condition or care. Treatment:  ETD may get better on its own without any treatment. If it continues, you may need any of the following:  Swallow, yawn, or chew gum  to help open your eustachian tubes. Your healthcare provider may also recommend you blow with your mouth shut and your nostrils pinched closed. Air pressure devices  push air into your nose and eustachian tubes to help relieve air pressure in your ear. Treatment for allergies  such as decongestants, antihistamines, and nasal steroids may improve ETD. They may help decrease swelling of the eustachian tubes. A myringotomy  is surgery to make a hole in your eardrum. The hole relieves pressure and lets fluid drain from your ear. A pressure equalizing (PE) tube may be used to keep the hole open and to help drain fluid. Tuboplasty  is a procedure to widen your eustachian tubes. Follow up with your doctor or otolaryngologist as directed:  Write down your questions so you remember to ask them during your visits. © Copyright [employer] 2023 Information is for End User's use only and may not be sold, redistributed or otherwise used for commercial purposes. The above information is an  only. It is not intended as medical advice for individual conditions or treatments. Talk to your doctor, nurse or pharmacist before following any medical regimen to see if it is safe and effective for you.

## 2023-10-18 NOTE — PATIENT INSTRUCTIONS
Your pain is likely due to Eustachian tube dysfunction. This occurs when the pressure on the inside of your ear is different from the outside of your ear, typically due to allergies and sinus inflammation. Try OTC (Claritin or Zyrtec), Flonase, and Sudafed. Follow up with your PCP or and otolaryngologist (Ear Nose Throat specialist) for worsening pain, fevers, ear drainage, or if your pain doesn't improve with conservative measures. Eustachian Tube Dysfunction   AMBULATORY CARE:   Eustachian tube dysfunction (ETD)  is a condition that prevents your eustachian tubes from opening properly. It can also cause them to become blocked. Eustachian tubes connect your middle ear to the back of your nose and throat. These tubes open and allow air to flow in and out when you sneeze, swallow, or yawn. Common signs and symptoms include the following:   Fullness or pressure in your ears    Muffled hearing, or a feeling you are hearing under water or have clogged ears    Pain in one or both ears    Ringing in your ears    Popping, crackling, or clicking feeling in your ears    Trouble keeping your balance    Call your doctor or otolaryngologist if:   Your symptoms do not improve or get worse. You have a fever. You have any hearing loss. You have questions or concerns about your condition or care. Treatment:  ETD may get better on its own without any treatment. If it continues, you may need any of the following:  Swallow, yawn, or chew gum  to help open your eustachian tubes. Your healthcare provider may also recommend you blow with your mouth shut and your nostrils pinched closed. Air pressure devices  push air into your nose and eustachian tubes to help relieve air pressure in your ear. Treatment for allergies  such as decongestants, antihistamines, and nasal steroids may improve ETD. They may help decrease swelling of the eustachian tubes. A myringotomy  is surgery to make a hole in your eardrum.  The hole relieves pressure and lets fluid drain from your ear. A pressure equalizing (PE) tube may be used to keep the hole open and to help drain fluid. Tuboplasty  is a procedure to widen your eustachian tubes. Follow up with your doctor or otolaryngologist as directed:  Write down your questions so you remember to ask them during your visits. © Copyright Amira Wong 2023 Information is for End User's use only and may not be sold, redistributed or otherwise used for commercial purposes. The above information is an  only. It is not intended as medical advice for individual conditions or treatments. Talk to your doctor, nurse or pharmacist before following any medical regimen to see if it is safe and effective for you.

## 2023-10-18 NOTE — CARE ANYWHERE EVISITS
Visit Summary for Kaushal Watts EARNEST - Gender: Male - Date of Birth: [id number]  Date: [id number] - Duration: 8 minutes  Patient: Kaushal TINOCO  Provider: Merlyn Mccollum PA-C    Patient Contact Information  [facility]  [address] [address]  [phone]    Visit Topics  Fever [Added By: Self - 2023-10-18]  Earache [Added By: Self - 2023-10-18]    Triage Questions   What is your current physical address in the event of a medical emergency? Answer []  Are you allergic to any medications? Answer []  Are you now or could you be pregnant? Answer []  Do you have any immune system compromise or chronic lung   disease? Answer []  Do you have any vulnerable family members in the home (infant, pregnant, cancer, elderly)? Answer []     Conversation Transcripts  [0A][0A] [Notification] You are connected with Merlyn Mccollum PA-C, Urgent Care Specialist.[0A][Notification] [facility] is located in [address] [place] [address],[address]. [0A][Notification] [facility] has shared health history. Joceline Burch .[0A]    Diagnosis  Otalgia, right ear  Fever, unspecified    Procedures  Value: [id number] Code: CPT-4 UNLISTED E&M SERVICE    Medications Prescribed    No prescriptions ordered    Electronically signed by: Mercedes Mendoaz(NPI [id number])

## 2023-10-18 NOTE — PATIENT INSTRUCTIONS
[facility] Phone number is [phone] if you need assistance or have further questions    Earache   DISCHARGE INSTRUCTIONS:   Return to the emergency department if:   You have a severe earache. You have ear pain with itching, hearing loss, dizziness, a feeling of fullness in your ear, or ringing in your ears. Call your doctor if:   Your ear pain worsens or does not go away with treatment. You have drainage from your ear. You have a fever. Your outer ear becomes red, swollen, and warm. You have questions or concerns about your condition or care. Medicines: You may need any of the following:  Acetaminophen  decreases pain and fever. It is available without a doctor's order. Ask how much to take and how often to take it. Follow directions. Read the labels of all other medicines you are using to see if they also contain acetaminophen, or ask your doctor or pharmacist. Acetaminophen can cause liver damage if not taken correctly. NSAIDs , such as ibuprofen, help decrease swelling, pain, and fever. This medicine is available with or without a doctor's order. NSAIDs can cause stomach bleeding or kidney problems in certain people. If you take blood thinner medicine, always ask your healthcare provider if NSAIDs are safe for you. Always read the medicine label and follow directions. Do not give aspirin to children younger than 18 years. Your child could develop Reye syndrome if he or she has the flu or a fever and takes aspirin. Reye syndrome can cause life-threatening brain and liver damage. Check your child's medicine labels for aspirin or salicylates. Take your medicine as directed. Contact your healthcare provider if you think your medicine is not helping or if you have side effects. Tell your provider if you are allergic to any medicine. Keep a list of the medicines, vitamins, and herbs you take. Include the amounts, and when and why you take them.  Bring the list or the pill bottles to follow-up visits. Carry your medicine list with you in case of an emergency. Follow up with your doctor as directed:  Write down your questions so you remember to ask them during your visits. © Copyright [employer] 2023 Information is for End User's use only and may not be sold, redistributed or otherwise used for commercial purposes. The above information is an  only. It is not intended as medical advice for individual conditions or treatments. Talk to your doctor, nurse or pharmacist before following any medical regimen to see if it is safe and effective for you.

## 2023-10-18 NOTE — [facility] EVISITS
Visit Summary for Zak TINOCO - Gender: Male - Date of Birth: [id number]  Date: [id number] - Duration: 11 minutes  Patient: Zak TINOCO  Provider: Jeanna Turcios PA-C    Patient Contact Information  Address  [address]; [place] [address]  [phone]    Visit Topics  Right ear pain down into right side jaw. Started Saturday and lasted till Monday. Felt fine. I fell asleep on my arm last night and woke up again with bad pain in my ear again and jaw. [Added By: Self - 2023-10-18]  Earache [Added By: Self - 2023-10-18]    Triage Questions   What is your current physical address in the event of a medical emergency? Answer []  Are you allergic to any medications? Answer []  Are you now or could you be pregnant? Answer []  Do you have any immune system compromise or chronic lung   disease? Answer []  Do you have any vulnerable family members in the home (infant, pregnant, cancer, elderly)? Answer []     Conversation Transcripts  [0A][0A] [Notification] You are connected with Jeanna Turcios PA-C, Urgent Care Specialist.[0A][Notification] Zay Ramos is located in [place]. [0A][Notification] Zay Ramos has shared health history. Darylene Pipe .[0A]    Diagnosis  Unspecified Eustachian tube disorder, right ear    Procedures  Value: 26798 Code: CPT-4 UNLISTED E&M SERVICE    Medications Prescribed    No prescriptions ordered    Electronically signed by: Mercedes Green(NPI [id number])

## 2023-10-19 ENCOUNTER — AMB VIDEO VISIT (OUTPATIENT)
Dept: OTHER | Facility: HOSPITAL | Age: 43
End: 2023-10-19
Payer: COMMERCIAL

## 2023-10-19 DIAGNOSIS — F43.10 PTSD (POST-TRAUMATIC STRESS DISORDER): ICD-10-CM

## 2023-10-19 DIAGNOSIS — F33.1 MODERATE EPISODE OF RECURRENT MAJOR DEPRESSIVE DISORDER (HCC): ICD-10-CM

## 2023-10-19 DIAGNOSIS — F41.9 ANXIETY: ICD-10-CM

## 2023-10-19 PROCEDURE — 99212 OFFICE O/P EST SF 10 MIN: CPT | Performed by: FAMILY MEDICINE

## 2023-10-19 RX ORDER — SERTRALINE HYDROCHLORIDE 100 MG/1
TABLET, FILM COATED ORAL
Qty: 90 TABLET | Refills: 0 | Status: SHIPPED | OUTPATIENT
Start: 2023-10-19

## 2023-10-19 NOTE — TELEPHONE ENCOUNTER
I can't give him a note for something I did not see him for. He needs to call the doctor who evaluated him.

## 2023-10-19 NOTE — TELEPHONE ENCOUNTER
Pts wife called   Shan Irizarry had an evisit yesterday because of ear pain   She states he forgot to ask for a wrk note, they have called the number that they where given to call if they need a work not but have not heard anything. She is asking if you can write a note for yesterday, today and tomorrow. She states Shan Irizarry works out side and is afraid being out side will bother his ear.      I did tell her that I do not know if Wade Mast will write a note because she is not the one who seen him

## 2023-10-19 NOTE — TELEPHONE ENCOUNTER
Wife called yesterday after you left to see if you addressed the message regarding a work note. Not sure if the message went to you since meds were also ordered with the same encounter.

## 2023-10-19 NOTE — CARE ANYWHERE EVISITS
Visit Summary for Roger TINOCO - Gender: Male - Date of Birth: [id number]  Date: [id number] - Duration: 5 minutes  Patient: Roger TINOCO  Provider: Jamee Chaudhari    Patient Contact Information  Address  [address]; [address]  [phone]    Visit Topics  I was seen virtually yesterday 2x by Jeannie Ghosh with whom I can not get in contact with at all with her provided phone numbers. I have an ear infection and she believes a issue with ETD I believe. I tried 4c now to connect with a PA on this navjot and   canâ€™t. I need a doctors note for yesterday and also today and I am still having sweats and chills and working outside I do not feel in the shape to be doing so tomorrow. My ear is very sore and hurts as well as my jaw is still very tender. I need some   assistance if I may regarding documentation. [Added By: Self - 2023-10-19]    Sick Slip  Reason [ILLNESS]. Remarks [Please excuse Umer De Jesus from work duties  for 10/18/23, 10/19/23, 10/20/23 and 10/21/23 if needed. ]. Triage Questions   What is your current physical address in the event of a medical emergency? Answer []  Are you allergic to any medications? Answer []  Are you now or could you be pregnant? Answer []  Do you have any immune system compromise or chronic lung   disease? Answer []  Do you have any vulnerable family members in the home (infant, pregnant, cancer, elderly)? Answer []     Conversation Transcripts  [0A][0A] [Notification] You are connected with Rossy Correia Family Physician.[0A][Notification] Umer De Jesus is located in [place]. [0A][Notification] Umer De Jesus has shared health history. Nancy Espinal .[0A]    Diagnosis  Otitis media, unspecified, right ear    Procedures  Value: 47029 Code: CPT-4 UNLISTED E&M SERVICE    Medications Prescribed    No prescriptions ordered    Provider Notes  [0A][0A] HPI: 45 yo male was seen twice by other [address] [place] [address] provider in last 2 days for right earache. Was prescribed Cefdenir 300 mg BID last night. Started this morning. Hasn't starting working yet. Also taking Aleve prn for pain and fever. [0A]Call today   requesting work note for yesterday, today and tomorrow. [0A]Will issue note as requested. If no improvement, or increase symptoms, needs in person visit at urgent care or PCP. [0A]Can place warm heat over ear as needed. May help with pain. [0A]Increase   fluids. Take aleve with food. [0A]DX right otitis media[0A]Plan: continue current antibiotic as prescribed[0A]continue aleve as needed with food[0A]Increase fluids[0A]apply heating pad to  right ear (warm) as needed. [0A][0A]    Electronically signed by: Rossy Guillaume(NPI [id number])

## 2023-10-20 ENCOUNTER — TELEPHONE (OUTPATIENT)
Dept: OTHER | Facility: HOSPITAL | Age: 43
End: 2023-10-20

## 2023-10-20 NOTE — LETTER
October 20, 2023     Patient: Umer De Jesus  YOB: 1980  Date of Visit: 10/20/2023      To Whom it May Concern:    Umer De Jesus is under my professional care. Roger Cooley was seen in my office on 10/20/2023. Roger Cooley may return to work on 10/20/2023 . If you have any questions or concerns, please don't hesitate to call.          Sincerely,          ARIEL Phillips        CC: No Recipients

## 2023-11-17 DIAGNOSIS — M10.9 GOUT, UNSPECIFIED CAUSE, UNSPECIFIED CHRONICITY, UNSPECIFIED SITE: ICD-10-CM

## 2023-11-17 RX ORDER — COLCHICINE 0.6 MG/1
TABLET ORAL
Qty: 90 TABLET | Refills: 0 | Status: SHIPPED | OUTPATIENT
Start: 2023-11-17

## 2023-11-25 DIAGNOSIS — I10 ESSENTIAL HYPERTENSION: ICD-10-CM

## 2023-11-25 DIAGNOSIS — M10.9 GOUT, UNSPECIFIED CAUSE, UNSPECIFIED CHRONICITY, UNSPECIFIED SITE: ICD-10-CM

## 2023-11-27 RX ORDER — ALLOPURINOL 100 MG/1
TABLET ORAL
Qty: 180 TABLET | Refills: 0 | Status: SHIPPED | OUTPATIENT
Start: 2023-11-27

## 2023-11-27 RX ORDER — LISINOPRIL 20 MG/1
TABLET ORAL
Qty: 90 TABLET | Refills: 0 | Status: SHIPPED | OUTPATIENT
Start: 2023-11-27

## 2023-11-27 RX ORDER — AMLODIPINE BESYLATE 5 MG/1
5 TABLET ORAL DAILY
Qty: 90 TABLET | Refills: 0 | Status: SHIPPED | OUTPATIENT
Start: 2023-11-27

## 2023-12-10 ENCOUNTER — AMB VIDEO VISIT (OUTPATIENT)
Dept: OTHER | Facility: HOSPITAL | Age: 43
End: 2023-12-10
Payer: COMMERCIAL

## 2023-12-10 PROCEDURE — 99212 OFFICE O/P EST SF 10 MIN: CPT | Performed by: FAMILY MEDICINE
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
## 2023-12-11 ENCOUNTER — RA CDI HCC (OUTPATIENT)
Dept: OTHER | Facility: HOSPITAL | Age: 43
End: 2023-12-11

## 2023-12-11 NOTE — PROGRESS NOTES
[address] [facility] coding opportunities       Chart reviewed, no opportunity found: CHART REVIEWED, NO OPPORTUNITY FOUND        Patients Insurance        Commercial Insurance: Erik Morrow

## 2023-12-20 ENCOUNTER — OFFICE VISIT (OUTPATIENT)
Dept: FAMILY MEDICINE CLINIC | Facility: CLINIC | Age: 43
End: 2023-12-20
Payer: COMMERCIAL

## 2023-12-20 ENCOUNTER — TELEPHONE (OUTPATIENT)
Dept: FAMILY MEDICINE CLINIC | Facility: CLINIC | Age: 43
End: 2023-12-20

## 2023-12-20 VITALS
WEIGHT: 243.3 LBS | DIASTOLIC BLOOD PRESSURE: 88 MMHG | HEART RATE: 85 BPM | SYSTOLIC BLOOD PRESSURE: 142 MMHG | BODY MASS INDEX: 38.19 KG/M2 | OXYGEN SATURATION: 98 % | HEIGHT: 67 IN

## 2023-12-20 DIAGNOSIS — F43.10 PTSD (POST-TRAUMATIC STRESS DISORDER): ICD-10-CM

## 2023-12-20 DIAGNOSIS — F41.1 GENERALIZED ANXIETY DISORDER: ICD-10-CM

## 2023-12-20 DIAGNOSIS — F33.1 MODERATE EPISODE OF RECURRENT MAJOR DEPRESSIVE DISORDER (HCC): Primary | ICD-10-CM

## 2023-12-20 PROCEDURE — 99214 OFFICE O/P EST MOD 30 MIN: CPT | Performed by: PHYSICIAN ASSISTANT

## 2023-12-20 RX ORDER — PAROXETINE HYDROCHLORIDE 37.5 MG/1
37.5 TABLET, FILM COATED, EXTENDED RELEASE ORAL EVERY MORNING
Qty: 90 TABLET | Refills: 1 | Status: SHIPPED | OUTPATIENT
Start: 2023-12-20 | End: 2023-12-22 | Stop reason: SDUPTHER

## 2023-12-20 RX ORDER — PAROXETINE HCL 25 MG
25 TABLET, EXTENDED RELEASE 24 HR ORAL EVERY MORNING
Qty: 90 TABLET | Refills: 1 | Status: SHIPPED | OUTPATIENT
Start: 2023-12-20 | End: 2023-12-22 | Stop reason: SDUPTHER

## 2023-12-20 NOTE — LETTER
December 20, 2023     Patient: Kaushal Hernandez  YOB: 1980  Date of Visit: 12/20/2023      To Whom it May Concern:    Kaushal Hernandez is under my professional care. Kaushal was seen in my office on 12/20/2023. Kaushal may return to work on 1/2/2024 .    If you have any questions or concerns, please don't hesitate to call.         Sincerely,          Micaela Day PA-C

## 2023-12-20 NOTE — ASSESSMENT & PLAN NOTE
Patient is scheduled to start counseling again next week.  Encouraged him to follow through with this.

## 2023-12-20 NOTE — ASSESSMENT & PLAN NOTE
We will switch back to Paxil CR from Zoloft.  Follow-up in 6 weeks or sooner if needed  Patient is scheduled to reestablish with counselor next week  No suicidal or homicidal thoughts
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
## 2023-12-20 NOTE — ASSESSMENT & PLAN NOTE
We will switch back to Paxil CR from Zoloft.  Follow-up in 6 weeks or sooner if needed  Patient is scheduled to reestablish with counselor next week  Anxiety is also exacerbated by upcoming surgery.

## 2023-12-22 ENCOUNTER — TELEPHONE (OUTPATIENT)
Dept: FAMILY MEDICINE CLINIC | Facility: CLINIC | Age: 43
End: 2023-12-22

## 2023-12-22 DIAGNOSIS — F33.1 MODERATE EPISODE OF RECURRENT MAJOR DEPRESSIVE DISORDER (HCC): ICD-10-CM

## 2023-12-22 DIAGNOSIS — F41.1 GENERALIZED ANXIETY DISORDER: ICD-10-CM

## 2023-12-22 DIAGNOSIS — F43.10 PTSD (POST-TRAUMATIC STRESS DISORDER): ICD-10-CM

## 2023-12-22 RX ORDER — PAROXETINE HYDROCHLORIDE HEMIHYDRATE 25 MG/1
25 TABLET, FILM COATED, EXTENDED RELEASE ORAL EVERY MORNING
Qty: 90 TABLET | Refills: 1 | Status: SHIPPED | OUTPATIENT
Start: 2023-12-22

## 2023-12-22 RX ORDER — PAROXETINE HYDROCHLORIDE HEMIHYDRATE 25 MG/1
25 TABLET, FILM COATED, EXTENDED RELEASE ORAL EVERY MORNING
Qty: 90 TABLET | Refills: 1 | Status: SHIPPED | OUTPATIENT
Start: 2023-12-22 | End: 2023-12-22 | Stop reason: SDUPTHER

## 2023-12-22 RX ORDER — PAROXETINE HYDROCHLORIDE HEMIHYDRATE 37.5 MG/1
37.5 TABLET, FILM COATED, EXTENDED RELEASE ORAL EVERY MORNING
Qty: 90 TABLET | Refills: 1 | Status: SHIPPED | OUTPATIENT
Start: 2023-12-22 | End: 2023-12-22 | Stop reason: SDUPTHER

## 2023-12-22 RX ORDER — PAROXETINE HYDROCHLORIDE HEMIHYDRATE 37.5 MG/1
37.5 TABLET, FILM COATED, EXTENDED RELEASE ORAL EVERY MORNING
Qty: 90 TABLET | Refills: 1 | Status: SHIPPED | OUTPATIENT
Start: 2023-12-22

## 2023-12-22 NOTE — TELEPHONE ENCOUNTER
[facility] needs new scripts for PAXIL 37.5 er and Paxil 25 er -   Will not authorize on BRAND NECESSITY      These drugs need to be ordered for next week, not in stock.  PLS ADVISE ASAP

## 2024-02-04 ENCOUNTER — AMB VIDEO VISIT (OUTPATIENT)
Dept: OTHER | Facility: HOSPITAL | Age: 44
End: 2024-02-04
Payer: COMMERCIAL

## 2024-02-04 DIAGNOSIS — U07.1 COVID-19: Primary | ICD-10-CM

## 2024-02-04 PROCEDURE — 99212 OFFICE O/P EST SF 10 MIN: CPT | Performed by: PHYSICIAN ASSISTANT

## 2024-02-04 RX ORDER — MOLNUPIRAVIR 200 MG/1
800 CAPSULE ORAL EVERY 12 HOURS
Qty: 40 CAPSULE | Refills: 0 | Status: SHIPPED | OUTPATIENT
Start: 2024-02-04 | End: 2024-02-09

## 2024-02-04 RX ORDER — BENZONATATE 100 MG/1
100 CAPSULE ORAL 3 TIMES DAILY PRN
Qty: 20 CAPSULE | Refills: 0 | Status: SHIPPED | OUTPATIENT
Start: 2024-02-04

## 2024-02-04 NOTE — PATIENT INSTRUCTIONS
Your COVID test is positive.    Please stay quarantined/isolation in your home.  Do not interact with your family or other people in the community.    You must stay isolation/quarantine for a minimum of 5 days after symptoms have begun and be symptom-free (without fever) for 24 hours before you go out of quarantine/isolation. Then follow strict mask wearing for an additional 5 days.    The people you live with or people should follow CDC recommendations regarding protocols.   https://www.cdc.gov/media/release    Take Vitamin D3 2000 units daily. 18yrs and older only    Please avoid in-store purchasing of supplies and medications    Please self-prone (sleep on your stomach if possible).    Please call this department with any questions or concerns    Call your family doctor when you receive these results    Return to the ED with any worsening symptoms

## 2024-02-04 NOTE — PROGRESS NOTES
Required Documentation:  Encounter provider Alejandra Rodriguez PA-C    Provider located at [facility]  VIRTUAL CARE   [address] [address]    Identify all parties in room with patient during virtual visit:  No one else    The patient was identified by name and date of birth. Kaushal Hernandez was informed that this is a telemedicine visit and that the visit is being conducted through the [employer] platform. He agrees to proceed..  My office door was closed. No one else was in the room.  He acknowledged consent and understanding of privacy and security of the video platform. The patient has agreed to participate and understands they can discontinue the visit at any time.    Verification of patient location:    Patient is located at home in the following state in which I hold an active license [place]    Patient is aware this is a billable service.     Reason for visit is No chief complaint on file.       Subjective  HPI   Patient states since last night into today he has been ill.  He had a fever of 100.3.  He took a covid test and it was positive.  He states he had covid  2 months ago.  He is having bodyaches, sore throat, cough, congestion.      Past Medical History:   Diagnosis Date    Anxiety     Blindness of left eye     Knee pain, right        Past Surgical History:   Procedure Laterality Date    EYE SURGERY      FL INJECTION RIGHT KNEE (ARTHROGRAM)  5/21/2019    HAND SURGERY      NECK SURGERY      WRIST SURGERY          Allergies   Allergen Reactions    Amoxil [Amoxicillin] Rash    Ciprofloxacin Rash    Penicillins Rash       Review of Systems   Constitutional:  Positive for chills, fatigue and fever.   HENT:  Positive for congestion and sore throat.    Respiratory:  Positive for cough.    Cardiovascular: Negative.    Gastrointestinal: Negative.    Musculoskeletal: Negative.    Neurological:  Positive for headaches.   Psychiatric/Behavioral: Negative.          Video Exam    There were no vitals filed for this visit.    Physical Exam  Constitutional:       General: He is not in acute distress.     Appearance: Normal appearance. He is not ill-appearing, toxic-appearing or diaphoretic.   HENT:      Nose: Congestion present.   Pulmonary:      Effort: Pulmonary effort is normal. No respiratory distress.   Musculoskeletal:         General: No tenderness.   Skin:     General: Skin is dry.   Neurological:      General: No focal deficit present.      Mental Status: He is alert and oriented to person, place, and time.         Visit Time  Total Visit Duration: 5 minutes    Assessment/Plan:    Diagnoses and all orders for this visit:    COVID-19  -     molnupiravir (Lagevrio) 200 mg capsule; Take 4 capsules (800 mg total) by mouth every 12 (twelve) hours for 5 days  -     benzonatate (TESSALON PERLES) 100 mg capsule; Take 1 capsule (100 mg total) by mouth 3 (three) times a day as needed for cough        Patient Instructions   Your COVID test is positive.    Please stay quarantined/isolation in your home.  Do not interact with your family or other people in the community.    You must stay isolation/quarantine for a minimum of 5 days after symptoms have begun and be symptom-free (without fever) for 24 hours before you go out of quarantine/isolation. Then follow strict mask wearing for an additional 5 days.    The people you live with or people should follow CDC recommendations regarding protocols.   https://www.cdc.gov/media/release    Take Vitamin D3 2000 units daily. 18yrs and older only    Please avoid in-store purchasing of supplies and medications    Please self-prone (sleep on your stomach if possible).    Please call this department with any questions or concerns    Call your family doctor when you receive these results    Return to the ED with any worsening symptoms

## 2024-02-04 NOTE — CARE ANYWHERE EVISITS
Visit Summary for KIMBERLY TINOCO - Gender: Male - Date of Birth: 1980  Date: [id number] - Duration: 6 minutes  Patient: KIMBERLY TINOCO  Provider: Alejandra Rodriguez PA-C    Patient Contact Information  Address  [address]  [facility] [place]; [place] [address]  [phone]    Visit Topics  Cold [Added By: Self - 2024-02-04]  Headache [Added By: Self - 2024-02-04]  Fever [Added By: Self - 2024-02-04]    Triage Questions   What is your current physical address in the event of a medical emergency? Answer []  Are you allergic to any medications? Answer []  Are you now or could you be pregnant? Answer []  Do you have any immune system compromise or chronic lung   disease? Answer []  Do you have any vulnerable family members in the home (infant, pregnant, cancer, elderly)? Answer []     Conversation Transcripts  [0A][0A] [Notification] You are connected with Alejandra Rodriguez PA-C, Urgent Care Specialist.[0A][Notification] KIMBERLY TINOCO is located in [place].[0A][Notification] KIMBERLY TINOCO has shared health history...[0A]    Diagnosis  COVID-19    Procedures  Value: 75054 Code: CPT-4 UNLISTED E&M SERVICE    Medications Prescribed    No prescriptions ordered    Electronically signed by: Alejandra Rodriguez PA-C(NPI [id number])

## 2024-02-04 NOTE — LETTER
February 4, 2024     Patient: Kaushal Hernandez  YOB: 1980  Date of Visit: 2/4/2024      To Whom it May Concern:    Kaushal Hernandez is under my professional care. Kaushal was seen in my office on 2/4/2024. Kaushal may return to work on 02/10/24  and fever free for 24 hrs.  Upon returning he should mask for an additional 5 days.    If you have any questions or concerns, please don't hesitate to call.         Sincerely,          Alejandra Rodriguez PA-C        CC: No Recipients

## 2024-02-17 DIAGNOSIS — M10.9 GOUT, UNSPECIFIED CAUSE, UNSPECIFIED CHRONICITY, UNSPECIFIED SITE: ICD-10-CM

## 2024-02-17 DIAGNOSIS — I10 ESSENTIAL HYPERTENSION: ICD-10-CM

## 2024-02-19 RX ORDER — AMLODIPINE BESYLATE 5 MG/1
5 TABLET ORAL DAILY
Qty: 90 TABLET | Refills: 0 | Status: SHIPPED | OUTPATIENT
Start: 2024-02-19

## 2024-02-19 RX ORDER — LISINOPRIL 20 MG/1
TABLET ORAL
Qty: 90 TABLET | Refills: 0 | Status: SHIPPED | OUTPATIENT
Start: 2024-02-19

## 2024-02-19 RX ORDER — ALLOPURINOL 100 MG/1
TABLET ORAL
Qty: 180 TABLET | Refills: 0 | Status: SHIPPED | OUTPATIENT
Start: 2024-02-19

## 2024-02-26 ENCOUNTER — AMB VIDEO VISIT (OUTPATIENT)
Dept: OTHER | Facility: HOSPITAL | Age: 44
End: 2024-02-26
Payer: COMMERCIAL

## 2024-02-26 DIAGNOSIS — H00.014 HORDEOLUM EXTERNUM LEFT UPPER EYELID: Primary | ICD-10-CM

## 2024-02-26 PROCEDURE — 99212 OFFICE O/P EST SF 10 MIN: CPT | Performed by: NURSE PRACTITIONER

## 2024-02-26 RX ORDER — TOBRAMYCIN 3 MG/ML
1 SOLUTION/ DROPS OPHTHALMIC
Qty: 5 ML | Refills: 0 | Status: SHIPPED | OUTPATIENT
Start: 2024-02-26 | End: 2024-03-04
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
## 2024-02-26 NOTE — CARE ANYWHERE EVISITS
Visit Summary for KIMBERLY TINOCO - Gender: Male - Date of Birth: 1980  Date: [id number] - Duration: 6 minutes  Patient: KIMBERLY TINOCO  Provider: Richard GEORGE    Patient Contact Information  Address  [place]; [place] [address]  [phone]    Visit Topics  Possibly pink eye/left eye is my bad eye and no vision but had a stye inside upper eyelid and now eye is very watery/sore/itchy.  [Added By: Self - 2024-02-26]    Triage Questions   What is your current physical address in the event of a medical emergency? Answer []  Are you allergic to any medications? Answer []  Are you now or could you be pregnant? Answer []  Do you have any immune system compromise or chronic lung   disease? Answer []  Do you have any vulnerable family members in the home (infant, pregnant, cancer, elderly)? Answer []     Conversation Transcripts  [0A][0A] [Notification] You are connected with Richard GEORGE, Urgent Care Specialist.[0A][Notification] KIMBERLY TINOCO is located in [place].[0A][Notification] KIMBERLY TINOCO has shared health history...[0A]    Diagnosis  Hordeolum externum left upper eyelid    Procedures  Value: 13252 Code: CPT-4 UNLISTED E&M SERVICE    Medications Prescribed    No prescriptions ordered    Electronically signed by: Richard Arredondo(NPI [id number])

## 2024-02-27 NOTE — PATIENT INSTRUCTIONS
Warm compresses 3 times per day.  Start eye drops.  Call eye doctor ASAP for follow up tomorrow to check your eye.  Go to ER with any eye pain or worsening symtpoms.

## 2024-04-16 DIAGNOSIS — M10.9 GOUT, UNSPECIFIED CAUSE, UNSPECIFIED CHRONICITY, UNSPECIFIED SITE: ICD-10-CM

## 2024-04-17 RX ORDER — COLCHICINE 0.6 MG/1
TABLET ORAL
Qty: 90 TABLET | Refills: 0 | Status: SHIPPED | OUTPATIENT
Start: 2024-04-17

## 2024-05-17 DIAGNOSIS — F33.1 MODERATE EPISODE OF RECURRENT MAJOR DEPRESSIVE DISORDER (HCC): ICD-10-CM

## 2024-05-17 DIAGNOSIS — F41.1 GENERALIZED ANXIETY DISORDER: ICD-10-CM

## 2024-05-17 DIAGNOSIS — F43.10 PTSD (POST-TRAUMATIC STRESS DISORDER): ICD-10-CM

## 2024-05-18 ENCOUNTER — TELEPHONE (OUTPATIENT)
Age: 44
End: 2024-05-18

## 2024-05-18 RX ORDER — PAROXETINE HYDROCHLORIDE HEMIHYDRATE 37.5 MG/1
37.5 TABLET, FILM COATED, EXTENDED RELEASE ORAL EVERY MORNING
Qty: 90 TABLET | Refills: 1 | Status: SHIPPED | OUTPATIENT
Start: 2024-05-18

## 2024-05-18 RX ORDER — PAROXETINE HYDROCHLORIDE HEMIHYDRATE 25 MG/1
25 TABLET, FILM COATED, EXTENDED RELEASE ORAL EVERY MORNING
Qty: 90 TABLET | Refills: 1 | Status: SHIPPED | OUTPATIENT
Start: 2024-05-18

## 2024-05-18 NOTE — TELEPHONE ENCOUNTER
[id number] [place] - [facility]

## 2024-05-20 ENCOUNTER — OFFICE VISIT (OUTPATIENT)
Dept: FAMILY MEDICINE CLINIC | Facility: CLINIC | Age: 44
End: 2024-05-20
Payer: COMMERCIAL

## 2024-05-20 VITALS
HEART RATE: 82 BPM | SYSTOLIC BLOOD PRESSURE: 148 MMHG | WEIGHT: 254.4 LBS | BODY MASS INDEX: 39.93 KG/M2 | HEIGHT: 67 IN | OXYGEN SATURATION: 99 % | DIASTOLIC BLOOD PRESSURE: 90 MMHG

## 2024-05-20 DIAGNOSIS — Z13.0 SCREENING FOR IRON DEFICIENCY ANEMIA: ICD-10-CM

## 2024-05-20 DIAGNOSIS — F41.1 GENERALIZED ANXIETY DISORDER: ICD-10-CM

## 2024-05-20 DIAGNOSIS — Z13.9 SCREENING FOR UNSPECIFIED CONDITION: ICD-10-CM

## 2024-05-20 DIAGNOSIS — F33.1 MODERATE EPISODE OF RECURRENT MAJOR DEPRESSIVE DISORDER (HCC): ICD-10-CM

## 2024-05-20 DIAGNOSIS — Z13.21 ENCOUNTER FOR VITAMIN DEFICIENCY SCREENING: ICD-10-CM

## 2024-05-20 DIAGNOSIS — F43.10 PTSD (POST-TRAUMATIC STRESS DISORDER): ICD-10-CM

## 2024-05-20 DIAGNOSIS — I10 ESSENTIAL HYPERTENSION: ICD-10-CM

## 2024-05-20 DIAGNOSIS — R53.83 OTHER FATIGUE: Primary | ICD-10-CM

## 2024-05-20 PROCEDURE — 99214 OFFICE O/P EST MOD 30 MIN: CPT | Performed by: PHYSICIAN ASSISTANT

## 2024-05-20 NOTE — ASSESSMENT & PLAN NOTE
Continue Paxil CR  No suicidal or homicidal thoughts  We discussed that underlying fatigue is likely due to his unsustainable work schedule.  I recommended that he contact his counselor to restart counseling.  I recommended that he cut back his hours and stop working so much over time.  I recommended that he set boundaries with his mother.

## 2024-05-20 NOTE — ASSESSMENT & PLAN NOTE
Will check labs.   We discussed that underlying fatigue is likely due to his unsustainable work schedule.  I recommended that he cut back his hours and stop working so much over time.

## 2024-05-20 NOTE — LETTER
May 20, 2024     Patient: Kaushal Hernandez  YOB: 1980  Date of Visit: 5/20/2024      To Whom it May Concern:    Kaushal Hernandez is under my professional care. Kaushal was seen in my office on 5/20/2024. Kaushal may return to work on 5/24/2024  with no restrictions.    If you have any questions or concerns, please don't hesitate to call.         Sincerely,            Micaela Day PA-C

## 2024-05-20 NOTE — PROGRESS NOTES
Ambulatory Visit  Name: Kaushal Hernandez      : 1980      MRN: [id number]  Encounter Provider: Micaela Day PA-C  Encounter Date: 2024   Encounter department: [facility]    Assessment & Plan   1. Other fatigue  Assessment & Plan:  Will check labs.   We discussed that underlying fatigue is likely due to his unsustainable work schedule.  I recommended that he cut back his hours and stop working so much over time.      Orders:  -     Comprehensive metabolic panel; Future  -     CBC and differential; Future  -     TSH, 3rd generation with Free T4 reflex; Future  -     Iron Panel (Includes Ferritin, Iron Sat%, Iron, and TIBC); Future  -     Hemoglobin A1C; Future  -     Lyme Total AB W Reflex to IGM/IGG; Future  -     Vitamin D 25 hydroxy; Future  2. Moderate episode of recurrent major depressive disorder (HCC)  Assessment & Plan:  Continue Paxil CR  No suicidal or homicidal thoughts  We discussed that underlying fatigue is likely due to his unsustainable work schedule.  I recommended that he contact his counselor to restart counseling.  I recommended that he cut back his hours and stop working so much over time.  I recommended that he set boundaries with his mother.  3. Generalized anxiety disorder  Assessment & Plan:  Continue Paxil CR  No suicidal or homicidal thoughts  We discussed that underlying fatigue is likely due to his unsustainable work schedule.  I recommended that he contact his counselor to restart counseling.  I recommended that he cut back his hours and stop working so much over time.  I recommended that he set boundaries with his mother.  4. Essential hypertension  5. PTSD (post-traumatic stress disorder)  Assessment & Plan:  Continue Paxil CR  No suicidal or homicidal thoughts  We discussed that underlying fatigue is likely due to his unsustainable work schedule.  I recommended that he contact his counselor to restart counseling.  I recommended that he cut back his hours and stop  working so much over time.  I recommended that he set boundaries with his mother.  6. Screening for unspecified condition  -     Comprehensive metabolic panel; Future  -     CBC and differential; Future  -     TSH, 3rd generation with Free T4 reflex; Future  -     Iron Panel (Includes Ferritin, Iron Sat%, Iron, and TIBC); Future  -     Hemoglobin A1C; Future  -     Lyme Total AB W Reflex to IGM/IGG; Future  -     Vitamin D 25 hydroxy; Future  7. Screening for iron deficiency anemia  -     CBC and differential; Future  -     Iron Panel (Includes Ferritin, Iron Sat%, Iron, and TIBC); Future  8. Encounter for vitamin deficiency screening  -     Vitamin D 25 hydroxy; Future         History of Present Illness   {Disappearing Hyperlinks I Encounters * My Last Note * Since Last Visit * History :11725}  Kaushal is a very pleasant 44-year-old male who is here today complaining of fatigue.  He admits that he has been working 12-hour shifts, 7 days a week, for the past 10 weeks.  He has been picking up over time to help cover his bills.  He admits that he is financially stressed out.  He admits that his mother is always asking him for money.  He feels bad not helping her, but she has problems with alcohol addiction and mental health.  She is also not kind to Kaushal and his family.  He admits that he has not had time to see his counselor.  He admits that he is doing fine on his medication.  He admits that Paxil has been the best medication for his anxiety and depression out of all of the other medications that he has been on in the past.  He denies any suicidal or homicidal thoughts.  He admits that he is very tired, but has difficulty sleeping due to racing thoughts.  He is asking if he can get labs to ensure that there is nothing else making him feel fatigued despite being overworked.  He does plan to cut back on his work schedule and make more time for himself.      Review of Systems   Constitutional:  Positive for fatigue.  Negative for chills, diaphoresis and fever.   HENT:  Negative for congestion, ear pain, postnasal drip, rhinorrhea, sneezing, sore throat and trouble swallowing.    Eyes:  Negative for pain and visual disturbance.   Respiratory:  Negative for apnea, cough, shortness of breath and wheezing.    Cardiovascular:  Negative for chest pain and palpitations.   Gastrointestinal:  Negative for abdominal pain, constipation, diarrhea, nausea and vomiting.   Genitourinary:  Negative for dysuria and hematuria.   Musculoskeletal:  Negative for arthralgias, gait problem and myalgias.   Neurological:  Negative for dizziness, syncope, weakness, light-headedness, numbness and headaches.   Psychiatric/Behavioral:  Positive for dysphoric mood and sleep disturbance. Negative for suicidal ideas. The patient is nervous/anxious.      Past Medical History:   Diagnosis Date   • Anxiety    • Blindness of left eye    • Knee pain, right      Past Surgical History:   Procedure Laterality Date   • EYE SURGERY     • FL INJECTION RIGHT KNEE (ARTHROGRAM)  5/21/2019   • HAND SURGERY     • NECK SURGERY     • WRIST SURGERY       Family History   Problem Relation Age of Onset   • Diabetes Mother    • Hypertension Mother    • Anxiety disorder Mother    • Depression Mother      Social History     Tobacco Use   • Smoking status: Never   • Smokeless tobacco: Never   Vaping Use   • Vaping status: Never Used   Substance and Sexual Activity   • Alcohol use: Never   • Drug use: Never   • Sexual activity: Not on file     Current Outpatient Medications on File Prior to Visit   Medication Sig   • allopurinol (ZYLOPRIM) 100 mg tablet TAKE 2 TABLETS BY MOUTH EVERY DAY   • amLODIPine (NORVASC) 5 mg tablet TAKE 1 TABLET (5 MG TOTAL) BY MOUTH DAILY.   • cetirizine-pseudoephedrine (ZyrTEC-D) 5-120 MG per tablet Take 1 tablet by mouth 2 (two) times a day   • colchicine (COLCRYS) 0.6 mg tablet TAKE 1 TABLET BY MOUTH EVERY DAY   • dorzolamide (TRUSOPT) 2 % ophthalmic  "solution 1 drop 3 (three) times a day   • fluticasone (FLONASE) 50 mcg/act nasal spray 1 spray into each nostril daily   • lisinopril (ZESTRIL) 20 mg tablet TAKE 1 TABLET BY MOUTH EVERY DAY   • PARoxetine (PAXIL-CR) 25 mg 24 hr tablet TAKE 1 TABLET BY MOUTH EVERY DAY IN THE MORNING   • PARoxetine (PAXIL-CR) 37.5 mg 24 hr tablet TAKE 1 TABLET BY MOUTH EVERY MORNING.   • prednisoLONE acetate (PRED FORTE) 1 % ophthalmic suspension 1 drop 4 (four) times a day   • Thermometer MISC Use as needed (fever)   • benzonatate (TESSALON PERLES) 100 mg capsule Take 1 capsule (100 mg total) by mouth 3 (three) times a day as needed for cough (Patient not taking: Reported on 5/20/2024)   • brimonidine (ALPHAGAN P) 0.15 % ophthalmic solution 1 drop 3 (three) times a day (Patient not taking: Reported on 12/20/2023)   • oxymetazoline (AFRIN) 0.05 % nasal spray 2 sprays by Each Nare route daily at bedtime for 3 days     Allergies   Allergen Reactions   • Amoxil [Amoxicillin] Rash   • Ciprofloxacin Rash   • Penicillins Rash       There is no immunization history on file for this patient.  Objective   {Disappearing Hyperlinks   Review Vitals * Enter New Vitals * Results Review * Labs * Imaging * Cardiology * Procedures * Lung Cancer Screening :51569}  /90   Pulse 82   Ht 5' 7\" (1.702 m)   Wt 115 kg (254 lb 6.4 oz)   SpO2 99%   BMI 39.84 kg/m²     Physical Exam  Vitals and nursing note reviewed.   Constitutional:       Appearance: He is well-developed.   HENT:      Head: Normocephalic and atraumatic.      Right Ear: Tympanic membrane, ear canal and external ear normal.      Left Ear: Tympanic membrane, ear canal and external ear normal.      Nose: Nose normal.      Mouth/Throat:      Pharynx: No oropharyngeal exudate or posterior oropharyngeal erythema.   Eyes:      Pupils: Pupils are equal, round, and reactive to light.   Cardiovascular:      Rate and Rhythm: Normal rate and regular rhythm.      Heart sounds: Normal heart " sounds. No murmur heard.     No friction rub. No gallop.   Pulmonary:      Effort: Pulmonary effort is normal. No respiratory distress.      Breath sounds: Normal breath sounds. No wheezing or rales.   Musculoskeletal:         General: Normal range of motion.      Cervical back: Normal range of motion and neck supple.   Skin:     General: Skin is warm and dry.   Neurological:      Mental Status: He is alert and oriented to person, place, and time.      Gait: Gait normal.   Psychiatric:         Mood and Affect: Mood is anxious and depressed.         Behavior: Behavior normal.         Thought Content: Thought content normal. Thought content does not include homicidal or suicidal ideation. Thought content does not include homicidal or suicidal plan.         Judgment: Judgment normal.       Administrative Statements {Disappearing Hyperlinks I  Level of Service * [facility]:87454}      Depression Screening Follow-up Plan: Patient's depression screening was positive with a PHQ-2 score of . Their PHQ-9 score was 10. Patient assessed for underlying major depression. They have no active suicidal ideations. Brief counseling provided and recommend additional follow-up/re-evaluation next office visit.

## 2024-05-21 ENCOUNTER — APPOINTMENT (OUTPATIENT)
Dept: LAB | Facility: CLINIC | Age: 44
End: 2024-05-21
Payer: COMMERCIAL

## 2024-05-21 DIAGNOSIS — Z13.21 ENCOUNTER FOR VITAMIN DEFICIENCY SCREENING: ICD-10-CM

## 2024-05-21 DIAGNOSIS — R53.83 OTHER FATIGUE: ICD-10-CM

## 2024-05-21 DIAGNOSIS — Z13.0 SCREENING FOR IRON DEFICIENCY ANEMIA: ICD-10-CM

## 2024-05-21 DIAGNOSIS — Z13.9 SCREENING FOR UNSPECIFIED CONDITION: ICD-10-CM

## 2024-05-21 LAB
25(OH)D3 SERPL-MCNC: 23.4 NG/ML (ref 30–100)
ALBUMIN SERPL BCP-MCNC: 4.5 G/DL (ref 3.5–5)
ALP SERPL-CCNC: 65 U/L (ref 34–104)
ALT SERPL W P-5'-P-CCNC: 25 U/L (ref 7–52)
ANION GAP SERPL CALCULATED.3IONS-SCNC: 6 MMOL/L (ref 4–13)
AST SERPL W P-5'-P-CCNC: 20 U/L (ref 13–39)
BASOPHILS # BLD AUTO: 0.04 THOUSANDS/ÂΜL (ref 0–0.1)
BASOPHILS NFR BLD AUTO: 0 % (ref 0–1)
BILIRUB SERPL-MCNC: 0.68 MG/DL (ref 0.2–1)
BUN SERPL-MCNC: 14 MG/DL (ref 5–25)
CALCIUM SERPL-MCNC: 9.3 MG/DL (ref 8.4–10.2)
CHLORIDE SERPL-SCNC: 102 MMOL/L (ref 96–108)
CO2 SERPL-SCNC: 29 MMOL/L (ref 21–32)
CREAT SERPL-MCNC: 0.99 MG/DL (ref 0.6–1.3)
EOSINOPHIL # BLD AUTO: 0.11 THOUSAND/ÂΜL (ref 0–0.61)
EOSINOPHIL NFR BLD AUTO: 1 % (ref 0–6)
ERYTHROCYTE [DISTWIDTH] IN BLOOD BY AUTOMATED COUNT: 12.5 % (ref 11.6–15.1)
EST. AVERAGE GLUCOSE BLD GHB EST-MCNC: 108 MG/DL
FERRITIN SERPL-MCNC: 257 NG/ML (ref 24–336)
GFR SERPL CREATININE-BSD FRML MDRD: 92 ML/MIN/1.73SQ M
GLUCOSE P FAST SERPL-MCNC: 93 MG/DL (ref 65–99)
HBA1C MFR BLD: 5.4 %
HCT VFR BLD AUTO: 46 % (ref 36.5–49.3)
HGB BLD-MCNC: 15.6 G/DL (ref 12–17)
IMM GRANULOCYTES # BLD AUTO: 0.05 THOUSAND/UL (ref 0–0.2)
IMM GRANULOCYTES NFR BLD AUTO: 1 % (ref 0–2)
IRON SATN MFR SERPL: 37 % (ref 15–50)
IRON SERPL-MCNC: 125 UG/DL (ref 50–212)
LYMPHOCYTES # BLD AUTO: 2.5 THOUSANDS/ÂΜL (ref 0.6–4.47)
LYMPHOCYTES NFR BLD AUTO: 27 % (ref 14–44)
MCH RBC QN AUTO: 30.2 PG (ref 26.8–34.3)
MCHC RBC AUTO-ENTMCNC: 33.9 G/DL (ref 31.4–37.4)
MCV RBC AUTO: 89 FL (ref 82–98)
MONOCYTES # BLD AUTO: 0.64 THOUSAND/ÂΜL (ref 0.17–1.22)
MONOCYTES NFR BLD AUTO: 7 % (ref 4–12)
NEUTROPHILS # BLD AUTO: 5.89 THOUSANDS/ÂΜL (ref 1.85–7.62)
NEUTS SEG NFR BLD AUTO: 64 % (ref 43–75)
NRBC BLD AUTO-RTO: 0 /100 WBCS
PLATELET # BLD AUTO: 270 THOUSANDS/UL (ref 149–390)
PMV BLD AUTO: 10.5 FL (ref 8.9–12.7)
POTASSIUM SERPL-SCNC: 4.3 MMOL/L (ref 3.5–5.3)
PROT SERPL-MCNC: 7.3 G/DL (ref 6.4–8.4)
RBC # BLD AUTO: 5.17 MILLION/UL (ref 3.88–5.62)
SODIUM SERPL-SCNC: 137 MMOL/L (ref 135–147)
TIBC SERPL-MCNC: 338 UG/DL (ref 250–450)
TSH SERPL DL<=0.05 MIU/L-ACNC: 2.87 UIU/ML (ref 0.45–4.5)
UIBC SERPL-MCNC: 213 UG/DL (ref 155–355)
WBC # BLD AUTO: 9.23 THOUSAND/UL (ref 4.31–10.16)

## 2024-05-21 PROCEDURE — 83540 ASSAY OF IRON: CPT

## 2024-05-21 PROCEDURE — 83036 HEMOGLOBIN GLYCOSYLATED A1C: CPT

## 2024-05-21 PROCEDURE — 83550 IRON BINDING TEST: CPT

## 2024-05-21 PROCEDURE — 36415 COLL VENOUS BLD VENIPUNCTURE: CPT

## 2024-05-21 PROCEDURE — 84443 ASSAY THYROID STIM HORMONE: CPT

## 2024-05-21 PROCEDURE — 82728 ASSAY OF FERRITIN: CPT

## 2024-05-21 PROCEDURE — 85025 COMPLETE CBC W/AUTO DIFF WBC: CPT

## 2024-05-21 PROCEDURE — 82306 VITAMIN D 25 HYDROXY: CPT

## 2024-05-21 PROCEDURE — 86618 LYME DISEASE ANTIBODY: CPT

## 2024-05-21 PROCEDURE — 80053 COMPREHEN METABOLIC PANEL: CPT

## 2024-05-22 DIAGNOSIS — E55.9 VITAMIN D DEFICIENCY: Primary | ICD-10-CM

## 2024-05-22 LAB — B BURGDOR IGG+IGM SER QL IA: NEGATIVE

## 2024-05-22 RX ORDER — ERGOCALCIFEROL 1.25 MG/1
50000 CAPSULE ORAL WEEKLY
Qty: 12 CAPSULE | Refills: 0 | Status: SHIPPED | OUTPATIENT
Start: 2024-05-22

## 2024-05-22 NOTE — TELEPHONE ENCOUNTER
PA for PARoxetine (PAXIL-CR) 25 mg      Submitted via    []CMM-KEY   [x][facility]afer-Case ID #   []Faxed to plan   []Other website   []Phone call Case ID #     Office notes sent, clinical questions answered. Awaiting determination    Turnaround time for your insurance to make a decision on your Prior Authorization can take 7-21 business days.

## 2024-05-22 NOTE — TELEPHONE ENCOUNTER
PA for PARoxetine (PAXIL-CR) 37.5 mg     Submitted via    [][employer]-KEY   [x][employer]-Case ID #   []Faxed to plan   []Other website   []Phone call Case ID #     Office notes sent, clinical questions answered. Awaiting determination    Turnaround time for your insurance to make a decision on your Prior Authorization can take 7-21 business days.

## 2024-05-23 DIAGNOSIS — Z13.220 SCREENING FOR HYPERLIPIDEMIA: Primary | ICD-10-CM

## 2024-05-23 DIAGNOSIS — R53.83 OTHER FATIGUE: ICD-10-CM

## 2024-05-23 NOTE — TELEPHONE ENCOUNTER
PA for PARoxetine (PAXIL-CR) 25 mg   Denied    Reason:          Message sent to provider pool Yes    Denial letter scanned into Media Yes    Appeal started No    (Provider will need to decide if appeal is warranted and send clinical documentation to PA team for initiation.)

## 2024-05-24 DIAGNOSIS — I10 ESSENTIAL HYPERTENSION: ICD-10-CM

## 2024-05-24 DIAGNOSIS — M10.9 GOUT, UNSPECIFIED CAUSE, UNSPECIFIED CHRONICITY, UNSPECIFIED SITE: ICD-10-CM

## 2024-05-24 RX ORDER — LISINOPRIL 20 MG/1
TABLET ORAL
Qty: 90 TABLET | Refills: 1 | Status: SHIPPED | OUTPATIENT
Start: 2024-05-24

## 2024-05-24 RX ORDER — AMLODIPINE BESYLATE 5 MG/1
5 TABLET ORAL DAILY
Qty: 90 TABLET | Refills: 1 | Status: SHIPPED | OUTPATIENT
Start: 2024-05-24

## 2024-05-24 RX ORDER — ALLOPURINOL 100 MG/1
TABLET ORAL
Qty: 180 TABLET | Refills: 1 | Status: SHIPPED | OUTPATIENT
Start: 2024-05-24

## 2024-05-24 NOTE — TELEPHONE ENCOUNTER
PA for PARoxetine (PAXIL-CR) 37.5 mg  Denied    Reason:          Message sent to provider pool Yes    Denial letter scanned into Media Yes    Appeal started No    (Provider will need to decide if appeal is warranted and send clinical documentation to PA team for initiation.)

## 2024-06-06 ENCOUNTER — APPOINTMENT (OUTPATIENT)
Dept: LAB | Facility: CLINIC | Age: 44
End: 2024-06-06
Payer: COMMERCIAL

## 2024-06-06 DIAGNOSIS — R53.83 OTHER FATIGUE: ICD-10-CM

## 2024-06-06 DIAGNOSIS — Z13.220 SCREENING FOR HYPERLIPIDEMIA: ICD-10-CM

## 2024-06-06 DIAGNOSIS — E55.9 VITAMIN D DEFICIENCY: ICD-10-CM

## 2024-06-06 LAB
25(OH)D3 SERPL-MCNC: 26.4 NG/ML (ref 30–100)
TESTOST SERPL-MSCNC: 287 NG/DL

## 2024-06-06 PROCEDURE — 80061 LIPID PANEL: CPT

## 2024-06-06 PROCEDURE — 84403 ASSAY OF TOTAL TESTOSTERONE: CPT

## 2024-06-06 PROCEDURE — 36415 COLL VENOUS BLD VENIPUNCTURE: CPT

## 2024-06-06 PROCEDURE — 82306 VITAMIN D 25 HYDROXY: CPT

## 2024-06-07 ENCOUNTER — AMB VIDEO VISIT (OUTPATIENT)
Dept: OTHER | Facility: HOSPITAL | Age: 44
End: 2024-06-07
Payer: COMMERCIAL

## 2024-06-07 DIAGNOSIS — E78.2 MIXED HYPERLIPIDEMIA: ICD-10-CM

## 2024-06-07 DIAGNOSIS — F32.A ANXIETY AND DEPRESSION: Primary | ICD-10-CM

## 2024-06-07 DIAGNOSIS — F41.9 ANXIETY AND DEPRESSION: Primary | ICD-10-CM

## 2024-06-07 LAB
CHOLEST SERPL-MCNC: 218 MG/DL
HDLC SERPL-MCNC: 39 MG/DL
LDLC SERPL CALC-MCNC: 125 MG/DL (ref 0–100)
NONHDLC SERPL-MCNC: 179 MG/DL
TRIGL SERPL-MCNC: 272 MG/DL

## 2024-06-07 PROCEDURE — 99212 OFFICE O/P EST SF 10 MIN: CPT | Performed by: PHYSICIAN ASSISTANT

## 2024-06-07 NOTE — LETTER
June 7, 2024     Patient: Kaushal Hernandez   YOB: 1980   Date of Visit: 6/7/2024       To Whom it May Concern:    Kaushal Hernandez is under my professional care. He was seen virtually on 6/7/2024. He may return to work on 6/10/24 .    If you have any questions or concerns, please don't hesitate to call.         Sincerely,          Shannon D Severino, PA-C        CC: No Recipients

## 2024-06-07 NOTE — PATIENT INSTRUCTIONS
"[facility] Phone number is [phone] if you need assistance or have further questions  note in \"Letters\" section of [employer]. Can print if opened from a web browser    You can use this link to find a psychiatrist within the [facility]. I placed a referral for you which is in the system already. [email or web address]    Try calling the number on the back of your insurance card to find psychiatrists and therapists who accept your insurance. You can also try [email or web address] or [email or web address] . If you develop any severe anxiety, depression, suicidal or homicidal thoughts, please go to your closest emergency department as soon as possible.  Cholesterol and Your Health   AMBULATORY CARE:   Cholesterol  is a waxy, fat-like substance. Your body uses cholesterol to make hormones and new cells, and to protect nerves. Cholesterol is made by your body. It also comes from certain foods you eat, such as meat and dairy products. Your healthcare provider can help you set goals for your cholesterol levels. Your provider can help you create a plan to meet your goals.  Cholesterol level goals:  Your cholesterol level goals depend on your risk for heart disease, your age, and your other health conditions. The following are general guidelines:  Total cholesterol  includes low-density lipoprotein (LDL), high-density lipoprotein (HDL), and triglyceride levels. The total cholesterol level should be lower than 200 mg/dL and is best at about 150 mg/dL.    LDL cholesterol  is called bad cholesterol  because it forms plaque in your arteries. As plaque builds up, your arteries become narrow, and less blood flows through. When plaque decreases blood flow to your heart, you may have chest pain. If plaque completely blocks an artery that brings blood to your heart, you may have a heart attack. Plaque can break off and form blood clots. Blood clots may block arteries in your brain and cause " a stroke. The level should be less than 130 mg/dL and is best at about 100 mg/dL.         HDL cholesterol  is called good cholesterol  because it helps remove LDL cholesterol from your arteries. It does this by attaching to LDL cholesterol and carrying it to your liver. Your liver breaks down LDL cholesterol so your body can get rid of it. High levels of HDL cholesterol can help prevent a heart attack and stroke. Low levels of HDL cholesterol can increase your risk for heart disease, heart attack, and stroke. The level should be at least 40 mg/dL in males or at least 50 mg/dL in females.    Triglycerides  are a type of fat that store energy from foods you eat. High levels of triglycerides also cause plaque buildup. This can increase your risk for a heart attack or stroke. If your triglyceride level is high, your LDL cholesterol level may also be high. The level should be less than 150 mg/dL.    Any of the following can increase your risk for high cholesterol:   Smoking or drinking large amounts of alcohol    Having overweight or obesity, or not getting enough exercise    A medical condition such as hypertension (high blood pressure) or diabetes    A family history of high cholesterol    Age older than 65    What you need to know about having your cholesterol levels checked:  Adults 20 to 45 years of age should have their cholesterol levels checked every 4 to 6 years. Adults 45 years or older should have their cholesterol checked every 1 to 2 years. You may need your cholesterol checked more often, or at a younger age, if you have risk factors for heart disease. You may also need to have your cholesterol checked more often if you have other health conditions, such as diabetes. Blood tests are used to check cholesterol levels. Blood tests measure your levels of triglycerides, LDL cholesterol, and HDL cholesterol.  How healthy fats affect your cholesterol levels:  Healthy fats, also called unsaturated fats, help lower  LDL cholesterol and triglyceride levels. Healthy fats include the following:  Monounsaturated fats  are found in foods such as olive oil, canola oil, avocado, nuts, and olives.    Polyunsaturated fats,  such as omega 3 fats, are found in fish, such as salmon, trout, and tuna. They can also be found in plant foods such as flaxseed, walnuts, and soybeans.    How unhealthy fats affect your cholesterol levels:  Unhealthy fats increase LDL cholesterol and triglyceride levels. They are found in foods high in cholesterol, saturated fat, and trans fat:  Cholesterol  is found in eggs, dairy, and meat.    Saturated fat  is found in butter, cheese, ice cream, whole milk, and coconut oil. Saturated fat is also found in meat, such as sausage, hot dogs, and bologna.    Trans fat  is found in liquid oils and is used in fried and baked foods. Foods that contain trans fats include chips, crackers, muffins, sweet rolls, microwave popcorn, and cookies.    Treatment  for high cholesterol will also decrease your risk of heart disease, heart attack, and stroke. Treatment may include any of the following:  Lifestyle changes  may include food, exercise, weight loss, and quitting smoking. You may also need to decrease the amount of alcohol you drink. Your healthcare provider will want you to start with lifestyle changes. Other treatment may be added if lifestyle changes are not enough. Your healthcare provider may recommend you work with a team to manage hyperlipidemia. The team may include medical experts such as a dietitian, an exercise or physical therapist, and a behavior therapist. Your family members may be included in helping you create lifestyle changes.    Medicines  may be given to lower your LDL cholesterol, triglyceride levels, or total cholesterol level. You may need medicines to lower your cholesterol if any of the following is true:    You have a history of stroke, TIA, unstable angina, or a heart attack.    Your LDL  cholesterol level is 190 mg/dL or higher.    You are age 40 to 75 years, have diabetes or heart disease risk factors, and your LDL cholesterol is 70 mg/dL or higher.    Supplements  include fish oil, red yeast rice, and garlic. Fish oil may help lower your triglyceride and LDL cholesterol levels. It may also increase your HDL cholesterol level. Red yeast rice may help decrease your total cholesterol level and LDL cholesterol level. Garlic may help lower your total cholesterol level. Do not take any supplements without talking to your healthcare provider.    Food changes you can make to lower your cholesterol levels:  A dietitian can help you create a healthy eating plan. Your dietitian can show you how to read food labels and choose foods low in saturated fat, trans fats, and cholesterol.     Decrease the total amount of fat you eat.  Choose lean meats, fat-free or 1% fat milk, and low-fat dairy products, such as yogurt and cheese. Try to limit or avoid red meats. Limit or do not eat fried foods or baked goods, such as cookies.    Replace unhealthy fats with healthy fats.  Cook foods in olive oil or canola oil. Choose soft margarines that are low in saturated fat and trans fat. Seeds, nuts, and avocados are other examples of healthy fats.    Eat foods with omega-3 fats.  Examples include salmon, tuna, mackerel, walnuts, and flaxseed. Eat fish 2 times per week. Pregnant women should not eat fish that have high levels of mercury, such as shark, swordfish, and [place] mackerel.         Increase the amount of high-fiber foods you eat.  High-fiber foods can help lower your LDL cholesterol. Aim to get between 20 and 30 grams of fiber each day. Fruits and vegetables are high in fiber. Eat at least 5 servings each day. Other high-fiber foods are whole-grain or whole-wheat breads, pastas, or cereals, and brown rice. Eat 3 ounces of whole-grain foods each day. Increase fiber slowly. You may have abdominal discomfort, bloating,  and gas if you add fiber to your diet too quickly.         Eat healthy protein foods.  Examples include low-fat dairy products, skinless chicken and turkey, fish, and nuts.    Limit foods and drinks that are high in sugar.  Your dietitian or healthcare provider can help you create daily limits for high-sugar foods and drinks. The limit may be lower if you have diabetes or another health condition. Limits can also help you lose weight if needed.  Lifestyle changes you can make to lower your cholesterol levels:   Maintain a healthy weight.  Ask your healthcare provider what a healthy weight is for you. Ask your provider to help you create a weight loss plan if needed. Weight loss can decrease your total cholesterol and triglyceride levels. Weight loss may also help keep your blood pressure at a healthy level.    Be physically active throughout the day.  Physical activity, such as exercise, can help lower your total cholesterol level and maintain a healthy weight. Physical activity can also help increase your HDL cholesterol level. Work with your healthcare provider to create an program that is right for you. Get at least 30 to 40 minutes of moderate physical activity most days of the week. Examples of exercise include brisk walking, swimming, or biking. Also include strength training at least 2 times each week. Your healthcare providers can help you create a physical activity plan.            Do not smoke.  Nicotine and other chemicals in cigarettes and cigars can raise your cholesterol levels. Ask your healthcare provider for information if you currently smoke and need help to quit. E-cigarettes or smokeless tobacco still contain nicotine. Talk to your healthcare provider before you use these products.         Limit or do not drink alcohol.  Alcohol can increase your triglyceride levels. Ask your healthcare provider before you drink alcohol. Ask how much is okay for you to drink in 24 hours or 1 week.    Follow up  with your doctor as directed:  Write down your questions so you remember to ask them during your visits.  © Copyright [employer] 2023 Information is for End User's use only and may not be sold, redistributed or otherwise used for commercial purposes.  The above information is an  only. It is not intended as medical advice for individual conditions or treatments. Talk to your doctor, nurse or pharmacist before following any medical regimen to see if it is safe and effective for you.

## 2024-06-07 NOTE — PROGRESS NOTES
Required Documentation:  Encounter provider: Shannon D Severino, PA-C    Identify all parties in room with patient during virtual visit:  No one else    The patient was identified by name and date of birth. Kaushal Hernandez was informed that this is a telemedicine visit and that the visit is being conducted through the [employer] platform. He agrees to proceed..  My office door was closed. No one else was in the room.  He acknowledged consent and understanding of privacy and security of the video platform. The patient has agreed to participate and understands they can discontinue the visit at any time.    Verification of patient location:  Patient is located at Home in the following state in which I hold an active license [place]    Patient is aware this is a billable service.     Reason for visit is No chief complaint on file.       Subjective  HPI   Has PCP appt Monday. Pt reports he was in to see his PCP 2 weeks ago. Has been working 84 hour work weeks. Has PTSD and trouble sleeping, depression and anxiety. Had labs done. Cholesterol was high which is concerning due to his FHx CAD. Nothing has changed with his sx. Today he felt like he just didn't want to wake up and go to work. He is supposed to work today tomorrow and Sunday. Does not drink ETOH.    Past Medical History:   Diagnosis Date    Anxiety     Blindness of left eye     Knee pain, right        Past Surgical History:   Procedure Laterality Date    EYE SURGERY      FL INJECTION RIGHT KNEE (ARTHROGRAM)  5/21/2019    HAND SURGERY      NECK SURGERY      WRIST SURGERY          Allergies   Allergen Reactions    Amoxil [Amoxicillin] Rash    Ciprofloxacin Rash    Penicillins Rash       Review of Systems   Constitutional:  Negative for fever.   HENT:  Negative for nosebleeds.    Eyes:  Negative for redness.   Respiratory:  Negative for shortness of breath.    Cardiovascular:  Negative for chest pain.   Gastrointestinal:  Negative for blood in stool.  "  Genitourinary:  Negative for hematuria.   Musculoskeletal:  Negative for gait problem.   Skin:  Negative for rash.   Neurological:  Negative for seizures.   Psychiatric/Behavioral:  Positive for sleep disturbance. Negative for behavioral problems. The patient is nervous/anxious.        Video Exam    There were no vitals filed for this visit.    Physical Exam  Constitutional:       General: He is not in acute distress.     Appearance: Normal appearance. He is obese. He is not toxic-appearing.   HENT:      Head: Normocephalic and atraumatic.      Nose: No rhinorrhea.      Mouth/Throat:      Mouth: Mucous membranes are moist.   Eyes:      Conjunctiva/sclera: Conjunctivae normal.      Comments: glasses   Pulmonary:      Effort: Pulmonary effort is normal. No respiratory distress.      Breath sounds: No wheezing (no gross audible wheeze through computer).   Musculoskeletal:      Cervical back: Normal range of motion.   Skin:     Findings: No rash (on face or neck).   Neurological:      Mental Status: He is alert.      Cranial Nerves: No dysarthria or facial asymmetry.   Psychiatric:         Mood and Affect: Mood normal.         Behavior: Behavior normal.         Visit Time  Total Visit Duration: 19 minutes    Assessment/Plan:    Diagnoses and all orders for this visit:    Anxiety and depression  -     Ambulatory referral to Psych Services; Future    Mixed hyperlipidemia        Patient Instructions   Care Anywhere Phone number is [phone] if you need assistance or have further questions  note in \"Letters\" section of Sendmail navjot. Can print if opened from a web browser    You can use this link to find a psychiatrist within the [facility]. I placed a referral for you which is in the system already. [email or web address]    Try calling the number on the back of your insurance card to find psychiatrists and therapists who accept your insurance. You can also try " [email or web address] or [email or web address] . If you develop any severe anxiety, depression, suicidal or homicidal thoughts, please go to your closest emergency department as soon as possible.  Cholesterol and Your Health   AMBULATORY CARE:   Cholesterol  is a waxy, fat-like substance. Your body uses cholesterol to make hormones and new cells, and to protect nerves. Cholesterol is made by your body. It also comes from certain foods you eat, such as meat and dairy products. Your healthcare provider can help you set goals for your cholesterol levels. Your provider can help you create a plan to meet your goals.  Cholesterol level goals:  Your cholesterol level goals depend on your risk for heart disease, your age, and your other health conditions. The following are general guidelines:  Total cholesterol  includes low-density lipoprotein (LDL), high-density lipoprotein (HDL), and triglyceride levels. The total cholesterol level should be lower than 200 mg/dL and is best at about 150 mg/dL.    LDL cholesterol  is called bad cholesterol  because it forms plaque in your arteries. As plaque builds up, your arteries become narrow, and less blood flows through. When plaque decreases blood flow to your heart, you may have chest pain. If plaque completely blocks an artery that brings blood to your heart, you may have a heart attack. Plaque can break off and form blood clots. Blood clots may block arteries in your brain and cause a stroke. The level should be less than 130 mg/dL and is best at about 100 mg/dL.         HDL cholesterol  is called good cholesterol  because it helps remove LDL cholesterol from your arteries. It does this by attaching to LDL cholesterol and carrying it to your liver. Your liver breaks down LDL cholesterol so your body can get rid of it. High levels of HDL cholesterol can help prevent a heart attack and stroke. Low levels of HDL cholesterol can increase your risk for heart disease, heart attack, and stroke.  The level should be at least 40 mg/dL in males or at least 50 mg/dL in females.    Triglycerides  are a type of fat that store energy from foods you eat. High levels of triglycerides also cause plaque buildup. This can increase your risk for a heart attack or stroke. If your triglyceride level is high, your LDL cholesterol level may also be high. The level should be less than 150 mg/dL.    Any of the following can increase your risk for high cholesterol:   Smoking or drinking large amounts of alcohol    Having overweight or obesity, or not getting enough exercise    A medical condition such as hypertension (high blood pressure) or diabetes    A family history of high cholesterol    Age older than 65    What you need to know about having your cholesterol levels checked:  Adults 20 to 45 years of age should have their cholesterol levels checked every 4 to 6 years. Adults 45 years or older should have their cholesterol checked every 1 to 2 years. You may need your cholesterol checked more often, or at a younger age, if you have risk factors for heart disease. You may also need to have your cholesterol checked more often if you have other health conditions, such as diabetes. Blood tests are used to check cholesterol levels. Blood tests measure your levels of triglycerides, LDL cholesterol, and HDL cholesterol.  How healthy fats affect your cholesterol levels:  Healthy fats, also called unsaturated fats, help lower LDL cholesterol and triglyceride levels. Healthy fats include the following:  Monounsaturated fats  are found in foods such as olive oil, canola oil, avocado, nuts, and olives.    Polyunsaturated fats,  such as omega 3 fats, are found in fish, such as salmon, trout, and tuna. They can also be found in plant foods such as flaxseed, walnuts, and soybeans.    How unhealthy fats affect your cholesterol levels:  Unhealthy fats increase LDL cholesterol and triglyceride levels. They are found in foods high in  cholesterol, saturated fat, and trans fat:  Cholesterol  is found in eggs, dairy, and meat.    Saturated fat  is found in butter, cheese, ice cream, whole milk, and coconut oil. Saturated fat is also found in meat, such as sausage, hot dogs, and bologna.    Trans fat  is found in liquid oils and is used in fried and baked foods. Foods that contain trans fats include chips, crackers, muffins, sweet rolls, microwave popcorn, and cookies.    Treatment  for high cholesterol will also decrease your risk of heart disease, heart attack, and stroke. Treatment may include any of the following:  Lifestyle changes  may include food, exercise, weight loss, and quitting smoking. You may also need to decrease the amount of alcohol you drink. Your healthcare provider will want you to start with lifestyle changes. Other treatment may be added if lifestyle changes are not enough. Your healthcare provider may recommend you work with a team to manage hyperlipidemia. The team may include medical experts such as a dietitian, an exercise or physical therapist, and a behavior therapist. Your family members may be included in helping you create lifestyle changes.    Medicines  may be given to lower your LDL cholesterol, triglyceride levels, or total cholesterol level. You may need medicines to lower your cholesterol if any of the following is true:    You have a history of stroke, TIA, unstable angina, or a heart attack.    Your LDL cholesterol level is 190 mg/dL or higher.    You are age 40 to 75 years, have diabetes or heart disease risk factors, and your LDL cholesterol is 70 mg/dL or higher.    Supplements  include fish oil, red yeast rice, and garlic. Fish oil may help lower your triglyceride and LDL cholesterol levels. It may also increase your HDL cholesterol level. Red yeast rice may help decrease your total cholesterol level and LDL cholesterol level. Garlic may help lower your total cholesterol level. Do not take any supplements  without talking to your healthcare provider.    Food changes you can make to lower your cholesterol levels:  A dietitian can help you create a healthy eating plan. Your dietitian can show you how to read food labels and choose foods low in saturated fat, trans fats, and cholesterol.     Decrease the total amount of fat you eat.  Choose lean meats, fat-free or 1% fat milk, and low-fat dairy products, such as yogurt and cheese. Try to limit or avoid red meats. Limit or do not eat fried foods or baked goods, such as cookies.    Replace unhealthy fats with healthy fats.  Cook foods in olive oil or canola oil. Choose soft margarines that are low in saturated fat and trans fat. Seeds, nuts, and avocados are other examples of healthy fats.    Eat foods with omega-3 fats.  Examples include salmon, tuna, mackerel, walnuts, and flaxseed. Eat fish 2 times per week. Pregnant women should not eat fish that have high levels of mercury, such as shark, swordfish, and hilton mackerel.         Increase the amount of high-fiber foods you eat.  High-fiber foods can help lower your LDL cholesterol. Aim to get between 20 and 30 grams of fiber each day. Fruits and vegetables are high in fiber. Eat at least 5 servings each day. Other high-fiber foods are whole-grain or whole-wheat breads, pastas, or cereals, and brown rice. Eat 3 ounces of whole-grain foods each day. Increase fiber slowly. You may have abdominal discomfort, bloating, and gas if you add fiber to your diet too quickly.         Eat healthy protein foods.  Examples include low-fat dairy products, skinless chicken and turkey, fish, and nuts.    Limit foods and drinks that are high in sugar.  Your dietitian or healthcare provider can help you create daily limits for high-sugar foods and drinks. The limit may be lower if you have diabetes or another health condition. Limits can also help you lose weight if needed.  Lifestyle changes you can make to lower your cholesterol levels:    Maintain a healthy weight.  Ask your healthcare provider what a healthy weight is for you. Ask your provider to help you create a weight loss plan if needed. Weight loss can decrease your total cholesterol and triglyceride levels. Weight loss may also help keep your blood pressure at a healthy level.    Be physically active throughout the day.  Physical activity, such as exercise, can help lower your total cholesterol level and maintain a healthy weight. Physical activity can also help increase your HDL cholesterol level. Work with your healthcare provider to create an program that is right for you. Get at least 30 to 40 minutes of moderate physical activity most days of the week. Examples of exercise include brisk walking, swimming, or biking. Also include strength training at least 2 times each week. Your healthcare providers can help you create a physical activity plan.            Do not smoke.  Nicotine and other chemicals in cigarettes and cigars can raise your cholesterol levels. Ask your healthcare provider for information if you currently smoke and need help to quit. E-cigarettes or smokeless tobacco still contain nicotine. Talk to your healthcare provider before you use these products.         Limit or do not drink alcohol.  Alcohol can increase your triglyceride levels. Ask your healthcare provider before you drink alcohol. Ask how much is okay for you to drink in 24 hours or 1 week.    Follow up with your doctor as directed:  Write down your questions so you remember to ask them during your visits.  © Copyright [employer] 2023 Information is for End User's use only and may not be sold, redistributed or otherwise used for commercial purposes.  The above information is an  only. It is not intended as medical advice for individual conditions or treatments. Talk to your doctor, nurse or pharmacist before following any medical regimen to see if it is safe and effective for you.

## 2024-06-10 NOTE — CARE ANYWHERE EVISITS
Visit Summary for KIMBERLY TINOCO - Gender: Male - Date of Birth: 1980  Date: [id number] - Duration: 18 minutes  Patient: KIMBERLY TINOCO  Provider: Shannon Severino PA-C    Patient Contact Information  Address  [address] [place]; [place] [address]  [phone]    Visit Topics  Stress / Anxiety [Added By: Self - 2024-06-07]  Depression/fatigue/health [Added By: Self - 2024-06-07]    Triage Questions   What is your current physical address in the event of a medical emergency? Answer []  Are you allergic to any medications? Answer []  Are you now or could you be pregnant? Answer []  Do you have any immune system compromise or chronic lung   disease? Answer []  Do you have any vulnerable family members in the home (infant, pregnant, cancer, elderly)? Answer []     Conversation Transcripts  [0A][0A] [Notification] You are connected with Shannon Severino PA-C, Urgent Care Specialist.[0A][Notification] KIMBERLY TINOCO is located in [place].[0A][Notification] KIMBERLY TINOCO has shared health history...[0A]    Diagnosis  Anxiety disorder, unspecified  Depression, unspecified  Mixed hyperlipidemia    Procedures  Value: [id number] Code: CPT-4 UNLISTED E&M SERVICE    Medications Prescribed    No prescriptions ordered    Electronically signed by: Severino PA-C, Shannon(NPI [id number])

## 2024-06-13 ENCOUNTER — OFFICE VISIT (OUTPATIENT)
Dept: FAMILY MEDICINE CLINIC | Facility: CLINIC | Age: 44
End: 2024-06-13
Payer: COMMERCIAL

## 2024-06-13 VITALS
WEIGHT: 250.1 LBS | BODY MASS INDEX: 39.25 KG/M2 | DIASTOLIC BLOOD PRESSURE: 68 MMHG | OXYGEN SATURATION: 98 % | SYSTOLIC BLOOD PRESSURE: 128 MMHG | HEIGHT: 67 IN | HEART RATE: 70 BPM

## 2024-06-13 DIAGNOSIS — I10 ESSENTIAL HYPERTENSION: ICD-10-CM

## 2024-06-13 DIAGNOSIS — F43.10 PTSD (POST-TRAUMATIC STRESS DISORDER): ICD-10-CM

## 2024-06-13 DIAGNOSIS — F41.1 GENERALIZED ANXIETY DISORDER: ICD-10-CM

## 2024-06-13 DIAGNOSIS — F33.1 MODERATE EPISODE OF RECURRENT MAJOR DEPRESSIVE DISORDER (HCC): Primary | ICD-10-CM

## 2024-06-13 DIAGNOSIS — E78.5 HYPERLIPIDEMIA, UNSPECIFIED HYPERLIPIDEMIA TYPE: ICD-10-CM

## 2024-06-13 DIAGNOSIS — E55.9 VITAMIN D DEFICIENCY: ICD-10-CM

## 2024-06-13 PROCEDURE — 99214 OFFICE O/P EST MOD 30 MIN: CPT | Performed by: PHYSICIAN ASSISTANT

## 2024-06-13 NOTE — ASSESSMENT & PLAN NOTE
We discussed labs.  Cholesterol is elevated.  He would like to work on diet and exercise first.  We will recheck labs in 3 months

## 2024-06-13 NOTE — ASSESSMENT & PLAN NOTE
Continue Paxil CR.  Patient is scheduled to establish with psychotherapist in 2 weeks.  Follow-up in 3 months or sooner if needed

## 2024-06-13 NOTE — PROGRESS NOTES
Ambulatory Visit  Name: Kaushal Hernandez      : 1980      MRN: [id number]  Encounter Provider: Micaela Day PA-C  Encounter Date: 2024   Encounter department: [facility]    Assessment & Plan   1. Moderate episode of recurrent major depressive disorder (HCC)  Assessment & Plan:  Continue Paxil CR.  Patient is scheduled to establish with psychotherapist in 2 weeks.  Follow-up in 3 months or sooner if needed  2. Generalized anxiety disorder  Assessment & Plan:  Continue Paxil CR.  Patient is scheduled to establish with psychotherapist in 2 weeks.  Follow-up in 3 months or sooner if needed  3. PTSD (post-traumatic stress disorder)  Assessment & Plan:  Continue Paxil CR.  Patient is scheduled to establish with psychotherapist in 2 weeks.  Follow-up in 3 months or sooner if needed  4. Essential hypertension  Assessment & Plan:  Blood pressure is stable.  Continue lisinopril 20 mg daily and amlodipine 5 mg daily  5. Hyperlipidemia, unspecified hyperlipidemia type  Assessment & Plan:  We discussed labs.  Cholesterol is elevated.  He would like to work on diet and exercise first.  We will recheck labs in 3 months  Orders:  -     Comprehensive metabolic panel; Future; Expected date: 2024  -     Lipid panel; Future; Expected date: 2024  6. Vitamin D deficiency  Assessment & Plan:  Continue vitamin D supplementation.  Recheck labs in 3 months  Orders:  -     Vitamin D 25 hydroxy; Future; Expected date: 2024       History of Present Illness   {Disappearing Hyperlinks I Encounters * My Last Note * Since Last Visit * History :09445}  Kaushal is a very pleasant 44-year-old male who is here today for a follow-up.  He would like to review his most recent labs.  He admits that his mental health has been okay.  He has an appointment on 24 at [facility] with his previous therapist.  He is excited to start therapy, because this helped him in the past.  He denies any suicidal or homicidal  "thoughts.  He admits that he is feeling okay on Paxil.  He admits that his diet is not very good.  He admits that he eats a lot of sweets and bread.  He does not exercise regularly.        Review of Systems   Constitutional:  Negative for chills, diaphoresis, fatigue and fever.   HENT:  Negative for congestion, ear pain, postnasal drip, rhinorrhea, sneezing, sore throat and trouble swallowing.    Eyes:  Negative for pain and visual disturbance.   Respiratory:  Negative for apnea, cough, shortness of breath and wheezing.    Cardiovascular:  Negative for chest pain and palpitations.   Gastrointestinal:  Negative for abdominal pain, constipation, diarrhea, nausea and vomiting.   Genitourinary:  Negative for dysuria and hematuria.   Musculoskeletal:  Negative for arthralgias, gait problem and myalgias.   Neurological:  Negative for dizziness, syncope, weakness, light-headedness, numbness and headaches.   Psychiatric/Behavioral:  Positive for dysphoric mood. Negative for suicidal ideas. The patient is nervous/anxious.        Objective   {Disappearing Hyperlinks   Review Vitals * Enter New Vitals * Results Review * Labs * Imaging * Cardiology * Procedures * Lung Cancer Screening :02739}  /68   Pulse 70   Ht 5' 7\" (1.702 m)   Wt 113 kg (250 lb 1.6 oz)   SpO2 98%   BMI 39.17 kg/m²     Physical Exam  Vitals and nursing note reviewed.   Constitutional:       Appearance: He is well-developed.   HENT:      Head: Normocephalic and atraumatic.      Right Ear: External ear normal.      Left Ear: External ear normal.      Nose: Nose normal.      Mouth/Throat:      Pharynx: No oropharyngeal exudate or posterior oropharyngeal erythema.   Eyes:      Extraocular Movements: Extraocular movements intact.   Cardiovascular:      Rate and Rhythm: Normal rate and regular rhythm.      Heart sounds: Normal heart sounds. No murmur heard.     No friction rub. No gallop.   Pulmonary:      Effort: Pulmonary effort is normal. No " respiratory distress.      Breath sounds: Normal breath sounds. No wheezing or rales.   Musculoskeletal:         General: Normal range of motion.      Cervical back: Normal range of motion and neck supple.   Skin:     General: Skin is warm and dry.   Neurological:      Mental Status: He is alert and oriented to person, place, and time.   Psychiatric:         Mood and Affect: Mood is anxious and depressed.         Behavior: Behavior normal.         Thought Content: Thought content normal. Thought content does not include homicidal or suicidal ideation. Thought content does not include homicidal or suicidal plan.         Judgment: Judgment normal.       Administrative Statements {Disappearing Hyperlinks I  [id number]}

## 2024-06-13 NOTE — LETTER
June 13, 2024     Patient: Kaushal Hernandez  YOB: 1980  Date of Visit: 6/13/2024      To Whom it May Concern:    Kaushal Hernandez is under my professional care. Kaushal was seen in my office on 6/13/2024. Kaushal should be excused from work 6/12/2024-6/13/2024. Kaushal may return to work on 6/17/2024 .    If you have any questions or concerns, please don't hesitate to call.         Sincerely,          Micaela Day PA-C

## 2024-06-24 ENCOUNTER — OFFICE VISIT (OUTPATIENT)
Dept: FAMILY MEDICINE CLINIC | Facility: CLINIC | Age: 44
End: 2024-06-24
Payer: COMMERCIAL

## 2024-06-24 VITALS
HEART RATE: 84 BPM | SYSTOLIC BLOOD PRESSURE: 144 MMHG | WEIGHT: 252.2 LBS | HEIGHT: 67 IN | OXYGEN SATURATION: 98 % | BODY MASS INDEX: 39.58 KG/M2 | DIASTOLIC BLOOD PRESSURE: 88 MMHG

## 2024-06-24 DIAGNOSIS — F33.1 MODERATE EPISODE OF RECURRENT MAJOR DEPRESSIVE DISORDER (HCC): ICD-10-CM

## 2024-06-24 DIAGNOSIS — F43.10 PTSD (POST-TRAUMATIC STRESS DISORDER): Primary | ICD-10-CM

## 2024-06-24 DIAGNOSIS — F41.1 GENERALIZED ANXIETY DISORDER: ICD-10-CM

## 2024-06-24 PROCEDURE — 99213 OFFICE O/P EST LOW 20 MIN: CPT | Performed by: PHYSICIAN ASSISTANT

## 2024-06-25 NOTE — ASSESSMENT & PLAN NOTE
Continue Paxil CR.  Patient take with psychotherapist, and is scheduled to start sessions on Saturday.  He does not feel he needs medication dose adjustment at this time.  I recommended that he follow through with a psychotherapist.  I believe that a lot of patient's anxiety is situational and stems from unresolved issues from his past.

## 2024-06-25 NOTE — PROGRESS NOTES
Ambulatory Visit  Name: Kaushal Hernandez      : 1980      MRN: [id number]  Encounter Provider: Micaela Day PA-C  Encounter Date: 2024   Encounter department: [facility]    Assessment & Plan   1. PTSD (post-traumatic stress disorder)  Assessment & Plan:  Continue Paxil CR.  Patient take with psychotherapist, and is scheduled to start sessions on Saturday.  He does not feel he needs medication dose adjustment at this time.  I recommended that he follow through with a psychotherapist.  2. Moderate episode of recurrent major depressive disorder (HCC)  Assessment & Plan:  Continue Paxil CR.  Patient take with psychotherapist, and is scheduled to start sessions on Saturday.  He does not feel he needs medication dose adjustment at this time.  I recommended that he follow through with a psychotherapist.  3. Generalized anxiety disorder  Assessment & Plan:  Continue Paxil CR.  Patient take with psychotherapist, and is scheduled to start sessions on Saturday.  He does not feel he needs medication dose adjustment at this time.  I recommended that he follow through with a psychotherapist.  I believe that a lot of patient's anxiety is situational and stems from unresolved issues from his past.       History of Present Illness   {Disappearing Hyperlinks I Encounters * My Last Note * Since Last Visit * History :36590}  Kaushal is a pleasant 44-year-old male who is here today complaining of anxiety and depression.  He had his initial intake with his psychotherapist.  He is scheduled to start sessions on Saturday.  He has been having a lot of issues with his personal life, particularly his mother.  He admits that this is bringing up a lot of issues from his past, triggering his PTSD.  His psychotherapist encouraged him to come in and discuss these issues.  He does not feel he needs an adjustment on his medications.  He feels like his Paxil has been providing him significant relief.  He denies any suicidal  "homicidal thoughts.  He is very hopeful to start therapy.        Review of Systems   Constitutional:  Negative for chills, diaphoresis, fatigue and fever.   HENT:  Negative for congestion, ear pain, postnasal drip, rhinorrhea, sneezing, sore throat and trouble swallowing.    Eyes:  Negative for pain and visual disturbance.   Respiratory:  Negative for apnea, cough, shortness of breath and wheezing.    Cardiovascular:  Negative for chest pain and palpitations.   Gastrointestinal:  Negative for abdominal pain, constipation, diarrhea, nausea and vomiting.   Genitourinary:  Negative for dysuria.   Musculoskeletal:  Negative for arthralgias, gait problem and myalgias.   Neurological:  Negative for dizziness, syncope, weakness, light-headedness, numbness and headaches.   Psychiatric/Behavioral:  Positive for decreased concentration and dysphoric mood. Negative for suicidal ideas. The patient is nervous/anxious.        Objective   {Disappearing Hyperlinks   Review Vitals * Enter New Vitals * Results Review * Labs * Imaging * Cardiology * Procedures * Lung Cancer Screening :57949}  /88   Pulse 84   Ht 5' 7\" (1.702 m)   Wt 114 kg (252 lb 3.2 oz)   SpO2 98%   BMI 39.50 kg/m²     Physical Exam  Vitals and nursing note reviewed.   Constitutional:       General: He is not in acute distress.     Appearance: He is well-developed.   HENT:      Head: Normocephalic and atraumatic.      Right Ear: External ear normal.      Left Ear: External ear normal.   Eyes:      Extraocular Movements: Extraocular movements intact.   Cardiovascular:      Rate and Rhythm: Normal rate and regular rhythm.      Heart sounds: No murmur heard.  Pulmonary:      Effort: Pulmonary effort is normal. No respiratory distress.      Breath sounds: Normal breath sounds.   Musculoskeletal:         General: No swelling.      Cervical back: Neck supple.   Skin:     General: Skin is warm and dry.      Capillary Refill: Capillary refill takes less than 2 " seconds.   Neurological:      Mental Status: He is alert.   Psychiatric:         Mood and Affect: Mood is anxious and depressed. Affect is labile.         Thought Content: Thought content is not paranoid or delusional. Thought content does not include homicidal or suicidal ideation. Thought content does not include homicidal or suicidal plan.       Administrative Statements {Disappearing Hyperlinks I  Level of Service * [facility]:39605}

## 2024-06-25 NOTE — ASSESSMENT & PLAN NOTE
Continue Paxil CR.  Patient take with psychotherapist, and is scheduled to start sessions on Saturday.  He does not feel he needs medication dose adjustment at this time.  I recommended that he follow through with a psychotherapist.

## 2024-07-22 ENCOUNTER — TELEPHONE (OUTPATIENT)
Age: 44
End: 2024-07-22

## 2024-07-22 NOTE — TELEPHONE ENCOUNTER
Contacted patient off of [facility]  to verify needs of services in attempts to offer patient an appointment at [facility].  Writer SURY for patient to contact intake dept  in regards to offer appointment and left callback number [phone]; option 3.    1st call attempt

## 2024-07-31 ENCOUNTER — TELEPHONE (OUTPATIENT)
Dept: FAMILY MEDICINE CLINIC | Facility: CLINIC | Age: 44
End: 2024-07-31

## 2024-08-02 ENCOUNTER — TELEPHONE (OUTPATIENT)
Dept: PSYCHIATRY | Facility: CLINIC | Age: 44
End: 2024-08-02

## 2024-08-02 NOTE — TELEPHONE ENCOUNTER
LMOM asking patient to please call office back at [phone] to schedule new patient appointment with Dr. Lo in [place].

## 2024-08-22 DIAGNOSIS — M10.9 GOUT, UNSPECIFIED CAUSE, UNSPECIFIED CHRONICITY, UNSPECIFIED SITE: ICD-10-CM

## 2024-08-22 RX ORDER — COLCHICINE 0.6 MG/1
TABLET ORAL
Qty: 90 TABLET | Refills: 1 | Status: SHIPPED | OUTPATIENT
Start: 2024-08-22

## 2024-11-20 DIAGNOSIS — I10 ESSENTIAL HYPERTENSION: ICD-10-CM

## 2024-11-20 DIAGNOSIS — M10.9 GOUT, UNSPECIFIED CAUSE, UNSPECIFIED CHRONICITY, UNSPECIFIED SITE: ICD-10-CM

## 2024-11-20 RX ORDER — ALLOPURINOL 100 MG/1
200 TABLET ORAL DAILY
Qty: 180 TABLET | Refills: 1 | Status: SHIPPED | OUTPATIENT
Start: 2024-11-20

## 2024-11-20 RX ORDER — LISINOPRIL 20 MG/1
20 TABLET ORAL DAILY
Qty: 90 TABLET | Refills: 1 | Status: SHIPPED | OUTPATIENT
Start: 2024-11-20

## 2024-11-30 DIAGNOSIS — F43.10 PTSD (POST-TRAUMATIC STRESS DISORDER): ICD-10-CM

## 2024-11-30 DIAGNOSIS — F33.1 MODERATE EPISODE OF RECURRENT MAJOR DEPRESSIVE DISORDER (HCC): ICD-10-CM

## 2024-11-30 DIAGNOSIS — F41.1 GENERALIZED ANXIETY DISORDER: ICD-10-CM

## 2024-12-02 RX ORDER — PAROXETINE HYDROCHLORIDE HEMIHYDRATE 25 MG/1
25 TABLET, FILM COATED, EXTENDED RELEASE ORAL EVERY MORNING
Qty: 90 TABLET | Refills: 1 | Status: SHIPPED | OUTPATIENT
Start: 2024-12-02

## 2024-12-02 RX ORDER — PAROXETINE HYDROCHLORIDE HEMIHYDRATE 37.5 MG/1
37.5 TABLET, FILM COATED, EXTENDED RELEASE ORAL EVERY MORNING
Qty: 90 TABLET | Refills: 1 | Status: SHIPPED | OUTPATIENT
Start: 2024-12-02

## 2024-12-25 DIAGNOSIS — I10 ESSENTIAL HYPERTENSION: ICD-10-CM

## 2024-12-26 RX ORDER — AMLODIPINE BESYLATE 5 MG/1
5 TABLET ORAL DAILY
Qty: 30 TABLET | Refills: 0 | Status: SHIPPED | OUTPATIENT
Start: 2024-12-26

## 2025-01-18 ENCOUNTER — APPOINTMENT (OUTPATIENT)
Dept: URGENT CARE | Facility: MEDICAL CENTER | Age: 45
End: 2025-01-18

## 2025-01-27 DIAGNOSIS — I10 ESSENTIAL HYPERTENSION: ICD-10-CM

## 2025-01-28 RX ORDER — AMLODIPINE BESYLATE 5 MG/1
5 TABLET ORAL DAILY
Qty: 90 TABLET | Refills: 0 | Status: SHIPPED | OUTPATIENT
Start: 2025-01-28 | End: 2025-02-03 | Stop reason: SDUPTHER

## 2025-02-03 ENCOUNTER — OFFICE VISIT (OUTPATIENT)
Dept: FAMILY MEDICINE CLINIC | Facility: CLINIC | Age: 45
End: 2025-02-03
Payer: COMMERCIAL

## 2025-02-03 VITALS
BODY MASS INDEX: 41.62 KG/M2 | WEIGHT: 265.2 LBS | OXYGEN SATURATION: 97 % | DIASTOLIC BLOOD PRESSURE: 84 MMHG | SYSTOLIC BLOOD PRESSURE: 140 MMHG | TEMPERATURE: 98.9 F | HEART RATE: 95 BPM | HEIGHT: 67 IN

## 2025-02-03 DIAGNOSIS — F41.1 GENERALIZED ANXIETY DISORDER: ICD-10-CM

## 2025-02-03 DIAGNOSIS — F43.10 PTSD (POST-TRAUMATIC STRESS DISORDER): ICD-10-CM

## 2025-02-03 DIAGNOSIS — I10 ESSENTIAL HYPERTENSION: Primary | ICD-10-CM

## 2025-02-03 DIAGNOSIS — M10.9 GOUT, UNSPECIFIED CAUSE, UNSPECIFIED CHRONICITY, UNSPECIFIED SITE: ICD-10-CM

## 2025-02-03 DIAGNOSIS — R63.5 WEIGHT GAIN: ICD-10-CM

## 2025-02-03 DIAGNOSIS — E55.9 VITAMIN D DEFICIENCY: ICD-10-CM

## 2025-02-03 DIAGNOSIS — E78.5 HYPERLIPIDEMIA, UNSPECIFIED HYPERLIPIDEMIA TYPE: ICD-10-CM

## 2025-02-03 DIAGNOSIS — F33.1 MODERATE EPISODE OF RECURRENT MAJOR DEPRESSIVE DISORDER (HCC): ICD-10-CM

## 2025-02-03 PROCEDURE — 99214 OFFICE O/P EST MOD 30 MIN: CPT | Performed by: PHYSICIAN ASSISTANT

## 2025-02-03 RX ORDER — ALLOPURINOL 300 MG/1
300 TABLET ORAL DAILY
Qty: 90 TABLET | Refills: 1 | Status: SHIPPED | OUTPATIENT
Start: 2025-02-03

## 2025-02-03 RX ORDER — PREDNISONE 10 MG/1
TABLET ORAL
Qty: 30 TABLET | Refills: 0 | Status: SHIPPED | OUTPATIENT
Start: 2025-02-03 | End: 2025-02-15

## 2025-02-03 RX ORDER — AMLODIPINE BESYLATE 5 MG/1
5 TABLET ORAL DAILY
Qty: 90 TABLET | Refills: 3 | Status: SHIPPED | OUTPATIENT
Start: 2025-02-03

## 2025-02-03 NOTE — ASSESSMENT & PLAN NOTE
Blood pressure stable.  Continue amlodipine 5 mg daily.  Recommended low-sodium diet.  Orders:  •  amLODIPine (NORVASC) 5 mg tablet; Take 1 tablet (5 mg total) by mouth daily

## 2025-02-03 NOTE — PROGRESS NOTES
Name: Kaushal Hernandez      : 1980      MRN: [id number]  Encounter Provider: Micaela Day PA-C  Encounter Date: 2/3/2025   Encounter department: [facility]  :  Assessment & Plan  Essential hypertension  Blood pressure stable.  Continue amlodipine 5 mg daily.  Recommended low-sodium diet.  Orders:  •  amLODIPine (NORVASC) 5 mg tablet; Take 1 tablet (5 mg total) by mouth daily    PTSD (post-traumatic stress disorder)  Stable.  Continue Paxil CR.  Patient is not following with therapy at this time.  He denies any suicidal or homicidal thoughts.       Moderate episode of recurrent major depressive disorder (HCC)    Stable.  Continue Paxil CR.  Patient is not following with therapy at this time.  He denies any suicidal or homicidal thoughts.    Depression Screening Follow-up Plan: Patient's depression screening was positive with a PHQ-9 score of 16. Patient with underlying depression and was advised to continue current medications as prescribed.         Generalized anxiety disorder  Stable.  Continue Paxil CR.  Patient is not following with therapy at this time.  He denies any suicidal or homicidal thoughts.       Hyperlipidemia, unspecified hyperlipidemia type  Encouraged patient to complete labs.  Recommended low-fat diet     Vitamin D deficiency  Patient completed vitamin D supplementation.  He never completed labs.  Encouraged him to get these completed.       Gout, unspecified cause, unspecified chronicity, unspecified site  Will increase allopurinol to 300 mg daily.  Will prescribe prednisone for acute flareup.  He will notify us if symptoms do not improve or worsen  Orders:  •  allopurinol (ZYLOPRIM) 300 mg tablet; Take 1 tablet (300 mg total) by mouth daily  •  predniSONE 10 mg tablet; Take 4 tablets (40 mg total) by mouth daily for 3 days, THEN 3 tablets (30 mg total) daily for 3 days, THEN 2 tablets (20 mg total) daily for 3 days, THEN 1 tablet (10 mg total) daily for 3 days.  •  Uric acid;  "Future  •  CBC and differential; Future    Weight gain  Labs ordered to evaluate.  Educated on diet and exercise.  Orders:  •  TSH, 3rd generation with Free T4 reflex; Future           History of Present Illness   Kaushal is a very pleasant 45-year-old male who is here today for medication checkup.  He is doing very well on his amlodipine for his blood pressure.  He admits that the Paxil CR is working very well for his anxiety, depression, and PTSD.  He is no longer following with a therapist.  He does not feel he needs a therapist at this time.  He enjoys spending time outdoors.  He is also complaining of left great toe pain.  He believes that it is a flareup of his gout.  He did eat poorly this past weekend.  He admits that his symptoms started Saturday.  He has been taking his allopurinol as directed.  He has been trying colchicine, but is not helping.  He denies any recent trauma to his foot.      Review of Systems   Constitutional:  Negative for chills, diaphoresis, fatigue and fever.   HENT:  Negative for congestion, ear pain, postnasal drip, rhinorrhea, sneezing, sore throat and trouble swallowing.    Eyes:  Negative for pain and visual disturbance.   Respiratory:  Negative for apnea, cough, shortness of breath and wheezing.    Cardiovascular:  Negative for chest pain and palpitations.   Gastrointestinal:  Negative for abdominal pain, constipation, diarrhea, nausea and vomiting.   Genitourinary:  Negative for dysuria and hematuria.   Musculoskeletal:  Positive for arthralgias (Left great toe pain). Negative for gait problem and myalgias.   Neurological:  Negative for dizziness, syncope, weakness, light-headedness, numbness and headaches.   Psychiatric/Behavioral:  Positive for dysphoric mood. Negative for suicidal ideas. The patient is not nervous/anxious.        Objective   /84   Pulse 95   Temp 98.9 °F (37.2 °C)   Ht 5' 7\" (1.702 m)   Wt 120 kg (265 lb 3.2 oz)   SpO2 97%   BMI 41.54 kg/m²    "   Physical Exam  Constitutional:       Appearance: He is well-developed.   HENT:      Head: Normocephalic and atraumatic.      Right Ear: Tympanic membrane, ear canal and external ear normal.      Left Ear: Tympanic membrane, ear canal and external ear normal.      Nose: Nose normal.      Mouth/Throat:      Pharynx: No oropharyngeal exudate or posterior oropharyngeal erythema.   Eyes:      Extraocular Movements: Extraocular movements intact.   Cardiovascular:      Rate and Rhythm: Normal rate and regular rhythm.      Heart sounds: Normal heart sounds. No murmur heard.     No friction rub. No gallop.   Pulmonary:      Effort: Pulmonary effort is normal. No respiratory distress.      Breath sounds: Normal breath sounds. No wheezing or rales.   Musculoskeletal:         General: Normal range of motion.      Cervical back: Normal range of motion and neck supple.        Feet:    Skin:     General: Skin is warm and dry.   Neurological:      Mental Status: He is alert and oriented to person, place, and time.   Psychiatric:         Mood and Affect: Mood is not anxious or depressed.         Behavior: Behavior normal.         Thought Content: Thought content normal. Thought content does not include homicidal or suicidal ideation. Thought content does not include homicidal or suicidal plan.         Judgment: Judgment normal.

## 2025-02-03 NOTE — ASSESSMENT & PLAN NOTE
Stable.  Continue Paxil CR.  Patient is not following with therapy at this time.  He denies any suicidal or homicidal thoughts.

## 2025-02-03 NOTE — ASSESSMENT & PLAN NOTE
Patient completed vitamin D supplementation.  He never completed labs.  Encouraged him to get these completed.

## 2025-02-03 NOTE — ASSESSMENT & PLAN NOTE
Stable.  Continue Paxil CR.  Patient is not following with therapy at this time.  He denies any suicidal or homicidal thoughts.    Depression Screening Follow-up Plan: Patient's depression screening was positive with a PHQ-9 score of 16. Patient with underlying depression and was advised to continue current medications as prescribed.

## 2025-03-01 DIAGNOSIS — M10.9 GOUT, UNSPECIFIED CAUSE, UNSPECIFIED CHRONICITY, UNSPECIFIED SITE: ICD-10-CM

## 2025-03-03 RX ORDER — COLCHICINE 0.6 MG/1
0.6 TABLET ORAL DAILY
Qty: 90 TABLET | Refills: 1 | Status: SHIPPED | OUTPATIENT
Start: 2025-03-03

## 2025-03-06 ENCOUNTER — APPOINTMENT (OUTPATIENT)
Dept: LAB | Facility: CLINIC | Age: 45
End: 2025-03-06
Payer: COMMERCIAL

## 2025-03-06 DIAGNOSIS — R63.5 WEIGHT GAIN: ICD-10-CM

## 2025-03-06 DIAGNOSIS — M10.9 GOUT, UNSPECIFIED CAUSE, UNSPECIFIED CHRONICITY, UNSPECIFIED SITE: ICD-10-CM

## 2025-03-06 DIAGNOSIS — E78.5 HYPERLIPIDEMIA, UNSPECIFIED HYPERLIPIDEMIA TYPE: ICD-10-CM

## 2025-03-06 DIAGNOSIS — E55.9 VITAMIN D DEFICIENCY: ICD-10-CM

## 2025-03-06 LAB
25(OH)D3 SERPL-MCNC: 15.6 NG/ML (ref 30–100)
ALBUMIN SERPL BCG-MCNC: 4.4 G/DL (ref 3.5–5)
ALP SERPL-CCNC: 70 U/L (ref 34–104)
ALT SERPL W P-5'-P-CCNC: 33 U/L (ref 7–52)
ANION GAP SERPL CALCULATED.3IONS-SCNC: 7 MMOL/L (ref 4–13)
AST SERPL W P-5'-P-CCNC: 24 U/L (ref 13–39)
BASOPHILS # BLD AUTO: 0.04 THOUSANDS/ÂΜL (ref 0–0.1)
BASOPHILS NFR BLD AUTO: 1 % (ref 0–1)
BILIRUB SERPL-MCNC: 0.66 MG/DL (ref 0.2–1)
BUN SERPL-MCNC: 11 MG/DL (ref 5–25)
CALCIUM SERPL-MCNC: 9.6 MG/DL (ref 8.4–10.2)
CHLORIDE SERPL-SCNC: 103 MMOL/L (ref 96–108)
CHOLEST SERPL-MCNC: 223 MG/DL (ref ?–200)
CO2 SERPL-SCNC: 28 MMOL/L (ref 21–32)
CREAT SERPL-MCNC: 0.88 MG/DL (ref 0.6–1.3)
EOSINOPHIL # BLD AUTO: 0.08 THOUSAND/ÂΜL (ref 0–0.61)
EOSINOPHIL NFR BLD AUTO: 1 % (ref 0–6)
ERYTHROCYTE [DISTWIDTH] IN BLOOD BY AUTOMATED COUNT: 12.7 % (ref 11.6–15.1)
GFR SERPL CREATININE-BSD FRML MDRD: 104 ML/MIN/1.73SQ M
GLUCOSE P FAST SERPL-MCNC: 93 MG/DL (ref 65–99)
HCT VFR BLD AUTO: 45 % (ref 36.5–49.3)
HDLC SERPL-MCNC: 41 MG/DL
HGB BLD-MCNC: 15.5 G/DL (ref 12–17)
IMM GRANULOCYTES # BLD AUTO: 0.02 THOUSAND/UL (ref 0–0.2)
IMM GRANULOCYTES NFR BLD AUTO: 0 % (ref 0–2)
LDLC SERPL CALC-MCNC: 146 MG/DL (ref 0–100)
LYMPHOCYTES # BLD AUTO: 2.2 THOUSANDS/ÂΜL (ref 0.6–4.47)
LYMPHOCYTES NFR BLD AUTO: 28 % (ref 14–44)
MCH RBC QN AUTO: 30.4 PG (ref 26.8–34.3)
MCHC RBC AUTO-ENTMCNC: 34.4 G/DL (ref 31.4–37.4)
MCV RBC AUTO: 88 FL (ref 82–98)
MONOCYTES # BLD AUTO: 0.52 THOUSAND/ÂΜL (ref 0.17–1.22)
MONOCYTES NFR BLD AUTO: 7 % (ref 4–12)
NEUTROPHILS # BLD AUTO: 5 THOUSANDS/ÂΜL (ref 1.85–7.62)
NEUTS SEG NFR BLD AUTO: 63 % (ref 43–75)
NONHDLC SERPL-MCNC: 182 MG/DL
NRBC BLD AUTO-RTO: 0 /100 WBCS
PLATELET # BLD AUTO: 263 THOUSANDS/UL (ref 149–390)
PMV BLD AUTO: 10.4 FL (ref 8.9–12.7)
POTASSIUM SERPL-SCNC: 4.6 MMOL/L (ref 3.5–5.3)
PROT SERPL-MCNC: 7.2 G/DL (ref 6.4–8.4)
RBC # BLD AUTO: 5.1 MILLION/UL (ref 3.88–5.62)
SODIUM SERPL-SCNC: 138 MMOL/L (ref 135–147)
TRIGL SERPL-MCNC: 181 MG/DL (ref ?–150)
TSH SERPL DL<=0.05 MIU/L-ACNC: 2.12 UIU/ML (ref 0.45–4.5)
URATE SERPL-MCNC: 5.6 MG/DL (ref 3.5–8.5)
WBC # BLD AUTO: 7.86 THOUSAND/UL (ref 4.31–10.16)

## 2025-03-06 PROCEDURE — 80053 COMPREHEN METABOLIC PANEL: CPT

## 2025-03-06 PROCEDURE — 84550 ASSAY OF BLOOD/URIC ACID: CPT

## 2025-03-06 PROCEDURE — 80061 LIPID PANEL: CPT

## 2025-03-06 PROCEDURE — 84443 ASSAY THYROID STIM HORMONE: CPT

## 2025-03-06 PROCEDURE — 82306 VITAMIN D 25 HYDROXY: CPT

## 2025-03-06 PROCEDURE — 85025 COMPLETE CBC W/AUTO DIFF WBC: CPT

## 2025-03-06 PROCEDURE — 36415 COLL VENOUS BLD VENIPUNCTURE: CPT

## 2025-03-10 ENCOUNTER — RESULTS FOLLOW-UP (OUTPATIENT)
Dept: FAMILY MEDICINE CLINIC | Facility: CLINIC | Age: 45
End: 2025-03-10

## 2025-03-10 DIAGNOSIS — E78.5 HYPERLIPIDEMIA, UNSPECIFIED HYPERLIPIDEMIA TYPE: Primary | ICD-10-CM

## 2025-03-10 DIAGNOSIS — E55.9 VITAMIN D DEFICIENCY: ICD-10-CM

## 2025-03-10 RX ORDER — ERGOCALCIFEROL 1.25 MG/1
50000 CAPSULE, LIQUID FILLED ORAL WEEKLY
Qty: 12 CAPSULE | Refills: 0 | Status: SHIPPED | OUTPATIENT
Start: 2025-03-10

## 2025-03-11 DIAGNOSIS — E78.5 HYPERLIPIDEMIA, UNSPECIFIED HYPERLIPIDEMIA TYPE: Primary | ICD-10-CM

## 2025-03-11 RX ORDER — ROSUVASTATIN CALCIUM 5 MG/1
5 TABLET, COATED ORAL DAILY
Qty: 90 TABLET | Refills: 1 | Status: SHIPPED | OUTPATIENT
Start: 2025-03-11

## 2025-04-15 ENCOUNTER — NURSE TRIAGE (OUTPATIENT)
Dept: FAMILY MEDICINE CLINIC | Facility: CLINIC | Age: 45
End: 2025-04-15

## 2025-04-24 ENCOUNTER — OFFICE VISIT (OUTPATIENT)
Dept: OBGYN CLINIC | Facility: CLINIC | Age: 45
End: 2025-04-24
Payer: COMMERCIAL

## 2025-04-24 ENCOUNTER — HOSPITAL ENCOUNTER (OUTPATIENT)
Dept: RADIOLOGY | Facility: CLINIC | Age: 45
End: 2025-04-24
Attending: STUDENT IN AN ORGANIZED HEALTH CARE EDUCATION/TRAINING PROGRAM
Payer: COMMERCIAL

## 2025-04-24 VITALS — HEIGHT: 67 IN | WEIGHT: 253.8 LBS | BODY MASS INDEX: 39.83 KG/M2

## 2025-04-24 DIAGNOSIS — S83.411A SPRAIN OF MEDIAL COLLATERAL LIGAMENT OF RIGHT KNEE, INITIAL ENCOUNTER: Primary | ICD-10-CM

## 2025-04-24 DIAGNOSIS — M25.561 CHRONIC PAIN OF RIGHT KNEE: ICD-10-CM

## 2025-04-24 DIAGNOSIS — G89.29 CHRONIC PAIN OF RIGHT KNEE: ICD-10-CM

## 2025-04-24 DIAGNOSIS — M17.11 PRIMARY OSTEOARTHRITIS OF RIGHT KNEE: ICD-10-CM

## 2025-04-24 PROCEDURE — 73564 X-RAY EXAM KNEE 4 OR MORE: CPT

## 2025-04-24 PROCEDURE — 73562 X-RAY EXAM OF KNEE 3: CPT

## 2025-04-24 PROCEDURE — 99204 OFFICE O/P NEW MOD 45 MIN: CPT | Performed by: STUDENT IN AN ORGANIZED HEALTH CARE EDUCATION/TRAINING PROGRAM

## 2025-04-24 NOTE — PROGRESS NOTES
Assessment & Plan  Sprain of medial collateral ligament of right knee, initial encounter  45-year-old male with right medial knee pain after an injury 3 weeks ago consistent with a low-grade proximal MCL injury.  He does have underlying medial compartment osteoarthritis which could be exacerbating his symptoms.  He has no instability on exam.  Reviewed his imaging, exam findings and diagnosis.  At this point I recommend continued symptomatic management with anti-inflammatories, icing, activity modification.  If his symptoms persist for another 3 weeks we can consider corticosteroid injection at that time.  Orders:    XR knee 3 vw left non injury; Future    XR knee 4+ vw right injury; Future    Primary osteoarthritis of right knee             General  Chief Complaint   Patient presents with    Right Knee - Pain     Tightness, pins and needles if held in one position to long. Sleeping is worse        Subjective    Kaushal Hernandez is a 45 y.o. male who presents with RIGHT knee pain:    Chief Complaint   Patient presents with    Right Knee - Pain     Tightness, pins and needles if held in one position to long. Sleeping is worse          History of Present Illness   The patient complains of right knee pain. The pain started 3 weeks ago with injury. At that time the patient describes his leg slipping out from under him and having a buckling event.  He initially had minimal pain however had worsening pain the next day along the medial aspect of his knee.  He did attempting to brace it and activity modification for the last 3 weeks with persistent symptoms.  He locates the pain over the superior medial aspect of his knee joint.  Denies any instability.    The pain is 5/10. The pain is located Medial. The pain is described as dull and aching. They have tried NSAIDS for their problem. Pain improves with rest, ice. Pain worsens with prolonged standing.    The patient did not hear a pop. The patient does have swelling.  The  patient does not feel unstable.    [facility] Patient Answers  Failed to redirect to the Timeline version of the [facility] SmartLink.  Allergies   Allergen Reactions    Amoxil [Amoxicillin] Rash    Ciprofloxacin Rash    Penicillins Rash     Outpatient Encounter Medications as of 4/24/2025   Medication Sig Dispense Refill    allopurinol (ZYLOPRIM) 300 mg tablet Take 1 tablet (300 mg total) by mouth daily 90 tablet 1    amLODIPine (NORVASC) 5 mg tablet Take 1 tablet (5 mg total) by mouth daily 90 tablet 3    colchicine (COLCRYS) 0.6 mg tablet TAKE 1 TABLET BY MOUTH EVERY DAY 90 tablet 1    dorzolamide (TRUSOPT) 2 % ophthalmic solution 1 drop 3 (three) times a day      ergocalciferol (VITAMIN D2) 50,000 units Take 1 capsule (50,000 Units total) by mouth once a week 12 capsule 0    lisinopril (ZESTRIL) 20 mg tablet TAKE 1 TABLET BY MOUTH EVERY DAY 90 tablet 1    PARoxetine (PAXIL-CR) 25 mg 24 hr tablet TAKE 1 TABLET BY MOUTH EVERY DAY IN THE MORNING 90 tablet 1    PARoxetine (PAXIL-CR) 37.5 mg 24 hr tablet TAKE 1 TABLET BY MOUTH EVERY MORNING. 90 tablet 1    prednisoLONE acetate (PRED FORTE) 1 % ophthalmic suspension 1 drop 4 (four) times a day      rosuvastatin (CRESTOR) 5 mg tablet Take 1 tablet (5 mg total) by mouth daily 90 tablet 1     No facility-administered encounter medications on file as of 4/24/2025.     Past Medical History:   Diagnosis Date    Anxiety     Blindness of left eye     Knee pain, right      Past Surgical History:   Procedure Laterality Date    EYE SURGERY      FL INJECTION RIGHT KNEE (ARTHROGRAM)  5/21/2019    HAND SURGERY      NECK SURGERY      WRIST SURGERY       Family History   Problem Relation Age of Onset    Diabetes Mother     Hypertension Mother     Anxiety disorder Mother     Depression Mother      Social History     Tobacco Use    Smoking status: Never    Smokeless tobacco: Never   Vaping Use    Vaping status: Never Used   Substance Use Topics    Alcohol use: Never    Drug use:  Never           Objective    There were no vitals filed for this visit.  Body mass index is 39.75 kg/m².  Physical Exam  Knee Exam     right   Inspection: Without ecchymosis, wounds or prior incisions   Gait: Antalgic   Quadriceps atrophy: Mild   Tenderness: Medial Joint Line and Medial epicondyle   ROM: 0-95   Effusion: None   Meniscus Exam   right   Medial Meniscus: Joint Line Tenderness   Lateral Meniscus: None   Ligament Exam   Right   ACL Lachman: negative    Anterior Drawer:  negative   PCL Posterior Drawer:negative       MCL Stable at 0 and 30 degrees of flexion   LCL Stable at 0 and 30 degrees of flexion   PLC Deferred     Patellofemoral exam   right   Patella grind test negative   Patella instability: negative  1 Quadrants of translation   Patellar Translation Apprehension negative     Distally the patient's neurovascular status is normal.    Review of Prior Testing  I independently interpreted the following test: X-rays of the right knee taken today, including weight bearing and merchant views.  Multiple views of the knee show medial joint space narrowing more noticeable on the PA flexion view consistent with mild to moderate varus osteoarthritis.          Follow Up: No follow-ups on file.    All questions answered and patient agrees with plan.

## 2025-04-24 NOTE — LETTER
April 24, 2025     Patient: Kaushal Hernandez  YOB: 1980  Date of Visit: 4/24/2025      To Whom it May Concern:    Kaushal Hernandez is under my professional care. Kaushal was seen in my office on 4/24/2025. Kaushal may return to work on 4/28/2025 without restrictions . He was recovering from a knee injury since 4/23/2025.    If you have any questions or concerns, please don't hesitate to call.         Sincerely,          Jack Ortiz MD        CC: No Recipients

## 2025-04-29 ENCOUNTER — OFFICE VISIT (OUTPATIENT)
Dept: OBGYN CLINIC | Facility: CLINIC | Age: 45
End: 2025-04-29
Payer: COMMERCIAL

## 2025-04-29 ENCOUNTER — TELEPHONE (OUTPATIENT)
Age: 45
End: 2025-04-29

## 2025-04-29 VITALS — BODY MASS INDEX: 39.39 KG/M2 | WEIGHT: 251 LBS | HEIGHT: 67 IN

## 2025-04-29 DIAGNOSIS — M17.11 PRIMARY OSTEOARTHRITIS OF RIGHT KNEE: Primary | ICD-10-CM

## 2025-04-29 PROCEDURE — 20610 DRAIN/INJ JOINT/BURSA W/O US: CPT | Performed by: STUDENT IN AN ORGANIZED HEALTH CARE EDUCATION/TRAINING PROGRAM

## 2025-04-29 PROCEDURE — 99214 OFFICE O/P EST MOD 30 MIN: CPT | Performed by: STUDENT IN AN ORGANIZED HEALTH CARE EDUCATION/TRAINING PROGRAM

## 2025-04-29 RX ORDER — TRIAMCINOLONE ACETONIDE 40 MG/ML
40 INJECTION, SUSPENSION INTRA-ARTICULAR; INTRAMUSCULAR
Status: COMPLETED | OUTPATIENT
Start: 2025-04-29 | End: 2025-04-29

## 2025-04-29 RX ORDER — BUPIVACAINE HYDROCHLORIDE 5 MG/ML
3 INJECTION, SOLUTION EPIDURAL; INTRACAUDAL; PERINEURAL
Status: COMPLETED | OUTPATIENT
Start: 2025-04-29 | End: 2025-04-29

## 2025-04-29 RX ADMIN — TRIAMCINOLONE ACETONIDE 40 MG: 40 INJECTION, SUSPENSION INTRA-ARTICULAR; INTRAMUSCULAR at 11:15

## 2025-04-29 RX ADMIN — BUPIVACAINE HYDROCHLORIDE 3 ML: 5 INJECTION, SOLUTION EPIDURAL; INTRACAUDAL; PERINEURAL at 11:15

## 2025-04-29 NOTE — LETTER
April 29, 2025     Patient: Kaushal Hernandez  YOB: 1980  Date of Visit: 4/29/2025      To Whom it May Concern:    Kaushal Hernandez is under my professional care. Kaushal was seen in my office on 4/29/2025. Kaushal may return to work on 5/1/2025 .    If you have any questions or concerns, please don't hesitate to call.         Sincerely,          Jack Ortiz MD        CC: No Recipients

## 2025-04-29 NOTE — PROGRESS NOTES
Assessment & Plan  Primary osteoarthritis of right knee    Orders:    Ambulatory Referral to Physical Therapy; Future    Kaushal Hernandez is a 45 y.o. year old male with persistent right medial knee pain likely due to his low-grade MCL sprain overlying mild varus osteoarthritis.  He has had minimal improvement with symptomatic management including oral anti-inflammatories and activity modification.  His exam today is notable for joint line tenderness of the medial joint line.  He has no instability on exam today.  At this point we discussed the role of corticosteroid injection to improve pain and function.  After discussion of risk and benefits the patient elected undergo a right knee corticosteroid injection.  We have also provided a physical therapy prescription to improve range of motion and strengthen his quadriceps muscles.  I recommend ice and elevation for the next 48 hours and continued anti-inflammatories as needed.  He will follow-up with me as needed in the future if his symptoms fail to improve.        Subjective    History of Present Illness   Kaushal Hernandez present for follow up of right knee pain.  Previously seen in April 24, 2025 and diagnosed with a possible MCL sprain overlying mild osteoarthritis.  He been resting taking anti-inflammatory medications with minimal improvement in symptoms.  He states after working a 10-hour shift he had significant worsening of his medial knee pain.  Denies any instability, or recurrent injury.  Complains of intermittent swelling with prolonged standing that resolves with rest.      Objective    There were no vitals filed for this visit.  Body mass index is 39.31 kg/m².  Physical Exam  Knee Exam     right   Inspection: Without ecchymosis, wounds or prior incisions   Gait: Antalgic   Quadriceps atrophy: Mild   Tenderness: Medial Joint Line   ROM: 0-105   Effusion: None   Meniscus Exam   right   Medial Meniscus: Joint Line Tenderness   Lateral Meniscus: None   Ligament  Exam   Right   ACL Lachman: negative    Anterior Drawer:  negative   PCL Posterior Drawer:negative       MCL Stable at 0 and 30 degrees of flexion   LCL Stable at 0 and 30 degrees of flexion   PLC Deferred     Patellofemoral exam   right   Patella grind test negative   Patella instability: negative  1 Quadrants of translation   Patellar Translation Apprehension negative     Distally the patient's neurovascular status is normal.    Review of Prior Testing  I independently interpreted the following test(s) in PACS:     X-rays of the right knee taken 4/24/2025, including weight bearing and merchant views.  Multiple views of the knee show medial joint space narrowing more noticeable on the PA flexion view consistent with mild to moderate varus osteoarthritis.       Large joint arthrocentesis: R knee  Universal Protocol:  Risks and benefits: risks, benefits and alternatives were discussed  Consent given by: patient  Patient understanding: patient states understanding of the procedure being performed  Radiology Images displayed and confirmed. If images not available, report reviewed: imaging studies available  Supporting Documentation  Indications: pain and joint swelling     Is this a Visco injection? NoProcedure Details  Location: knee - R knee  Preparation: Patient was prepped and draped in the usual sterile fashion  Needle size: 22 G  Ultrasound guidance: no  Approach: anterolateral  Medications administered: 40 mg triamcinolone acetonide 40 mg/mL; 3 mL bupivacaine (PF) 0.5 %    Patient tolerance: patient tolerated the procedure well with no immediate complications  Dressing:  Sterile dressing applied          Follow Up: Return if symptoms worsen or fail to improve.    All questions answered and patient agrees with plan.

## 2025-04-29 NOTE — TELEPHONE ENCOUNTER
Per Dr Ortiz: That's fine, I will see him today and discuss CSI     Called both numbers listed on messages. The 0426 would not allow me to leave a voicemail. I did leave a voicemail  at the [id number] phone number. Will await call back to relay Dr Ortiz's message.

## 2025-04-29 NOTE — TELEPHONE ENCOUNTER
Caller: Patient wife Alyssa    Doctor: Dr. Ortiz    Reason for call: Patients wife called to move up Kaushal's appt to today due to the pain he has and being on his feet 10 hours a day. She wanted to make sure he is able to get a cortisone injection today because he was originally supposed to wait 3 weeks to see how it heals but he cannot wait. Please advise.    Call back#: [phone]

## 2025-04-29 NOTE — TELEPHONE ENCOUNTER
Caller: Alyssa    Doctor: Dr. Ortiz     Reason for call: Please see previous messages.  Alyssa would like to know if Kaushal will be able to get cortisone injection today .  Please call wife , as soon as possible to advise.     Call back#: [phone]

## 2025-04-29 NOTE — TELEPHONE ENCOUNTER
Caller: Patient's spouse     Doctor: Dr. Ortiz    Reason for call: wife calling back for an answer please contact to discuss     Call back#: [phone]

## 2025-05-12 ENCOUNTER — TELEPHONE (OUTPATIENT)
Dept: FAMILY MEDICINE CLINIC | Facility: CLINIC | Age: 45
End: 2025-05-12

## 2025-05-21 DIAGNOSIS — I10 ESSENTIAL HYPERTENSION: ICD-10-CM

## 2025-05-21 RX ORDER — LISINOPRIL 20 MG/1
20 TABLET ORAL DAILY
Qty: 90 TABLET | Refills: 1 | Status: SHIPPED | OUTPATIENT
Start: 2025-05-21

## 2025-06-06 DIAGNOSIS — E55.9 VITAMIN D DEFICIENCY: ICD-10-CM

## 2025-06-09 RX ORDER — ERGOCALCIFEROL 1.25 MG/1
CAPSULE, LIQUID FILLED ORAL
Qty: 12 CAPSULE | Refills: 0 | Status: SHIPPED | OUTPATIENT
Start: 2025-06-09

## 2025-06-11 ENCOUNTER — TELEPHONE (OUTPATIENT)
Age: 45
End: 2025-06-11

## 2025-06-11 ENCOUNTER — OFFICE VISIT (OUTPATIENT)
Age: 45
End: 2025-06-11
Payer: COMMERCIAL

## 2025-06-11 DIAGNOSIS — F33.1 MODERATE EPISODE OF RECURRENT MAJOR DEPRESSIVE DISORDER (HCC): ICD-10-CM

## 2025-06-11 DIAGNOSIS — M17.11 PRIMARY OSTEOARTHRITIS OF RIGHT KNEE: Primary | ICD-10-CM

## 2025-06-11 DIAGNOSIS — F43.10 PTSD (POST-TRAUMATIC STRESS DISORDER): ICD-10-CM

## 2025-06-11 DIAGNOSIS — F41.1 GENERALIZED ANXIETY DISORDER: ICD-10-CM

## 2025-06-11 PROCEDURE — 99214 OFFICE O/P EST MOD 30 MIN: CPT | Performed by: PHYSICIAN ASSISTANT

## 2025-06-11 RX ORDER — PAROXETINE HYDROCHLORIDE HEMIHYDRATE 25 MG/1
25 TABLET, FILM COATED, EXTENDED RELEASE ORAL EVERY MORNING
Qty: 90 TABLET | Refills: 0 | Status: SHIPPED | OUTPATIENT
Start: 2025-06-11

## 2025-06-11 RX ORDER — NAPROXEN 500 MG/1
500 TABLET ORAL 2 TIMES DAILY WITH MEALS
Qty: 60 TABLET | Refills: 0 | Status: SHIPPED | OUTPATIENT
Start: 2025-06-11

## 2025-06-11 NOTE — PROGRESS NOTES
Assessment:  Assessment & Plan  Primary osteoarthritis of right knee    Orders:    naproxen (NAPROSYN) 500 mg tablet; Take 1 tablet (500 mg total) by mouth 2 (two) times a day with meals    Ambulatory Referral to Physical Therapy; Future    Injection Procedure Prior Authorization; Future    Durable Medical Equipment  Right knee DJD and likely mild MCL sprain    Patient notes minimal improvement after undergoing right knee intra-articular corticosteroid injection on 4/29/2025.  Authorization process initiated for right knee Durolane injection.  Prescription for naproxen.  Patient has been performing home exercises.  New referral placed for physical therapy for 1-2 visits for additional home exercises.  Short hinged knee brace provided today for comfort.  Work note provided.  Follow-up once Durolane injection is approved.       To do next visit:  Return for Durolane.    The above stated was discussed in layman's terms and the patient expressed understanding.  All questions were answered to the patient's satisfaction.         Subjective:   Kaushal Hernandez is a 45 y.o. male who presents to the office today for evaluation of his right knee.  Patient was initially evaluated on 4/29/2025 by Dr. Chao for right knee DJD.  At that time he did receive a right knee intra-articular corticosteroid injection which only provided a few days of relief.  He notes persistent pain in the right knee localized primarily to the medial and anterior aspect.  Pain is worse with prolonged standing walking specifically down an incline.  Patient states he is very active and likes to hike and states the pain is limiting his ability to do so.  He states his pain can reach 6 out of 10 in intensity with increased activity.  He notes occasional crunching and grinding as well as some weakness and instability.  He notes some swelling and stiffness as well.  He notes increased pain when transitioning from a seated to standing position and taking his  first few steps.  He currently takes Aleve with some improvement.  He avoids Tylenol as he feels it precipitates gout attacks.  He does occasionally utilize a walking stick for support and does perform home exercises.  He denies any numbness and tingling.  No fevers or chills.      PT has failed conservative therapy? Yes   NSAIDS?  Aleve with some improvement   Tylenol?  Does not take Tylenol as it precipitates gout attacks  Home exercises/Physical Therapy?  Performs home exercises  Weight Loss/Assistive devices?  Utilizes a walking stick on occasion  PT has failed or has contraindication to a corticosteroid injection therapy? Yes  PT is symptomatic (pain that interferes with ADL's, sleep, crepitus, or knee stiffness? Yes  PT pain score? (1-10): 6/10      Review of systems negative unless otherwise specified in HPI      Past Medical History[1]    Past Surgical History[2]    Family History[3]    Social History     Occupational History    Not on file   Tobacco Use    Smoking status: Never    Smokeless tobacco: Never   Vaping Use    Vaping status: Never Used   Substance and Sexual Activity    Alcohol use: Never    Drug use: Never    Sexual activity: Not on file       Current Medications[4]    Allergies[5]       There were no vitals filed for this visit.    Objective:  Gen: No acute distress, resting comfortably in bed  HEENT: Eyes clear, moist mucus membranes, hearing intact  Respiratory: No audible wheezing or stridor  Cardiovascular: Well Perfused peripherally, 2+ distal pulse  Abdomen: nondistended, no peritoneal signs                     Right knee: No gross deformity.  Skin intact without erythema ecchymosis or swelling.  No evidence of knee joint effusion.  There is some tenderness over the medial joint line.  No lateral joint line tenderness.  Range of motion is full extension and flexion to 125 degrees with some slight crepitation without severe pain.  Stable to valgus stress with pain.  Stable to varus stress  "without pain.  Negative posterior drawer test.  Stable Lachman test.  Negative Bi's test.  Extensor mechanism is intact.    Diagnostics, reviewed and taken today if performed as documented:  I personally reviewed the pertinent films in PACS and interpretation is as follows:    X-rays right knee 4/20/2025: Mild tricompartmental degenerative changes most appreciated in the medial and patellofemoral compartments.  No acute osseous abnormality.  No fracture or dislocation.    Portions of the record may have been created with voice recognition software.  Occasional wrong word or \"sound a like\" substitutions may have occurred due to the inherent limitations of voice recognition software.  Read the chart carefully and recognize, using context, where substitutions have occurred.         [1]   Past Medical History:  Diagnosis Date    Anxiety     Blindness of left eye     Knee pain, right    [2]   Past Surgical History:  Procedure Laterality Date    EYE SURGERY      FL INJECTION RIGHT KNEE (ARTHROGRAM)  5/21/2019    HAND SURGERY      NECK SURGERY      WRIST SURGERY     [3]   Family History  Problem Relation Name Age of Onset    Diabetes Mother      Hypertension Mother      Anxiety disorder Mother      Depression Mother     [4]   Current Outpatient Medications:     naproxen (NAPROSYN) 500 mg tablet, Take 1 tablet (500 mg total) by mouth 2 (two) times a day with meals, Disp: 60 tablet, Rfl: 0    allopurinol (ZYLOPRIM) 300 mg tablet, Take 1 tablet (300 mg total) by mouth daily, Disp: 90 tablet, Rfl: 1    amLODIPine (NORVASC) 5 mg tablet, Take 1 tablet (5 mg total) by mouth daily, Disp: 90 tablet, Rfl: 3    colchicine (COLCRYS) 0.6 mg tablet, TAKE 1 TABLET BY MOUTH EVERY DAY, Disp: 90 tablet, Rfl: 1    dorzolamide (TRUSOPT) 2 % ophthalmic solution, 1 drop 3 (three) times a day, Disp: , Rfl:     ergocalciferol (VITAMIN D2) 50,000 units, TAKE 1 CAPSULE BY MOUTH ONE TIME PER WEEK, Disp: 12 capsule, Rfl: 0    lisinopril " (ZESTRIL) 20 mg tablet, TAKE 1 TABLET BY MOUTH EVERY DAY, Disp: 90 tablet, Rfl: 1    PARoxetine (PAXIL-CR) 25 mg 24 hr tablet, TAKE 1 TABLET BY MOUTH EVERY DAY IN THE MORNING, Disp: 90 tablet, Rfl: 0    PARoxetine (PAXIL-CR) 37.5 mg 24 hr tablet, TAKE 1 TABLET BY MOUTH EVERY MORNING., Disp: 90 tablet, Rfl: 1    prednisoLONE acetate (PRED FORTE) 1 % ophthalmic suspension, 1 drop 4 (four) times a day, Disp: , Rfl:     rosuvastatin (CRESTOR) 5 mg tablet, Take 1 tablet (5 mg total) by mouth daily, Disp: 90 tablet, Rfl: 1  [5]   Allergies  Allergen Reactions    Amoxil [Amoxicillin] Rash    Ciprofloxacin Rash    Penicillins Rash
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
## 2025-06-11 NOTE — ASSESSMENT & PLAN NOTE
Orders:    naproxen (NAPROSYN) 500 mg tablet; Take 1 tablet (500 mg total) by mouth 2 (two) times a day with meals    Ambulatory Referral to Physical Therapy; Future    Injection Procedure Prior Authorization; Future    Durable Medical Equipment  Right knee DJD and likely mild MCL sprain    Patient notes minimal improvement after undergoing right knee intra-articular corticosteroid injection on 4/29/2025.  Authorization process initiated for right knee Durolane injection.  Prescription for naproxen.  Patient has been performing home exercises.  New referral placed for physical therapy for 1-2 visits for additional home exercises.  Short hinged knee brace provided today for comfort.  Work note provided.  Follow-up once Durolane injection is approved.

## 2025-06-11 NOTE — LETTER
June 11, 2025     Patient: Kaushal Hernandez  YOB: 1980  Date of Visit: 6/11/2025      To Whom it May Concern:    Kaushal Hrenandez is under my professional care. Kaushal was seen in my office on 6/11/2025. Kaushal may return to work on 6/16/2025 without restrictions.  Please allow patient to utilize right knee brace while at work.    If you have any questions or concerns, please don't hesitate to call.         Sincerely,          Rohan Pantoja PA-C

## 2025-06-11 NOTE — TELEPHONE ENCOUNTER
Called and spoke w/pt and he has OA of R Knee and states had a MCL injury. Pt state he has been trying to walk and build his strength back up.  He feels that there are 2 piece in his knee that are moving over each other and hurts very bad. Today pain is 5-6/10. He is wondering about a brace to see if that would help. He just started a new job in April and has been missing days here and there due to the R knee issue. He applied for [employer] through his employer. He has some financial concerns. Dr Ortiz is not available. Pt is wanting to see someone today or tomorrow. Offered pt appt w/OIC Rohan Pantoja PA-C and pt agreeable. Scheduled for 2P at [facility] office-time, arrival location and address information reviewed w/pt.

## 2025-07-08 ENCOUNTER — OFFICE VISIT (OUTPATIENT)
Age: 45
End: 2025-07-08
Payer: COMMERCIAL

## 2025-07-08 VITALS — HEIGHT: 67 IN | WEIGHT: 251 LBS | BODY MASS INDEX: 39.39 KG/M2

## 2025-07-08 DIAGNOSIS — M17.11 PRIMARY OSTEOARTHRITIS OF RIGHT KNEE: Primary | ICD-10-CM

## 2025-07-08 PROCEDURE — 20610 DRAIN/INJ JOINT/BURSA W/O US: CPT | Performed by: PHYSICIAN ASSISTANT

## 2025-07-08 NOTE — ASSESSMENT & PLAN NOTE
Durolane injection performed today into the right knee.  Activities as tolerated with modification avoid pain.  Continue naproxen as needed.  Work note provided.  Follow-up in 3 months.

## 2025-07-08 NOTE — LETTER
July 8, 2025     Patient: Kaushal Hernandez  YOB: 1980  Date of Visit: 7/8/2025      To Whom it May Concern:    Kaushal Hernandez is under my professional care. Kaushal was seen in my office on 7/8/2025. Kaushal is out of work from 7/7/25 until 7/14/25.     If you have any questions or concerns, please don't hesitate to call.         Sincerely,          Rohan Terry Pantoja PA-C

## 2025-07-08 NOTE — PROGRESS NOTES
Assessment:  Assessment & Plan  Primary osteoarthritis of right knee       Durolane injection performed today into the right knee.  Activities as tolerated with modification avoid pain.  Continue naproxen as needed.  Work note provided.  Follow-up in 3 months.         To do next visit:  Return for 3 months with Rohan Pantoja.    The above stated was discussed in layman's terms and the patient expressed understanding.  All questions were answered to the patient's satisfaction.         Subjective:   Kaushal Hernandez is a 45 y.o. male who presents for a right knee Durolane injection.  Currently pain is 7 out of 10 in intensity.  He does take naproxen with some improvement.      Review of systems negative unless otherwise specified in HPI      Past Medical History[1]    Past Surgical History[2]    Family History[3]    Social History     Occupational History    Not on file   Tobacco Use    Smoking status: Never    Smokeless tobacco: Never   Vaping Use    Vaping status: Never Used   Substance and Sexual Activity    Alcohol use: Never    Drug use: Never    Sexual activity: Not on file       Current Medications[4]    Allergies[5]       There were no vitals filed for this visit.    Objective:  Gen: No acute distress, resting comfortably in bed  HEENT: Eyes clear, moist mucus membranes, hearing intact  Respiratory: No audible wheezing or stridor  Cardiovascular: Well Perfused peripherally, 2+ distal pulse  Abdomen: nondistended, no peritoneal signs                     Right knee: Skin intact.  No gross deformity.  No effusion.  Range of motion intact without severe pain.  Extensor mechanism is intact.    Large joint arthrocentesis: R knee    Performed by: Rohan Pantoja PA-C  Authorized by: Rohan Pantoja PA-C    Is this a Visco injection? Yes  Non-Pharmacologic Treatments Attempted: Home Exercise  Pharmacologic Treatments Attempted: Naproxen  Pain Score: 7Procedure Details  Location: knee - R knee  Needle size: 22  "G  Ultrasound guidance: no  Approach: anterolateral  Medications administered: 3 mL sodium hyaluronate 60 MG/3ML    Patient tolerance: patient tolerated the procedure well with no immediate complications  Dressing:  Sterile dressing applied            Portions of the record may have been created with voice recognition software.  Occasional wrong word or \"sound a like\" substitutions may have occurred due to the inherent limitations of voice recognition software.  Read the chart carefully and recognize, using context, where substitutions have occurred.         [1]   Past Medical History:  Diagnosis Date    Anxiety     Blindness of left eye     Knee pain, right    [2]   Past Surgical History:  Procedure Laterality Date    EYE SURGERY      FL INJECTION RIGHT KNEE (ARTHROGRAM)  5/21/2019    HAND SURGERY      NECK SURGERY      WRIST SURGERY     [3]   Family History  Problem Relation Name Age of Onset    Diabetes Mother      Hypertension Mother      Anxiety disorder Mother      Depression Mother     [4]   Current Outpatient Medications:     allopurinol (ZYLOPRIM) 300 mg tablet, Take 1 tablet (300 mg total) by mouth daily, Disp: 90 tablet, Rfl: 1    amLODIPine (NORVASC) 5 mg tablet, Take 1 tablet (5 mg total) by mouth daily, Disp: 90 tablet, Rfl: 3    colchicine (COLCRYS) 0.6 mg tablet, TAKE 1 TABLET BY MOUTH EVERY DAY, Disp: 90 tablet, Rfl: 1    dorzolamide (TRUSOPT) 2 % ophthalmic solution, 1 drop in the morning and 1 drop in the evening and 1 drop before bedtime., Disp: , Rfl:     ergocalciferol (VITAMIN D2) 50,000 units, TAKE 1 CAPSULE BY MOUTH ONE TIME PER WEEK, Disp: 12 capsule, Rfl: 0    lisinopril (ZESTRIL) 20 mg tablet, TAKE 1 TABLET BY MOUTH EVERY DAY, Disp: 90 tablet, Rfl: 1    naproxen (NAPROSYN) 500 mg tablet, Take 1 tablet (500 mg total) by mouth 2 (two) times a day with meals, Disp: 60 tablet, Rfl: 0    PARoxetine (PAXIL-CR) 25 mg 24 hr tablet, TAKE 1 TABLET BY MOUTH EVERY DAY IN THE MORNING, Disp: 90 tablet, " Rfl: 0    PARoxetine (PAXIL-CR) 37.5 mg 24 hr tablet, TAKE 1 TABLET BY MOUTH EVERY MORNING., Disp: 90 tablet, Rfl: 1    prednisoLONE acetate (PRED FORTE) 1 % ophthalmic suspension, 1 drop in the morning and 1 drop at noon and 1 drop in the evening and 1 drop before bedtime., Disp: , Rfl:     rosuvastatin (CRESTOR) 5 mg tablet, Take 1 tablet (5 mg total) by mouth daily, Disp: 90 tablet, Rfl: 1  [5]   Allergies  Allergen Reactions    Amoxil [Amoxicillin] Rash    Ciprofloxacin Rash    Penicillins Rash

## 2025-07-10 DIAGNOSIS — M17.11 PRIMARY OSTEOARTHRITIS OF RIGHT KNEE: ICD-10-CM

## 2025-07-10 DIAGNOSIS — F33.1 MODERATE EPISODE OF RECURRENT MAJOR DEPRESSIVE DISORDER (HCC): ICD-10-CM

## 2025-07-10 DIAGNOSIS — F43.10 PTSD (POST-TRAUMATIC STRESS DISORDER): ICD-10-CM

## 2025-07-10 DIAGNOSIS — F41.1 GENERALIZED ANXIETY DISORDER: ICD-10-CM

## 2025-07-10 RX ORDER — PAROXETINE HYDROCHLORIDE HEMIHYDRATE 37.5 MG/1
37.5 TABLET, FILM COATED, EXTENDED RELEASE ORAL EVERY MORNING
Qty: 90 TABLET | Refills: 1 | Status: SHIPPED | OUTPATIENT
Start: 2025-07-10

## 2025-07-10 RX ORDER — NAPROXEN 500 MG/1
500 TABLET ORAL 2 TIMES DAILY WITH MEALS
Qty: 60 TABLET | Refills: 5 | Status: SHIPPED | OUTPATIENT
Start: 2025-07-10

## 2025-07-14 ENCOUNTER — EVALUATION (OUTPATIENT)
Dept: PHYSICAL THERAPY | Facility: CLINIC | Age: 45
End: 2025-07-14
Attending: PHYSICIAN ASSISTANT
Payer: COMMERCIAL

## 2025-07-14 DIAGNOSIS — M17.11 PRIMARY OSTEOARTHRITIS OF RIGHT KNEE: Primary | ICD-10-CM

## 2025-07-14 DIAGNOSIS — M17.11 PRIMARY OSTEOARTHRITIS OF RIGHT KNEE: ICD-10-CM

## 2025-07-14 PROCEDURE — 97110 THERAPEUTIC EXERCISES: CPT | Performed by: PHYSICAL THERAPIST

## 2025-07-14 PROCEDURE — 97161 PT EVAL LOW COMPLEX 20 MIN: CPT | Performed by: PHYSICAL THERAPIST

## 2025-07-14 RX ORDER — METHYLPREDNISOLONE 4 MG/1
TABLET ORAL
Qty: 21 EACH | Refills: 0 | Status: SHIPPED | OUTPATIENT
Start: 2025-07-14

## 2025-07-14 NOTE — PROGRESS NOTES
PT Evaluation     Today's date: 2025  Patient name: Kaushal Hernandez  : 1980  MRN: [id number]  Referring provider: Rohan Pantoja P*  Dx:   Encounter Diagnosis     ICD-10-CM    1. Primary osteoarthritis of right knee  M17.11 Ambulatory Referral to Physical Therapy                     Assessment  Impairments: abnormal coordination, abnormal gait, abnormal muscle firing, abnormal muscle tone, abnormal or restricted ROM, activity intolerance, impaired physical strength, lacks appropriate home exercise program, unable to perform ADL, participation limitations, activity limitations and endurance  Symptom irritability: moderate    Assessment details: Patient presented to physical therapy with complaints of R knee pain and concerns of balance. Through evaluation limitations were found in ROM, strength, flexibility, joint mobility, and special tests consistent with medial meniscal, LCL, and MCL pathologies. Patient deficits are consistent with referring diagnosis of osteoarthritis along with medial meniscal, LCL, and MCL pathology. These deficits and reported pain level is affecting patients ability to work and perform ADLs. Patient would benefit from skilled physical therapy to address mentioned deficits and return patient to prior level of function.  Understanding of Dx/Px/POC: good     Prognosis: good    Goals  Short term:  Improve knee flexion to 120  (-) varrus/valgus stress test   Initiate and advance HEP     Long term:  Independent with HEP   Improve standing tolerance to 3hr to minimize pain at work   Improve walking tolerance to .5 mile of uneven ground to care for property     Plan  Patient would benefit from: skilled physical therapy  Planned modality interventions: neuromuscular electric stimulation, unattended electrical stimulation, TENS and ultrasound    Planned therapy interventions: abdominal trunk stabilization, balance/weight bearing training, flexibility, functional ROM exercises, gait  training, home exercise program, IADL retraining, ADL retraining, IASTM, joint mobilization, manual therapy, motor coordination training, neuromuscular re-education, patient/caregiver education, strengthening, stretching, therapeutic activities and therapeutic exercise    Frequency: 2x week  Duration in weeks: 8  Treatment plan discussed with: patient  Plan details: Patient reviewed and agrees with POC         Subjective Evaluation    History of Present Illness  Mechanism of injury: Patient reports severe GOUT starting alipurinol 5yr ago   Patient reports working as  and jumping down off on R side multiple times a day. On  patient carrying log and stepped into hole causing knee to buckle, then on  patient was standing and dogs ran into lateral aspect of knee again causing a buckle and increasing pain levels.  Patient received PRP injection on    Quality of life: good    Patient Goals  Patient goals for therapy: decreased pain, independence with ADLs/IADLs and return to work    Pain  Current pain ratin  At best pain ratin  At worst pain ratin  Quality: pressure, needle-like, tight and throbbing  Relieving factors: ice, medications and rest  Aggravating factors: walking, standing, sitting and stair climbing  Progression: no change      Diagnostic Tests  X-ray: abnormal  Treatments  Previous treatment: injection treatment  Current treatment: injection treatment and medication      Objective     Palpation     Right   Tenderness of the rectus femoris and vastus medialis.     Tenderness     Right Knee   Tenderness in the LCL (distal), LCL (proximal), MCL (distal), MCL (proximal) and medial joint line.     Active Range of Motion   Left Hip   Flexion: WFL  Extension: 5 degrees   External rotation (90/90): WFL  Internal rotation (90/90): WFL    Right Hip   Flexion: WFL  Extension: 6 degrees   External rotation (90/90): WFL  Internal rotation (90/90): WFL  Left  Knee   Flexion: 120 degrees   Extension: 1 degrees     Right Knee   Flexion: 97 degrees   Extension: -11 degrees   Left Ankle/Foot   Normal active range of motion    Right Ankle/Foot   Normal active range of motion    Additional Active Range of Motion Details  Hamstrings  L -17  R -30    Mobility   Patellar Mobility:     Right Knee   WFL: superior and inferior  Hypomobile: medial and lateral     Strength/Myotome Testing     Left Hip   Planes of Motion   Flexion: 5  Abduction: 5  External rotation: 4  Internal rotation: 4-    Right Hip   Planes of Motion   Flexion: 4  Abduction: 5  External rotation: 4  Internal rotation: 4-    Left Knee   Flexion: 4  Prone flexion: 5    Right Knee   Flexion: 2  Prone flexion: 2+    Left Ankle/Foot   Normal strength    Right Ankle/Foot   Normal strength    Tests     Right Knee   Positive lateral Bi, medial Bi, valgus stress test at 0 degrees, valgus stress test at 30 degrees and varus stress test at 0 degrees.   Negative anterior drawer, anterior Lachman, posterior drawer and varus stress test at 30 degrees.     Additional Tests Details  Bi (+) Ant medial pain     Ambulation     Observational Gait     Additional Observational Gait Details  Increased knee flexion at initial contact leading to foot flack contact on R side     Functional Assessment        Comments  TUG 8.43             Precautions: HTN, PTSD, GOUT, L eye blind     Manuals 7/14        PF Mobs         Knee PROM         MFR to  ITB         Posterior capsule stretch         Assesment                    Neuro Re-Ed         Rhomberg Stance         Sharpened Rhomberg          Tandem Stance         Weight Shifts         TKE with ball  ADD                           Ther Ex         Rec Bike  ADD       Quad Set         ITB Stretch 30” hold         Hamstring Stretch 30” hold  ADD       LLLD Knee Ext         Heel Slides          SLR         LAQ 20x        VMO SLR         Hamstring Curls  20x        Prone Quad  Stretch 30” hold         Seated quad stretch          Standing lunge stretch                   Ther Activity         Step Ups         VMO Step Ups         Ecc Step Downs         Side Steps         Monster Walks         Marching         Mini Squats         Sit to stands         HS curls          Sled push                   Gait Training

## 2025-07-16 ENCOUNTER — OFFICE VISIT (OUTPATIENT)
Dept: PHYSICAL THERAPY | Facility: CLINIC | Age: 45
End: 2025-07-16
Attending: PHYSICIAN ASSISTANT
Payer: COMMERCIAL

## 2025-07-16 DIAGNOSIS — M17.11 PRIMARY OSTEOARTHRITIS OF RIGHT KNEE: Primary | ICD-10-CM

## 2025-07-16 PROCEDURE — 97140 MANUAL THERAPY 1/> REGIONS: CPT | Performed by: PHYSICAL THERAPIST

## 2025-07-16 PROCEDURE — 97110 THERAPEUTIC EXERCISES: CPT | Performed by: PHYSICAL THERAPIST

## 2025-07-16 NOTE — PROGRESS NOTES
Daily Note     Today's date: 2025  Patient name: Kaushal Hernandez  : 1980  MRN: [id number]  Referring provider: Rohan Pantoja P*  Dx:   Encounter Diagnosis     ICD-10-CM    1. Primary osteoarthritis of right knee  M17.11                      Subjective: patient reports feeling much better after beginning HEP and       Objective: See treatment diary below      Assessment: Tolerated treatment well. Patient was educated on joint positioning, sleeping, and knee anatomy and demonstrated decreased pain and increased ROM       Plan: Continue per plan of care.      Precautions: HTN, PTSD, GOUT, L eye blind     Manuals        PF Mobs  10'       Knee PROM         MFR to  ITB         Posterior capsule stretch         Assesment                    Neuro Re-Ed         Rhomberg Stance         Sharpened Rhomberg          Tandem Stance         Weight Shifts         TKE with ball  20x ea                           Ther Ex         Rec Bike  6min L1       Quad Set         ITB Stretch 30” hold         Hamstring Stretch 30” hold  3x       LLLD Knee Ext         Heel Slides          SLR         LAQ 20x 30x       VMO SLR         Hamstring Curls  20x 30x       Prone Quad Stretch 30” hold         Seated quad stretch          Standing lunge stretch                   Ther Activity         Step Ups         VMO Step Ups         Ecc Step Downs         Side Steps         Monster Walks         Marching         Mini Squats         Sit to stands         HS curls          Sled push                   Gait Training

## 2025-07-21 ENCOUNTER — APPOINTMENT (OUTPATIENT)
Dept: PHYSICAL THERAPY | Facility: CLINIC | Age: 45
End: 2025-07-21
Attending: PHYSICIAN ASSISTANT
Payer: COMMERCIAL

## 2025-07-23 ENCOUNTER — PROCEDURE VISIT (OUTPATIENT)
Age: 45
End: 2025-07-23
Payer: COMMERCIAL

## 2025-07-23 ENCOUNTER — APPOINTMENT (OUTPATIENT)
Dept: PHYSICAL THERAPY | Facility: CLINIC | Age: 45
End: 2025-07-23
Attending: PHYSICIAN ASSISTANT
Payer: COMMERCIAL

## 2025-07-23 VITALS — BODY MASS INDEX: 39.39 KG/M2 | WEIGHT: 251 LBS | HEIGHT: 67 IN

## 2025-07-23 DIAGNOSIS — M23.91 INTERNAL DERANGEMENT OF RIGHT KNEE: Primary | ICD-10-CM

## 2025-07-23 PROCEDURE — 99213 OFFICE O/P EST LOW 20 MIN: CPT | Performed by: PHYSICIAN ASSISTANT

## 2025-07-23 RX ORDER — MELOXICAM 15 MG/1
15 TABLET ORAL DAILY
Qty: 30 TABLET | Refills: 0 | Status: SHIPPED | OUTPATIENT
Start: 2025-07-23

## 2025-07-23 NOTE — PROGRESS NOTES
Assessment:  Assessment & Plan  Internal derangement of right knee    Orders:    CT knee right arthrogram; Future    FL injection right knee (arthrogram); Future    meloxicam (MOBIC) 15 mg tablet; Take 1 tablet (15 mg total) by mouth daily  Right knee pain, possible acute medial meniscus tear.    Patient was doing well with regards to his right knee after receiving a Durolane injection into his right knee on 7/8/2025.  He noted an onset of worsening pain on 7/20/2025 after giving way incident.  He now notes significant medial sided right knee pain with associated joint line tenderness as well as mechanical signs such as catching and locking as well as a positive Bi's test.  At this time there is concern for an acute meniscus tear.  CT arthrogram will be obtained of the right knee for further evaluation as patient is unable to undergo an MRI due to having metal in his left eye.    Prescription for meloxicam.  Follow-up after CT arthrogram with one of our sports surgeons.       To do next visit:  Return for after CT arthrogram with Dr. Wilson.    The above stated was discussed in layman's terms and the patient expressed understanding.  All questions were answered to the patient's satisfaction.         Subjective:   Kaushal Hernandez is a 45 y.o. male who presents for evaluation of his right knee.  Patient underwent a right knee Durolane injection on 7/8/2025.  Patient states he was doing really well up until 7/20/2025.  He states [place] when his right knee gave out which resulted in increased pain.  He now notes severe pain in the medial aspect of the knee rated 7-9 out of 10 in intensity.  Pain is worse with bearing weight and ambulating.  Pain is worse at night as well.  He notes catching and locking as well as weakness and giving way.  He does note some swelling as well.  He was recently prescribed a Medrol Dosepak which provided minimal improvement.  He has been performing physical  "therapy which was initially providing improvement but has since exacerbated his pain.  No numbness or tingling.  No fevers or chills.      Review of systems negative unless otherwise specified in HPI      Past Medical History[1]    Past Surgical History[2]    Family History[3]    Social History     Occupational History    Not on file   Tobacco Use    Smoking status: Never    Smokeless tobacco: Never   Vaping Use    Vaping status: Never Used   Substance and Sexual Activity    Alcohol use: Never    Drug use: Never    Sexual activity: Not on file       Current Medications[4]    Allergies[5]       There were no vitals filed for this visit.    Objective:  Gen: No acute distress, resting comfortably in bed  HEENT: Eyes clear, moist mucus membranes, hearing intact  Respiratory: No audible wheezing or stridor  Cardiovascular: Well Perfused peripherally, 2+ distal pulse  Abdomen: nondistended, no peritoneal signs                     Right knee: Skin intact.  No erythema or ecchymosis.  There is mild soft tissue swelling and a small effusion.  There is significant tenderness along the medial joint line as well as some tenderness over the MCL.  No lateral joint line tenderness.  Range of motion is full extension and flexion to 130 degrees with pain at terminal flexion.  Stable to varus and valgus stress with slight discomfort with valgus stress.  Stable Lachman test.  Negative posterior drawer test.  Positive Bi's test medially.  Extensor mechanism is intact.  Sensation is intact distally.    Portions of the record may have been created with voice recognition software.  Occasional wrong word or \"sound a like\" substitutions may have occurred due to the inherent limitations of voice recognition software.  Read the chart carefully and recognize, using context, where substitutions have occurred.         [1]   Past Medical History:  Diagnosis Date    Anxiety     Blindness of left eye     Knee pain, right    [2]   Past " Surgical History:  Procedure Laterality Date    EYE SURGERY      FL INJECTION RIGHT KNEE (ARTHROGRAM)  5/21/2019    HAND SURGERY      NECK SURGERY      WRIST SURGERY     [3]   Family History  Problem Relation Name Age of Onset    Diabetes Mother      Hypertension Mother      Anxiety disorder Mother      Depression Mother     [4]   Current Outpatient Medications:     allopurinol (ZYLOPRIM) 300 mg tablet, Take 1 tablet (300 mg total) by mouth daily, Disp: 90 tablet, Rfl: 1    amLODIPine (NORVASC) 5 mg tablet, Take 1 tablet (5 mg total) by mouth daily, Disp: 90 tablet, Rfl: 3    colchicine (COLCRYS) 0.6 mg tablet, TAKE 1 TABLET BY MOUTH EVERY DAY, Disp: 90 tablet, Rfl: 1    dorzolamide (TRUSOPT) 2 % ophthalmic solution, 1 drop in the morning and 1 drop in the evening and 1 drop before bedtime., Disp: , Rfl:     ergocalciferol (VITAMIN D2) 50,000 units, TAKE 1 CAPSULE BY MOUTH ONE TIME PER WEEK, Disp: 12 capsule, Rfl: 0    lisinopril (ZESTRIL) 20 mg tablet, TAKE 1 TABLET BY MOUTH EVERY DAY, Disp: 90 tablet, Rfl: 1    meloxicam (MOBIC) 15 mg tablet, Take 1 tablet (15 mg total) by mouth daily, Disp: 30 tablet, Rfl: 0    methylPREDNISolone 4 MG tablet therapy pack, Use as directed on package, Disp: 21 each, Rfl: 0    naproxen (NAPROSYN) 500 mg tablet, TAKE 1 TABLET BY MOUTH TWICE A DAY WITH MEALS, Disp: 60 tablet, Rfl: 5    PARoxetine (PAXIL-CR) 25 mg 24 hr tablet, TAKE 1 TABLET BY MOUTH EVERY DAY IN THE MORNING, Disp: 90 tablet, Rfl: 0    PARoxetine (PAXIL-CR) 37.5 mg 24 hr tablet, TAKE 1 TABLET BY MOUTH EVERY MORNING., Disp: 90 tablet, Rfl: 1    prednisoLONE acetate (PRED FORTE) 1 % ophthalmic suspension, 1 drop in the morning and 1 drop at noon and 1 drop in the evening and 1 drop before bedtime., Disp: , Rfl:     rosuvastatin (CRESTOR) 5 mg tablet, Take 1 tablet (5 mg total) by mouth daily, Disp: 90 tablet, Rfl: 1  [5]   Allergies  Allergen Reactions    Amoxil [Amoxicillin] Rash    Ciprofloxacin Rash    Penicillins  Rash

## 2025-07-23 NOTE — LETTER
July 23, 2025     Patient: Kaushal Hernandez  YOB: 1980  Date of Visit: 7/23/2025      To Whom it May Concern:    Kaushal Hernandez is under my professional care. Kaushal was seen in my office on 7/23/2025. Kaushal is out of work until his next evaluation.    If you have any questions or concerns, please don't hesitate to call.         Sincerely,          Rohan Terry Pantoja PA-C

## 2025-07-28 ENCOUNTER — PATIENT MESSAGE (OUTPATIENT)
Age: 45
End: 2025-07-28

## 2025-07-28 ENCOUNTER — APPOINTMENT (OUTPATIENT)
Dept: PHYSICAL THERAPY | Facility: CLINIC | Age: 45
End: 2025-07-28
Attending: PHYSICIAN ASSISTANT
Payer: COMMERCIAL

## 2025-07-30 ENCOUNTER — APPOINTMENT (OUTPATIENT)
Dept: PHYSICAL THERAPY | Facility: CLINIC | Age: 45
End: 2025-07-30
Attending: PHYSICIAN ASSISTANT
Payer: COMMERCIAL

## 2025-08-02 DIAGNOSIS — M10.9 GOUT, UNSPECIFIED CAUSE, UNSPECIFIED CHRONICITY, UNSPECIFIED SITE: ICD-10-CM

## 2025-08-05 RX ORDER — ALLOPURINOL 300 MG/1
300 TABLET ORAL DAILY
Qty: 90 TABLET | Refills: 1 | Status: SHIPPED | OUTPATIENT
Start: 2025-08-05

## 2025-08-07 ENCOUNTER — HOSPITAL ENCOUNTER (OUTPATIENT)
Dept: RADIOLOGY | Facility: HOSPITAL | Age: 45
Discharge: HOME | End: 2025-08-07
Attending: PHYSICIAN ASSISTANT
Payer: COMMERCIAL

## 2025-08-07 DIAGNOSIS — M23.91 INTERNAL DERANGEMENT OF RIGHT KNEE: ICD-10-CM

## 2025-08-07 PROCEDURE — 73701 CT LOWER EXTREMITY W/DYE: CPT

## 2025-08-07 PROCEDURE — 27369 NJX CNTRST KNE ARTHG/CT/MRI: CPT

## 2025-08-07 PROCEDURE — 77002 NEEDLE LOCALIZATION BY XRAY: CPT

## 2025-08-07 RX ORDER — LIDOCAINE HYDROCHLORIDE 10 MG/ML
5 INJECTION, SOLUTION EPIDURAL; INFILTRATION; INTRACAUDAL; PERINEURAL
Status: COMPLETED | OUTPATIENT
Start: 2025-08-07 | End: 2025-08-07

## 2025-08-07 RX ORDER — SODIUM CHLORIDE 9 MG/ML
50 INJECTION INTRAVENOUS
Status: COMPLETED | OUTPATIENT
Start: 2025-08-07 | End: 2025-08-07

## 2025-08-07 RX ADMIN — IOHEXOL 20 ML: 300 INJECTION, SOLUTION INTRAVENOUS at 14:17

## 2025-08-07 RX ADMIN — SODIUM CHLORIDE 20 ML: 9 INJECTION, SOLUTION INTRAMUSCULAR; INTRAVENOUS; SUBCUTANEOUS at 14:18

## 2025-08-07 RX ADMIN — LIDOCAINE HYDROCHLORIDE 3 ML: 10 INJECTION, SOLUTION EPIDURAL; INFILTRATION; INTRACAUDAL; PERINEURAL at 14:17

## 2025-08-12 ENCOUNTER — OFFICE VISIT (OUTPATIENT)
Age: 45
End: 2025-08-12
Payer: COMMERCIAL

## 2025-08-12 PROBLEM — S83.411A SPRAIN OF MEDIAL COLLATERAL LIGAMENT OF RIGHT KNEE, INITIAL ENCOUNTER: Status: ACTIVE | Noted: 2025-08-12

## 2025-08-19 ENCOUNTER — TELEMEDICINE (OUTPATIENT)
Dept: OTHER | Facility: HOSPITAL | Age: 45
End: 2025-08-19
Payer: COMMERCIAL

## 2025-08-19 DIAGNOSIS — K04.7 DENTAL ABSCESS: Primary | ICD-10-CM

## 2025-08-19 PROCEDURE — 99213 OFFICE O/P EST LOW 20 MIN: CPT | Performed by: PHYSICIAN ASSISTANT

## 2025-08-19 RX ORDER — CLINDAMYCIN HYDROCHLORIDE 300 MG/1
300 CAPSULE ORAL 3 TIMES DAILY
Qty: 30 CAPSULE | Refills: 0 | Status: SHIPPED | OUTPATIENT
Start: 2025-08-19 | End: 2025-08-29